# Patient Record
Sex: FEMALE | Race: WHITE | Employment: OTHER | URBAN - METROPOLITAN AREA
[De-identification: names, ages, dates, MRNs, and addresses within clinical notes are randomized per-mention and may not be internally consistent; named-entity substitution may affect disease eponyms.]

---

## 2017-08-22 ENCOUNTER — OFFICE VISIT (OUTPATIENT)
Dept: INTERNAL MEDICINE CLINIC | Age: 82
End: 2017-08-22

## 2017-08-22 VITALS
RESPIRATION RATE: 20 BRPM | HEIGHT: 64 IN | WEIGHT: 148 LBS | HEART RATE: 70 BPM | SYSTOLIC BLOOD PRESSURE: 188 MMHG | OXYGEN SATURATION: 97 % | TEMPERATURE: 97 F | BODY MASS INDEX: 25.27 KG/M2 | DIASTOLIC BLOOD PRESSURE: 82 MMHG

## 2017-08-22 DIAGNOSIS — E03.9 ACQUIRED HYPOTHYROIDISM: ICD-10-CM

## 2017-08-22 DIAGNOSIS — R32 URINARY INCONTINENCE, UNSPECIFIED TYPE: ICD-10-CM

## 2017-08-22 DIAGNOSIS — K21.9 GASTROESOPHAGEAL REFLUX DISEASE WITHOUT ESOPHAGITIS: ICD-10-CM

## 2017-08-22 DIAGNOSIS — R30.0 BURNING WITH URINATION: Primary | ICD-10-CM

## 2017-08-22 DIAGNOSIS — R53.1 WEAKNESS: ICD-10-CM

## 2017-08-22 DIAGNOSIS — I10 ESSENTIAL HYPERTENSION: ICD-10-CM

## 2017-08-22 DIAGNOSIS — Z13.21 ENCOUNTER FOR VITAMIN DEFICIENCY SCREENING: ICD-10-CM

## 2017-08-22 DIAGNOSIS — L85.3 DRY SKIN: ICD-10-CM

## 2017-08-22 DIAGNOSIS — J30.9 ALLERGIC RHINITIS, UNSPECIFIED ALLERGIC RHINITIS TRIGGER, UNSPECIFIED RHINITIS SEASONALITY: ICD-10-CM

## 2017-08-22 DIAGNOSIS — R05.3 CHRONIC COUGH: ICD-10-CM

## 2017-08-22 LAB
BACTERIA UA POCT, BACTPOCT: NORMAL
BILIRUB UR QL STRIP: NEGATIVE
CASTS UA POCT: NORMAL
CLUE CELLS, CLUEPOCT: NORMAL
CRYSTALS UA POCT, CRYSPOCT: NORMAL
EPITHELIAL CELLS POCT, EPITHPOCT: NORMAL
GLUCOSE UR-MCNC: NEGATIVE MG/DL
KETONES P FAST UR STRIP-MCNC: NEGATIVE MG/DL
MUCUS UA POCT, MUCPOCT: NORMAL
PH UR STRIP: 6.5 [PH] (ref 4.6–8)
PROTEIN,URINE POC: NORMAL MG/DL
RBC UA POCT, RBCPOCT: NORMAL
SP GR UR STRIP: 1.02 (ref 1–1.03)
TRICH UA POCT, TRICHPOC: NORMAL
UA UROBILINOGEN AMB POC: NORMAL (ref 0.2–1)
URINALYSIS CLARITY POC: CLEAR
URINALYSIS COLOR POC: YELLOW
URINE BLOOD POC: NEGATIVE
URINE LEUKOCYTES POC: NORMAL
URINE NITRITES POC: NEGATIVE
WBC UA POCT, WBCPOCT: NORMAL
YEAST UA POCT, YEASTPOC: NORMAL

## 2017-08-22 RX ORDER — LEVOTHYROXINE SODIUM 88 UG/1
88 TABLET ORAL
Status: ON HOLD | COMMUNITY
End: 2021-01-01

## 2017-08-22 RX ORDER — ESOMEPRAZOLE MAGNESIUM 40 MG/1
CAPSULE, DELAYED RELEASE ORAL DAILY
COMMUNITY
End: 2021-01-01

## 2017-08-22 RX ORDER — SIMVASTATIN 40 MG/1
TABLET, FILM COATED ORAL
COMMUNITY
End: 2021-01-01

## 2017-08-22 RX ORDER — VALSARTAN 80 MG/1
TABLET ORAL DAILY
COMMUNITY
End: 2017-09-15 | Stop reason: SDUPTHER

## 2017-08-22 RX ORDER — ESTRADIOL 0.1 MG/G
2 CREAM VAGINAL DAILY
Qty: 42.5 G | Refills: 1 | Status: SHIPPED | OUTPATIENT
Start: 2017-08-22 | End: 2021-01-01

## 2017-08-22 RX ORDER — MONTELUKAST SODIUM 10 MG/1
10 TABLET ORAL DAILY
Qty: 30 TAB | Refills: 0 | Status: SHIPPED | OUTPATIENT
Start: 2017-08-22 | End: 2021-01-01

## 2017-08-22 RX ORDER — LORATADINE 10 MG/1
10 TABLET ORAL
Status: ON HOLD | COMMUNITY
End: 2021-01-01

## 2017-08-22 RX ORDER — ASPIRIN 81 MG/1
81 TABLET ORAL DAILY
COMMUNITY

## 2017-08-22 NOTE — LETTER
8/25/2017 8:58 AM 
 
Ms. Medellin Mail 2 [facility] [address] Dear Lacie Mail: 
 
Please find your most recent results below. Resulted Orders AMB POC URINALYSIS DIP STICK AUTO W/ MICRO Result Value Ref Range Color (UA POC) Yellow Clarity (UA POC) Clear Glucose (UA POC) Negative Negative Bilirubin (UA POC) Negative Negative Ketones (UA POC) Negative Negative Specific gravity (UA POC) 1.025 1.001 - 1.035 Blood (UA POC) Negative Negative pH (UA POC) 6.5 4.6 - 8.0 Protein (UA POC) neg Negative mg/dL Urobilinogen (UA POC) 0.2 mg/dL 0.2 - 1 Nitrites (UA POC) Negative Negative Leukocyte esterase (UA POC) 1+ Negative Epithelial cells (UA POC) Mucus (UA POC) WBCs (UA POC) RBCs (UA POC) Casts (UA POC)  Negative Crystals (UA POC)  Negative Clue Cells (UA POC) Trichomonas (UA POC) Yeast (UA POC) Bacteria (UA POC)  Negative CBC WITH AUTOMATED DIFF Result Value Ref Range WBC 7.9 3.4 - 10.8 x10E3/uL  
 RBC 3.90 3.77 - 5.28 x10E6/uL HGB 12.9 11.1 - 15.9 g/dL HCT 37.5 34.0 - 46.6 % MCV 96 79 - 97 fL  
 MCH 33.1 (H) 26.6 - 33.0 pg  
 MCHC 34.4 31.5 - 35.7 g/dL  
 RDW 13.8 12.3 - 15.4 % PLATELET 986 539 - 426 x10E3/uL NEUTROPHILS 43 % Lymphocytes 33 % MONOCYTES 11 % EOSINOPHILS 13 % BASOPHILS 0 %  
 ABS. NEUTROPHILS 3.4 1.4 - 7.0 x10E3/uL Abs Lymphocytes 2.6 0.7 - 3.1 x10E3/uL  
 ABS. MONOCYTES 0.9 0.1 - 0.9 x10E3/uL  
 ABS. EOSINOPHILS 1.0 (H) 0.0 - 0.4 x10E3/uL  
 ABS. BASOPHILS 0.0 0.0 - 0.2 x10E3/uL IMMATURE GRANULOCYTES 0 %  
 ABS. IMM. GRANS. 0.0 0.0 - 0.1 x10E3/uL Narrative Performed at:  [facility] [address] [place] [address]  [id number] : Batsheva Law MD, Phone:  [phone] METABOLIC PANEL, COMPREHENSIVE Result Value Ref Range Glucose 107 (H) 65 - 99 mg/dL BUN 12 8 - 27 mg/dL Creatinine 0.77 0.57 - 1.00 mg/dL  GFR est non-AA 70 >59 mL/min/1.73 GFR est AA 80 >59 mL/min/1.73  
 BUN/Creatinine ratio 16 12 - 28 Sodium 135 134 - 144 mmol/L Potassium 4.4 3.5 - 5.2 mmol/L Chloride 96 96 - 106 mmol/L  
 CO2 26 18 - 29 mmol/L Calcium 9.0 8.7 - 10.3 mg/dL Protein, total 7.1 6.0 - 8.5 g/dL Albumin 4.0 3.5 - 4.7 g/dL GLOBULIN, TOTAL 3.1 1.5 - 4.5 g/dL A-G Ratio 1.3 1.2 - 2.2 Bilirubin, total 0.4 0.0 - 1.2 mg/dL Alk. phosphatase 123 (H) 39 - 117 IU/L  
 AST (SGOT) 27 0 - 40 IU/L  
 ALT (SGPT) 18 0 - 32 IU/L Narrative Performed at:  [address] [address]  [id number] : Miriam Velasco MD, Phone:  [phone] LIPID PANEL Result Value Ref Range Cholesterol, total 174 100 - 199 mg/dL Triglyceride 173 (H) 0 - 149 mg/dL HDL Cholesterol 53 >39 mg/dL VLDL, calculated 35 5 - 40 mg/dL LDL, calculated 86 0 - 99 mg/dL Narrative Performed at:  [address] [address]  [id number] : Miriam Velasco MD, Phone:  [phone] VITAMIN D, 25 HYDROXY Result Value Ref Range VITAMIN D, 25-HYDROXY 20.3 (L) 30.0 - 100.0 ng/mL Comment:  
   Vitamin D deficiency has been defined by the [facility] practice guideline as a 
level of serum 25-OH vitamin D less than 20 ng/mL (1,2). The Endocrine Society went on to further define vitamin D 
insufficiency as a level between 21 and 29 ng/mL (2). 1. IOM ([place] of [employer]). 2010. Dietary reference 
   intakes for calcium and D. [facility]: The 
   mycirQle. 2. Julio César MF, Carole NC, Taryn TALAMANTES, et al. 
   Evaluation, treatment, and prevention of vitamin D 
   deficiency: an Endocrine Society clinical practice 
   guideline. JCEM. 2011 Jul; 96(7):1911-30. Narrative Performed at:  [address] [address]  [id number] : Miriam Velasco MD, Phone:  [phone] TSH 3RD GENERATION Result Value Ref Range TSH 1.360 0.450 - 4.500 uIU/mL Narrative Performed at:  [address]  [id number] : Miriam Velasco MD, Phone:  [phone] CVD REPORT Result Value Ref Range INTERPRETATION Note Comment:  
   Supplement report is available. Narrative Performed at:  [address] [place] [address]  [id number] : Kimball Brittle PhD, Phone:  [phone] RECOMMENDATIONS: 
As my nurse discussed over the phone: 
Triglycerides are elevated.  Encourage patient to cut sugar, increase fiber intake, limit alcohol, exercise as tolerated, and cut back on starches. Vitamin D is low.  Vitamin D 50,000 units weekly x 12 weeks.  After completion of 12 weeks, recommend OTC Vitamin D3 1000 units daily.  Med sent to pharmacy. Other labs stable Please call me if you have any questions: [phone] Sincerely, Anum Gandara NP

## 2017-08-22 NOTE — PROGRESS NOTES
Subjective:     Chief Complaint   Patient presents with    [facility]    Form Completion    Urinary Frequency       History of Present Illness    Beltran Perez is a 80y.o. year old female who presents as a new patient to [facility]. She has a hx of HTN, hypothyroid, GERD, allergic rhinitis, urinary incontinence, and cough. Today she arrives with her daughter for complaints of chronic cough. Patient believes the cough is related to PND. The patients daughter is requesting a rx for singular. The patient is already taking Claritin. She denies and CP or SOB. NAD. She is also complaining of urinary incontinence for several years. She has to wear a brief. \"Its more like a constant dribble. \"  Patient is negative for UTI in office today. She denies any other  symptoms. She is also requesting a referral to PT for increased weakness. She has a hx of bilateral knee replacement and is ambulatory with a cane. She states it is harder for her to get into a standing position from sitting recently. She also reports chronic dry skin. She denies any other complaints at this time. She needs labs. Medication review with patient. She reports compliance. No current outpatient prescriptions on file prior to visit. No current facility-administered medications on file prior to visit. Allergies no known allergies   History reviewed. No pertinent past medical history.    Past Surgical History:   Procedure Laterality Date    HX HYSTERECTOMY        Social History   Substance Use Topics    Smoking status: Never Smoker    Smokeless tobacco: Never Used    Alcohol use No      Family History   Problem Relation Age of Onset    Hypertension Mother     Cancer Father         Objective:     Vitals:    08/22/17 1411   BP: 188/82   Pulse: 70   Resp: 20   Temp: 97 °F (36.1 °C)   TempSrc: Oral   SpO2: 97%   Weight: 148 lb (67.1 kg)   Height: 5' 4\" (1.626 m)       Review of Systems   Constitutional: Negative. HENT: Negative. Eyes: Negative. Respiratory: Positive for cough. Cardiovascular: Negative. Gastrointestinal: Negative. Genitourinary:        Incontinence   Musculoskeletal: Negative. Skin: Positive for itching. Neurological:        Weakness   Psychiatric/Behavioral: Negative. Physical Exam   Constitutional: She is oriented to person, place, and time. She appears well-developed and well-nourished. No distress. Pleasant elderly  women. HENT:   Head: Normocephalic and atraumatic. Eyes: EOM are normal. Pupils are equal, round, and reactive to light. Neck: Normal range of motion. Neck supple. Cardiovascular: Normal rate, regular rhythm and normal heart sounds. Pulmonary/Chest: Effort normal and breath sounds normal. No respiratory distress. Abdominal: Soft. Bowel sounds are normal. She exhibits no distension. There is no tenderness. Musculoskeletal: Normal range of motion. She exhibits no edema, tenderness or deformity. Pt is ambulatory with a cane. Neurological: She is alert and oriented to person, place, and time. Skin: Skin is warm and dry. She is not diaphoretic. Psychiatric: She has a normal mood and affect. Her behavior is normal.   Nursing note and vitals reviewed. Assessment/ Plan:   Diagnoses and all orders for this visit:    1. Burning with urination   Will order  -     AMB POC URINALYSIS DIP STICK AUTO W/O MICRO  -     AMB POC URINALYSIS DIP STICK AUTO W/ MICRO     2. Essential hypertension   Will order  -     CBC WITH AUTOMATED DIFF  -     METABOLIC PANEL, COMPREHENSIVE  -     LIPID PANEL    3. Gastroesophageal reflux disease without esophagitis   Will order  -     CBC WITH AUTOMATED DIFF  -     METABOLIC PANEL, COMPREHENSIVE    4.  Urinary incontinence, unspecified type   Will order  -     CBC WITH AUTOMATED DIFF  -     METABOLIC PANEL, COMPREHENSIVE  -     estradiol (ESTRACE) 0.01 % (0.1 mg/gram) vaginal cream; Insert 2 g into vagina daily. Educated on doing Kegel exercises. Do the exercises 3 times a day, on 3 or 4 days a week. Each time, flex your muscles 8 to 12 times, and hold them tight for 6 to 8 seconds each time you tighten. Keep up this routine for at least 3 to 4 months. 5. Acquired hypothyroidism   Will order  -     TSH 3RD GENERATION    6. Encounter for vitamin deficiency screening   Will order  -     VITAMIN D, 25 HYDROXY    7. Weakness   Will order  -     REFERRAL TO PHYSICAL THERAPY    8. Allergic rhinitis, unspecified allergic rhinitis trigger, unspecified rhinitis seasonality    Will order   -     montelukast (SINGULAIR) 10 mg tablet; Take 1 Tab by mouth daily. 9. Chronic cough   Will order  -     montelukast (SINGULAIR) 10 mg tablet; Take 1 Tab by mouth daily. 10. Dry skin   Will order   -     cetaphil (CETAPHIL) lotion; Apply  to affected area as needed for Dry Skin. Patient's plan of care has been reviewed with them. Patient and/or family have verbally conveyed their understanding and agreement of the patient's signs, symptoms, diagnosis, treatment and prognosis and additionally agree to follow up as recommended or return to [facility] should their condition change prior to follow-up. Discharge instructions have also been provided to the patient with some educational information regarding their diagnosis as well a list of reasons why they would want to return to the office prior to their follow-up appointment should their condition change.

## 2017-08-22 NOTE — PROGRESS NOTES
Chief Complaint   Patient presents with   [facility] Establish Care    Form Completion    Urinary Frequency

## 2017-08-22 NOTE — MR AVS SNAPSHOT
Visit Information Date & Time Provider Department Dept. Phone Encounter #  
 8/22/2017  2:00 PM Negrita Munoz NP [facility] [phone] [id number] Upcoming Health Maintenance Date Due DTaP/Tdap/Td series (1 - Tdap) 8/3/1951 ZOSTER VACCINE AGE 60> 6/3/1990 GLAUCOMA SCREENING Q2Y 8/3/1995 OSTEOPOROSIS SCREENING (DEXA) 8/3/1995 Pneumococcal 65+ Low/Medium Risk (1 of 2 - PCV13) 8/3/1995 MEDICARE YEARLY EXAM 8/3/1995 INFLUENZA AGE 9 TO ADULT 8/1/2017 Allergies as of 8/22/2017  Never Reviewed Not on File Current Immunizations  Never Reviewed No immunizations on file. Not reviewed this visit You Were Diagnosed With   
  
 Codes Comments Burning with urination    -  Primary ICD-10-CM: R30.0 ICD-9-CM: 788.1 Essential hypertension     ICD-10-CM: I10 
ICD-9-CM: 401.9 Gastroesophageal reflux disease without esophagitis     ICD-10-CM: K21.9 ICD-9-CM: 530.81 Urinary incontinence, unspecified type     ICD-10-CM: R32 
ICD-9-CM: 788.30 Acquired hypothyroidism     ICD-10-CM: E03.9 ICD-9-CM: 244.9 Encounter for vitamin deficiency screening     ICD-10-CM: Z13.21 ICD-9-CM: V77.99 Weakness     ICD-10-CM: R53.1 ICD-9-CM: 780.79 Allergic rhinitis, unspecified allergic rhinitis trigger, unspecified rhinitis seasonality     ICD-10-CM: J30.9 ICD-9-CM: 477.9 Chronic cough     ICD-10-CM: R05 ICD-9-CM: 786.2 Dry skin     ICD-10-CM: L85.3 ICD-9-CM: 701.1 Vitals BP Pulse Temp Resp Height(growth percentile) Weight(growth percentile) 188/82 (BP 1 Location: Right arm, BP Patient Position: Sitting) 70 97 °F (36.1 °C) (Oral) 20 5' 4\" (1.626 m) 148 lb (67.1 kg) SpO2 BMI 97% 25.4 kg/m2 Vitals History BMI and BSA Data Body Mass Index Body Surface Area  
 25.4 kg/m 2 1.74 m 2 Preferred Pharmacy Pharmacy Name Phone  [facility], [address] [place] [address] [phone] Your Updated Medication List  
  
   
This list is accurate as of: 8/22/17  3:07 PM.  Always use your most recent med list.  
  
  
  
  
 aspirin delayed-release 81 mg tablet Take  by mouth daily. cetaphil lotion Commonly known as:  CETAPHIL Apply  to affected area as needed for Dry Skin. conjugated estrogens 0.625 mg/gram vaginal cream  
Commonly known as:  PREMARIN Insert 0.5 g into vagina daily. levothyroxine 88 mcg tablet Commonly known as:  SYNTHROID Take  by mouth Daily (before breakfast). loratadine 10 mg tablet Commonly known as:  Emmy Buddle Take 10 mg by mouth.  
  
 montelukast 10 mg tablet Commonly known as:  SINGULAIR Take 1 Tab by mouth daily. NexIUM 40 mg capsule Generic drug:  esomeprazole Take  by mouth daily. simvastatin 40 mg tablet Commonly known as:  ZOCOR Take  by mouth nightly. valsartan 80 mg tablet Commonly known as:  DIOVAN Take  by mouth daily. Prescriptions Sent to Pharmacy Refills  
 montelukast (SINGULAIR) 10 mg tablet 0 Sig: Take 1 Tab by mouth daily. Class: Normal  
 Pharmacy: [address] [place], [place] [address], [place] [address] Ph #: [phone] Route: Oral  
 cetaphil (CETAPHIL) lotion 0 Sig: Apply  to affected area as needed for Dry Skin. Class: Normal  
 Pharmacy: [address] [place], [place] [address], [place] [address] Ph #: [phone] Route: Topical  
 conjugated estrogens (PREMARIN) 0.625 mg/gram vaginal cream 1 Sig: Insert 0.5 g into vagina daily. Class: Normal  
 Pharmacy: [address] [place], [place] [address], [place] [address] Ph #: [phone] Route: Vaginal  
  
We Performed the Following AMB POC URINALYSIS DIP STICK AUTO W/ MICRO  [36206 CPT(R)] AMB POC URINALYSIS DIP STICK AUTO W/O MICRO [46013 CPT(R)] CBC WITH AUTOMATED DIFF [81932 CPT(R)] LIPID PANEL [90246 CPT(R)] METABOLIC PANEL, COMPREHENSIVE [97517 CPT(R)]  REFERRAL TO PHYSICAL THERAPY [BJS13 Custom] Comments:  
 Strength training TSH 3RD GENERATION [44790 CPT(R)] VITAMIN D, 25 HYDROXY O501016 CPT(R)] Referral Information Referral ID Referred By Referred To  
  
 [id number] KONSTANTINNTRony COLBERTelieseralonso 57 [address] [facility] [place], [address] [place] [address] Road Phone: [phone] Fax: [phone] Visits Status Start Date End Date 1 New Request 8/22/17 8/22/18 If your referral has a status of pending review or denied, additional information will be sent to support the outcome of this decision. Introducing [facility]! Elizabeth Shepherd introduces Zookal patient portal. Now you can access parts of your medical record, email your doctor's office, and request medication refills online. 1. In your internet browser, go to [email or web address] 2. Click on the First Time User? Click Here link in the Sign In box. You will see the New Member Sign Up page. 3. Enter your Zookal Access Code exactly as it appears below. You will not need to use this code after youve completed the sign-up process. If you do not sign up before the expiration date, you must request a new code. · Zookal Access Code: [email or web address] Expires: 11/20/2017  1:49 PM 
 
4. Enter the last four digits of your Social Security Number (xxxx) and Date of Birth (mm/dd/yyyy) as indicated and click Submit. You will be taken to the next sign-up page. 5. Create a [employer] ID. This will be your Zookal login ID and cannot be changed, so think of one that is secure and easy to remember. 6. Create a Zookal password. You can change your password at any time. 7. Enter your Password Reset Question and Answer. This can be used at a later time if you forget your password. 8. Enter your e-mail address. You will receive e-mail notification when new information is available in [address]  
9. Click Sign Up. You can now view and download portions of your medical record. 10. Click the Download Summary menu link to download a portable copy of your medical information. If you have questions, please visit the Frequently Asked Questions section of the Webcrumb[employer] website. Remember, Webcrumbz is NOT to be used for urgent needs. For medical emergencies, dial 911. Now available from your iPhone and Android! Please provide this summary of care documentation to your next provider. Your primary care clinician is listed as Genesis Sanchez. If you have any questions after today's visit, please call [phone].

## 2017-08-23 ENCOUNTER — HOSPITAL ENCOUNTER (OUTPATIENT)
Dept: LAB | Age: 82
Discharge: HOME | End: 2017-08-23
Payer: MEDICARE

## 2017-08-23 PROCEDURE — 80061 LIPID PANEL: CPT

## 2017-08-23 PROCEDURE — 82306 VITAMIN D 25 HYDROXY: CPT

## 2017-08-23 PROCEDURE — 84443 ASSAY THYROID STIM HORMONE: CPT

## 2017-08-23 PROCEDURE — 85025 COMPLETE CBC W/AUTO DIFF WBC: CPT

## 2017-08-23 PROCEDURE — 36415 COLL VENOUS BLD VENIPUNCTURE: CPT

## 2017-08-23 PROCEDURE — 80053 COMPREHEN METABOLIC PANEL: CPT

## 2017-08-24 LAB
25(OH)D3+25(OH)D2 SERPL-MCNC: 20.3 NG/ML (ref 30–100)
ALBUMIN SERPL-MCNC: 4 G/DL (ref 3.5–4.7)
ALBUMIN/GLOB SERPL: 1.3 {RATIO} (ref 1.2–2.2)
ALP SERPL-CCNC: 123 IU/L (ref 39–117)
ALT SERPL-CCNC: 18 IU/L (ref 0–32)
AST SERPL-CCNC: 27 IU/L (ref 0–40)
BASOPHILS # BLD AUTO: 0 X10E3/UL (ref 0–0.2)
BASOPHILS NFR BLD AUTO: 0 %
BILIRUB SERPL-MCNC: 0.4 MG/DL (ref 0–1.2)
BUN SERPL-MCNC: 12 MG/DL (ref 8–27)
BUN/CREAT SERPL: 16 (ref 12–28)
CALCIUM SERPL-MCNC: 9 MG/DL (ref 8.7–10.3)
CHLORIDE SERPL-SCNC: 96 MMOL/L (ref 96–106)
CHOLEST SERPL-MCNC: 174 MG/DL (ref 100–199)
CO2 SERPL-SCNC: 26 MMOL/L (ref 18–29)
CREAT SERPL-MCNC: 0.77 MG/DL (ref 0.57–1)
EOSINOPHIL # BLD AUTO: 1 X10E3/UL (ref 0–0.4)
EOSINOPHIL NFR BLD AUTO: 13 %
ERYTHROCYTE [DISTWIDTH] IN BLOOD BY AUTOMATED COUNT: 13.8 % (ref 12.3–15.4)
GLOBULIN SER CALC-MCNC: 3.1 G/DL (ref 1.5–4.5)
GLUCOSE SERPL-MCNC: 107 MG/DL (ref 65–99)
HCT VFR BLD AUTO: 37.5 % (ref 34–46.6)
HDLC SERPL-MCNC: 53 MG/DL
HGB BLD-MCNC: 12.9 G/DL (ref 11.1–15.9)
IMM GRANULOCYTES # BLD: 0 X10E3/UL (ref 0–0.1)
IMM GRANULOCYTES NFR BLD: 0 %
INTERPRETATION, 910389: NORMAL
LDLC SERPL CALC-MCNC: 86 MG/DL (ref 0–99)
LYMPHOCYTES # BLD AUTO: 2.6 X10E3/UL (ref 0.7–3.1)
LYMPHOCYTES NFR BLD AUTO: 33 %
MCH RBC QN AUTO: 33.1 PG (ref 26.6–33)
MCHC RBC AUTO-ENTMCNC: 34.4 G/DL (ref 31.5–35.7)
MCV RBC AUTO: 96 FL (ref 79–97)
MONOCYTES # BLD AUTO: 0.9 X10E3/UL (ref 0.1–0.9)
MONOCYTES NFR BLD AUTO: 11 %
NEUTROPHILS # BLD AUTO: 3.4 X10E3/UL (ref 1.4–7)
NEUTROPHILS NFR BLD AUTO: 43 %
PLATELET # BLD AUTO: 244 X10E3/UL (ref 150–379)
POTASSIUM SERPL-SCNC: 4.4 MMOL/L (ref 3.5–5.2)
PROT SERPL-MCNC: 7.1 G/DL (ref 6–8.5)
RBC # BLD AUTO: 3.9 X10E6/UL (ref 3.77–5.28)
SODIUM SERPL-SCNC: 135 MMOL/L (ref 134–144)
TRIGL SERPL-MCNC: 173 MG/DL (ref 0–149)
TSH SERPL DL<=0.005 MIU/L-ACNC: 1.36 UIU/ML (ref 0.45–4.5)
VLDLC SERPL CALC-MCNC: 35 MG/DL (ref 5–40)
WBC # BLD AUTO: 7.9 X10E3/UL (ref 3.4–10.8)

## 2017-08-24 RX ORDER — ERGOCALCIFEROL 1.25 MG/1
50000 CAPSULE ORAL
Qty: 12 CAP | Refills: 0 | Status: SHIPPED | OUTPATIENT
Start: 2017-08-24 | End: 2021-01-01

## 2017-08-24 NOTE — PROGRESS NOTES
Triglycerides are elevated. Encourage patient to cut sugar, increase fiber intake, limit alcohol, exercise as tolerated, and cut back on starches. Vitamin D is low. Vitamin D 50,000 units weekly x 12 weeks. After completion of 12 weeks, recommend OTC Vitamin D3 1000 units daily. Med sent to pharmacy. Other labs stable.

## 2017-09-15 ENCOUNTER — OFFICE VISIT (OUTPATIENT)
Dept: INTERNAL MEDICINE CLINIC | Age: 82
End: 2017-09-15

## 2017-09-15 ENCOUNTER — HOSPITAL ENCOUNTER (OUTPATIENT)
Dept: LAB | Age: 82
Discharge: HOME | End: 2017-09-15
Payer: MEDICARE

## 2017-09-15 VITALS
SYSTOLIC BLOOD PRESSURE: 173 MMHG | TEMPERATURE: 98 F | BODY MASS INDEX: 25.61 KG/M2 | HEIGHT: 64 IN | WEIGHT: 150 LBS | OXYGEN SATURATION: 97 % | HEART RATE: 78 BPM | DIASTOLIC BLOOD PRESSURE: 90 MMHG | RESPIRATION RATE: 20 BRPM

## 2017-09-15 DIAGNOSIS — R05.3 CHRONIC COUGH: Primary | ICD-10-CM

## 2017-09-15 DIAGNOSIS — K21.9 GASTROESOPHAGEAL REFLUX DISEASE WITHOUT ESOPHAGITIS: ICD-10-CM

## 2017-09-15 DIAGNOSIS — I10 ESSENTIAL HYPERTENSION: ICD-10-CM

## 2017-09-15 PROCEDURE — 82785 ASSAY OF IGE: CPT

## 2017-09-15 PROCEDURE — 36415 COLL VENOUS BLD VENIPUNCTURE: CPT

## 2017-09-15 RX ORDER — PROMETHAZINE HYDROCHLORIDE AND DEXTROMETHORPHAN HYDROBROMIDE 6.25; 15 MG/5ML; MG/5ML
5 SYRUP ORAL
Qty: 118 ML | Refills: 0 | Status: SHIPPED | OUTPATIENT
Start: 2017-09-15 | End: 2017-09-22

## 2017-09-15 RX ORDER — VALSARTAN 160 MG/1
160 TABLET ORAL DAILY
Qty: 30 TAB | Refills: 1 | Status: ON HOLD | OUTPATIENT
Start: 2017-09-15 | End: 2021-01-01

## 2017-09-15 NOTE — PATIENT INSTRUCTIONS
Cough: Care Instructions  Your Care Instructions  A cough is your body's response to something that bothers your throat or airways. Many things can cause a cough. You might cough because of a cold or the flu, bronchitis, or asthma. Smoking, postnasal drip, allergies, and stomach acid that backs up into your throat also can cause coughs. A cough is a symptom, not a disease. Most coughs stop when the cause, such as a cold, goes away. You can take a few steps at home to cough less and feel better. Follow-up care is a key part of your treatment and safety. Be sure to make and go to all appointments, and call your doctor if you are having problems. It's also a good idea to know your test results and keep a list of the medicines you take. How can you care for yourself at home? · Drink lots of water and other fluids. This helps thin the mucus and soothes a dry or sore throat. Honey or lemon juice in hot water or tea may ease a dry cough. · Take cough medicine as directed by your doctor. · Prop up your head on pillows to help you breathe and ease a dry cough. · Try cough drops to soothe a dry or sore throat. Cough drops don't stop a cough. Medicine-flavored cough drops are no better than candy-flavored drops or hard candy. · Do not smoke. Avoid secondhand smoke. If you need help quitting, talk to your doctor about stop-smoking programs and medicines. These can increase your chances of quitting for good. When should you call for help? Call 911 anytime you think you may need emergency care. For example, call if:  · You have severe trouble breathing. Call your doctor now or seek immediate medical care if:  · You cough up blood. · You have new or worse trouble breathing. · You have a new or higher fever. · You have a new rash.   Watch closely for changes in your health, and be sure to contact your doctor if:  · You cough more deeply or more often, especially if you notice more mucus or a change in the color of your mucus. · You have new symptoms, such as a sore throat, an earache, or sinus pain. · You do not get better as expected. Where can you learn more? Go to http://yvonne-jocelynn.info/. Enter D279 in the search box to learn more about \"Cough: Care Instructions. \"  Current as of: March 25, 2017  Content Version: 11.3  © 6789-9269 [employer]. Care instructions adapted under license by [employer] (which disclaims liability or warranty for this information). If you have questions about a medical condition or this instruction, always ask your healthcare professional. [place] [address] any warranty or liability for your use of this information. Chronic Cough: Care Instructions  Your Care Instructions    A cough is your body's response to something that bothers your throat or airways. Many things can cause a cough. You might cough because of a cold or the flu, bronchitis, or asthma. Smoking, postnasal drip, allergies, and stomach acid that backs up into your throat also can cause a cough. A cough can be short-term (acute) or long-term (chronic). A chronic cough lasts more than 3 weeks. A chronic cough is often caused by a long-term problem, such as asthma. Another cause might be a medicine, such as an ACE inhibitor. A cough is a symptom, not a disease. To treat a chronic cough, you may need to treat the problem that causes it. You can take a few steps at home to cough less and feel better. Some people cough or clear their throat out of habit for no clear reason. Follow-up care is a key part of your treatment and safety. Be sure to make and go to all appointments, and call your doctor if you are having problems. It's also a good idea to know your test results and keep a list of the medicines you take. How can you care for yourself at home? · Drink plenty of water and other fluids. This may help soothe a dry or sore throat.  Honey or lemon juice in hot water or tea may ease a dry cough. · Prop up your head on pillows to help you breathe and ease a cough. · Do not smoke or allow others to smoke around you. Smoke can make a cough worse. If you need help quitting, talk to your doctor about stop-smoking programs and medicines. These can increase your chances of quitting for good. · Avoid exposure to smoke, dust, or other pollutants, or wear a face mask. Check with your doctor or pharmacist to find out which type of face mask will give you the most benefit. · Take cough medicine as directed by your doctor. · Try cough drops to soothe a dry or sore throat. Cough drops don't stop a cough. Medicine-flavored cough drops are no better than candy-flavored drops or hard candy. Throat clearing  When you have a chronic cough or a disease that may cause this type of cough, you may often feel like you want to clear your throat. This helps bring up mucus. But throat clearing does not always have a cause. Throat clearing can become a habit. The more you do it, the more you feel like you need to do it. But frequent throat clearing can be hard on your vocal cords. It's like slamming them together. To help lessen throat clearing, you can try:  · Taking small sips of water. · Not clearing your throat when you feel you need to. · Swallowing hard when you want to clear your throat. You may want to ask your doctor if a medicine that thins mucus would help. When should you call for help? Call 911 anytime you think you may need emergency care. For example, call if:  · You have severe trouble breathing. Call your doctor now or seek immediate medical care if:  · You cough up blood. · You have new or worse trouble breathing. · You have a new or higher fever. Watch closely for changes in your health, and be sure to contact your doctor if:  · You cough more deeply or more often, especially if you notice more mucus or a change in the color of your mucus.   · You do not get better as expected. Where can you learn more? Go to http://yvonne-jocelynn.info/. Enter A257 in the search box to learn more about \"Chronic Cough: Care Instructions. \"  Current as of: March 25, 2017  Content Version: 11.3  © 0291-6038 [employer]. Care instructions adapted under license by [employer] (which disclaims liability or warranty for this information). If you have questions about a medical condition or this instruction, always ask your healthcare professional. [address] any warranty or liability for your use of this information. Gastroesophageal Reflux Disease (GERD): Care Instructions  Your Care Instructions    Gastroesophageal reflux disease (GERD) is the backward flow of stomach acid into the esophagus. The esophagus is the tube that leads from your throat to your stomach. A one-way valve prevents the stomach acid from moving up into this tube. When you have GERD, this valve does not close tightly enough. If you have mild GERD symptoms including heartburn, you may be able to control the problem with antacids or over-the-counter medicine. Changing your diet, losing weight, and making other lifestyle changes can also help reduce symptoms. Follow-up care is a key part of your treatment and safety. Be sure to make and go to all appointments, and call your doctor if you are having problems. Its also a good idea to know your test results and keep a list of the medicines you take. How can you care for yourself at home? · Take your medicines exactly as prescribed. Call your doctor if you think you are having a problem with your medicine. · Your doctor may recommend over-the-counter medicine. For mild or occasional indigestion, antacids, such as Tums, Gaviscon, Mylanta, or Maalox, may help. Your doctor also may recommend over-the-counter acid reducers, such as Pepcid AC, Tagamet HB, Zantac 75, or Prilosec.  Read and follow all instructions on the label. If you use these medicines often, talk with your doctor. · Change your eating habits. ¨ Its best to eat several small meals instead of two or three large meals. ¨ After you eat, wait 2 to 3 hours before you lie down. ¨ Chocolate, mint, and alcohol can make GERD worse. ¨ Spicy foods, foods that have a lot of acid (like tomatoes and oranges), and coffee can make GERD symptoms worse in some people. If your symptoms are worse after you eat a certain food, you may want to stop eating that food to see if your symptoms get better. · Do not smoke or chew tobacco. Smoking can make GERD worse. If you need help quitting, talk to your doctor about stop-smoking programs and medicines. These can increase your chances of quitting for good. · If you have GERD symptoms at night, raise the head of your bed 6 to 8 inches by putting the frame on blocks or placing a foam wedge under the head of your mattress. (Adding extra pillows does not work.)  · Do not wear tight clothing around your middle. · Lose weight if you need to. Losing just 5 to 10 pounds can help. When should you call for help? Call your doctor now or seek immediate medical care if:  · You have new or different belly pain. · Your stools are black and tarlike or have streaks of blood. Watch closely for changes in your health, and be sure to contact your doctor if:  · Your symptoms have not improved after 2 days. · Food seems to catch in your throat or chest.  Where can you learn more? Go to http://yvonne-jocelynn.info/. Enter Q739 in the search box to learn more about \"Gastroesophageal Reflux Disease (GERD): Care Instructions. \"  Current as of: August 9, 2016  Content Version: 11.3  © 4040-0992 [employer]. Care instructions adapted under license by [employer] (which disclaims liability or warranty for this information).  If you have questions about a medical condition or this instruction, always ask your healthcare professional. [address] any warranty or liability for your use of this information.

## 2017-09-15 NOTE — MR AVS SNAPSHOT
Visit Information Date & Time Provider Department Dept. Phone Encounter #  
 9/15/2017  2:00 PM Sonny eWbb NP [facility] [phone] [id number] Follow-up Instructions Return in about 3 months (around 12/15/2017). Your Appointments 9/25/2017 10:00 AM  
ROUTINE CARE with Sonny Webb NP [facility] ([facility] Appt Note: follow up [address] [place] [address] [address] [facility] [address]  
[phone]  
  
   
 [address] Upcoming Health Maintenance Date Due DTaP/Tdap/Td series (1 - Tdap) 8/3/1951 ZOSTER VACCINE AGE 60> 6/3/1990 GLAUCOMA SCREENING Q2Y 8/3/1995 OSTEOPOROSIS SCREENING (DEXA) 8/3/1995 Pneumococcal 65+ Low/Medium Risk (1 of 2 - PCV13) 8/3/1995 MEDICARE YEARLY EXAM 8/3/1995 INFLUENZA AGE 9 TO ADULT 8/1/2017 Allergies as of 9/15/2017  Review Complete On: 9/15/2017 By: Charlotte Franks LPN No Known Allergies Current Immunizations  Never Reviewed No immunizations on file. Not reviewed this visit You Were Diagnosed With   
  
 Codes Comments Chronic cough    -  Primary ICD-10-CM: T09 ICD-9-CM: 786.2 Essential hypertension     ICD-10-CM: I10 
ICD-9-CM: 401.9 Vitals BP Pulse Temp Resp Height(growth percentile) Weight(growth percentile) (!) 185/93 (BP 1 Location: Left arm, BP Patient Position: Sitting) 78 98 °F (36.7 °C) (Oral) 20 5' 4\" (1.626 m) 150 lb (68 kg) SpO2 BMI OB Status Smoking Status 97% 25.75 kg/m2 Menopause Never Smoker Vitals History BMI and BSA Data Body Mass Index Body Surface Area 25.75 kg/m 2 1.75 m 2 Preferred Pharmacy Pharmacy Name Phone [facility], [address] [place] [address] [phone] Your Updated Medication List  
  
   
This list is accurate as of: 9/15/17  2:26 PM.  Always use your most recent med list.  
  
  
  
  
 aspirin delayed-release 81 mg tablet Take  by mouth daily. cetaphil lotion Commonly known as:  CETAPHIL Apply  to affected area as needed for Dry Skin.  
  
 ergocalciferol 50,000 unit capsule Commonly known as:  ERGOCALCIFEROL Take 1 Cap by mouth every seven (7) days. estradiol 0.01 % (0.1 mg/gram) vaginal cream  
Commonly known as:  ESTRACE Insert 2 g into vagina daily. levothyroxine 88 mcg tablet Commonly known as:  SYNTHROID Take  by mouth Daily (before breakfast). loratadine 10 mg tablet Commonly known as:  Sergio Moulder Take 10 mg by mouth.  
  
 montelukast 10 mg tablet Commonly known as:  SINGULAIR Take 1 Tab by mouth daily. NexIUM 40 mg capsule Generic drug:  esomeprazole Take  by mouth daily. promethazine-dextromethorphan 6.25-15 mg/5 mL syrup Commonly known as:  PROMETHAZINE-DM Take 5 mL by mouth every four (4) hours as needed for Cough for up to 7 days. simvastatin 40 mg tablet Commonly known as:  ZOCOR Take  by mouth nightly. valsartan 160 mg tablet Commonly known as:  DIOVAN Take 1 Tab by mouth daily. Prescriptions Sent to Pharmacy Refills  
 promethazine-dextromethorphan (PROMETHAZINE-DM) 6.25-15 mg/5 mL syrup 0 Sig: Take 5 mL by mouth every four (4) hours as needed for Cough for up to 7 days. Class: Normal  
 Pharmacy: [address] [place] [address] Ph #: [phone] Route: Oral  
 valsartan (DIOVAN) 160 mg tablet 1 Sig: Take 1 Tab by mouth daily. Class: Normal  
 Pharmacy: [address] [place] [address] Ph #: [phone] Route: Oral  
  
We Performed the Following ALLERGEN (24) PANEL [[id number] Custom] REFERRAL TO PULMONARY DISEASE [ZEM83 Custom] Comments:  
 Please evaluate patient for chronic cough. Follow-up Instructions Return in about 3 months (around 12/15/2017). Referral Information  Referral ID Referred By Referred To  
  
 [id number] LIL[facility]E [facility] [address] Visits Status Start Date End Date 1 New Request 9/15/17 9/15/18 If your referral has a status of pending review or denied, additional information will be sent to support the outcome of this decision. Introducing [facility]! Francisca Gregorio introduces Retrevo patient portal. Now you can access parts of your medical record, email your doctor's office, and request medication refills online. 1. In your internet browser, go to [email or web address] 2. Click on the First Time User? Click Here link in the Sign In box. You will see the New Member Sign Up page. 3. Enter your Retrevo Access Code exactly as it appears below. You will not need to use this code after youve completed the sign-up process. If you do not sign up before the expiration date, you must request a new code. · Retrevo Access Code: [email or web address] Expires: 11/20/2017  1:49 PM 
 
4. Enter the last four digits of your Social Security Number (xxxx) and Date of Birth (mm/dd/yyyy) as indicated and click Submit. You will be taken to the next sign-up page. 5. Create a Retrevo ID. This will be your Retrevo login ID and cannot be changed, so think of one that is secure and easy to remember. 6. Create a Retrevo password. You can change your password at any time. 7. Enter your Password Reset Question and Answer. This can be used at a later time if you forget your password. 8. Enter your e-mail address. You will receive e-mail notification when new information is available in [address] 9. Click Sign Up. You can now view and download portions of your medical record. 10. Click the Download Summary menu link to download a portable copy of your medical information. If you have questions, please visit the Frequently Asked Questions section of the Retrevo website.  Remember, [employer] is NOT to be used for urgent needs. For medical emergencies, dial 911. Now available from your iPhone and Android! Please provide this summary of care documentation to your next provider. Your primary care clinician is listed as Genesis Sanchez. If you have any questions after today's visit, please call [phone].

## 2017-09-15 NOTE — PROGRESS NOTES
Subjective:     Chief Complaint   Patient presents with    Cough     dry non productive       History of Present Illness    Luis Tavares is a 80y.o. year old female who presents for a follow-up regarding her chronic cough. At her last office visit I ordered Singular. She states she has taking the medication with out relief. She also takes Nexium. She denies any other associated symptoms. She states the cough is bad a night and keeps her awake. She states her daughter, whom she lives with, had a dog and is curious if she may have an allergy. Her BP is elevated today. She reports compliance with her Diovan. She reports her former PCP had her on Diovan 160 mg and then decreased her to 80 mg. The patient states it was due to her BP improving. Will increase Diovan to 160 mg. She denies any other associated symptoms. No CP, SOB, GI, or  symptoms. Reviewed medications, recent lab work and imaging with patient. Pt reports compliance with medications. Current Outpatient Prescriptions on File Prior to Visit   Medication Sig Dispense Refill    ergocalciferol (ERGOCALCIFEROL) 50,000 unit capsule Take 1 Cap by mouth every seven (7) days. 12 Cap 0    loratadine (CLARITIN) 10 mg tablet Take 10 mg by mouth.  esomeprazole (NEXIUM) 40 mg capsule Take  by mouth daily.  simvastatin (ZOCOR) 40 mg tablet Take  by mouth nightly.  aspirin delayed-release 81 mg tablet Take  by mouth daily.  levothyroxine (SYNTHROID) 88 mcg tablet Take  by mouth Daily (before breakfast).  montelukast (SINGULAIR) 10 mg tablet Take 1 Tab by mouth daily. 30 Tab 0    cetaphil (CETAPHIL) lotion Apply  to affected area as needed for Dry Skin. 400 mL 0    estradiol (ESTRACE) 0.01 % (0.1 mg/gram) vaginal cream Insert 2 g into vagina daily. 42.5 g 1     No current facility-administered medications on file prior to visit.         No Known Allergies   Past Medical History:   Diagnosis Date    Hypercholesterolemia     Hypertension     Thyroid disease       Past Surgical History:   Procedure Laterality Date    HX HYSTERECTOMY        Social History   Substance Use Topics    Smoking status: Never Smoker    Smokeless tobacco: Never Used    Alcohol use No      Family History   Problem Relation Age of Onset    Hypertension Mother     Cancer Father         Objective:     Vitals:    09/15/17 1401 09/15/17 1551   BP: (!) 185/93 173/90   Pulse: 78    Resp: 20    Temp: 98 °F (36.7 °C)    TempSrc: Oral    SpO2: 97%    Weight: 150 lb (68 kg)    Height: 5' 4\" (1.626 m)        Review of Systems   Constitutional: Negative. Eyes: Negative. Respiratory: Positive for cough. Cardiovascular: Negative. Gastrointestinal: Negative. Genitourinary: Negative. Musculoskeletal: Negative. Skin: Negative. Neurological: Negative. Psychiatric/Behavioral: Negative. Physical Exam   Constitutional: She is oriented to person, place, and time. She appears well-developed and well-nourished. No distress. Well-appearing elderly  female. HENT:   Head: Normocephalic and atraumatic. Hard of hearing. Eyes: EOM are normal. Pupils are equal, round, and reactive to light. Neck: Normal range of motion. Neck supple. Cardiovascular: Normal rate, regular rhythm and normal heart sounds. Pulmonary/Chest: Effort normal and breath sounds normal. No respiratory distress. Dry non-productive cough. Abdominal: She exhibits no distension. Musculoskeletal: Normal range of motion. She exhibits no edema, tenderness or deformity. Neurological: She is alert and oriented to person, place, and time. Skin: Skin is warm and dry. She is not diaphoretic. Psychiatric: She has a normal mood and affect. Her behavior is normal.   Nursing note and vitals reviewed. Assessment/ Plan:   Diagnoses and all orders for this visit:    1.  Chronic cough   Will order   -     REFERRAL TO PULMONARY DISEASE  - ALLERGEN (24) PANEL  -     promethazine-dextromethorphan (PROMETHAZINE-DM) 6.25-15 mg/5 mL syrup; Take 5 mL by mouth every four (4) hours as needed for Cough for up to 7 days. 2. Essential hypertension   Will increase  -     valsartan (DIOVAN) 160 mg tablet; Take 1 Tab by mouth daily. 3. Gastroesophageal reflux disease without esophagitis    Patient's plan of care has been reviewed with them. Patient and/or family have verbally conveyed their understanding and agreement of the patient's signs, symptoms, diagnosis, treatment and prognosis and additionally agree to follow up as recommended or return to [facility] should their condition change prior to follow-up. Discharge instructions have also been provided to the patient with some educational information regarding their diagnosis as well a list of reasons why they would want to return to the office prior to their follow-up appointment should their condition change. Follow-up in 1 month for BP check.

## 2017-09-18 LAB
A ALTERNATA IGE QN: <0.1 KU/L
A FUMIGATUS IGE QN: <0.1 KU/L
BERMUDA GRASS IGE QN: <0.1 KU/L
BOXELDER IGE QN: <0.1 KU/L
C HERBARUM IGE QN: <0.1 KU/L
CAT DANDER IGE QN: <0.1 KU/L
CMN PIGWEED IGE QN: <0.1 KU/L
COMMON RAGWEED IGE QN: <0.1 KU/L
COTTONWOOD IGE QN: <0.1 KU/L
D FARINAE IGE QN: <0.1 KU/L
D PTERONYSS IGE QN: <0.1 KU/L
DOG DANDER IGE QN: <0.1 KU/L
IGE SERPL-ACNC: 4 IU/ML (ref 0–100)
JOHNSON GRASS IGE QN: <0.1 KU/L
Lab: NORMAL
MOUSE URINE PROT IGE QN: <0.1 KU/L
MT JUNIPER IGE QN: <0.1 KU/L
P NOTATUM IGE QN: <0.1 KU/L
PECAN/HICK TREE IGE QN: <0.1 KU/L
ROACH IGE QN: <0.1 KU/L
SHEEP SORREL IGE QN: <0.1 KU/L
SILVER BIRCH IGE QN: <0.1 KU/L
TIMOTHY IGE QN: <0.1 KU/L
WHITE ELM IGE QN: <0.1 KU/L
WHITE MULBERRY IGE QN: <0.1 KU/L
WHITE OAK IGE QN: <0.1 KU/L

## 2017-11-13 RX ORDER — ERGOCALCIFEROL 1.25 MG/1
50000 CAPSULE ORAL
Qty: 12 CAP | Refills: 0 | OUTPATIENT
Start: 2017-11-13

## 2019-12-26 ENCOUNTER — ED HISTORICAL/CONVERTED ENCOUNTER (OUTPATIENT)
Dept: OTHER | Age: 84
End: 2019-12-26

## 2020-06-15 ENCOUNTER — OP HISTORICAL/CONVERTED ENCOUNTER (OUTPATIENT)
Dept: OTHER | Age: 85
End: 2020-06-15

## 2020-06-22 ENCOUNTER — OP HISTORICAL/CONVERTED ENCOUNTER (OUTPATIENT)
Dept: OTHER | Age: 85
End: 2020-06-22

## 2020-06-29 ENCOUNTER — OP HISTORICAL/CONVERTED ENCOUNTER (OUTPATIENT)
Dept: OTHER | Age: 85
End: 2020-06-29

## 2020-07-14 ENCOUNTER — OP HISTORICAL/CONVERTED ENCOUNTER (OUTPATIENT)
Dept: OTHER | Age: 85
End: 2020-07-14

## 2020-07-28 ENCOUNTER — OP HISTORICAL/CONVERTED ENCOUNTER (OUTPATIENT)
Dept: OTHER | Age: 85
End: 2020-07-28

## 2020-08-17 ENCOUNTER — OP HISTORICAL/CONVERTED ENCOUNTER (OUTPATIENT)
Dept: OTHER | Age: 85
End: 2020-08-17

## 2021-01-01 ENCOUNTER — TRANSCRIBE ORDER (OUTPATIENT)
Dept: SCHEDULING | Age: 86
End: 2021-01-01

## 2021-01-01 ENCOUNTER — APPOINTMENT (OUTPATIENT)
Dept: GENERAL RADIOLOGY | Age: 86
DRG: 177 | End: 2021-01-01
Attending: FAMILY MEDICINE
Payer: MEDICARE

## 2021-01-01 ENCOUNTER — HOSPITAL ENCOUNTER (OUTPATIENT)
Dept: LAB | Age: 86
Discharge: HOME | End: 2021-02-08

## 2021-01-01 ENCOUNTER — HOSPITAL ENCOUNTER (INPATIENT)
Age: 86
LOS: 13 days | DRG: 177 | End: 2021-12-30
Attending: EMERGENCY MEDICINE | Admitting: HOSPITALIST
Payer: MEDICARE

## 2021-01-01 ENCOUNTER — APPOINTMENT (OUTPATIENT)
Dept: GENERAL RADIOLOGY | Age: 86
DRG: 064 | End: 2021-01-01
Attending: NURSE PRACTITIONER
Payer: MEDICARE

## 2021-01-01 ENCOUNTER — APPOINTMENT (OUTPATIENT)
Dept: CT IMAGING | Age: 86
DRG: 064 | End: 2021-01-01
Attending: EMERGENCY MEDICINE
Payer: MEDICARE

## 2021-01-01 ENCOUNTER — HOSPITAL ENCOUNTER (OUTPATIENT)
Age: 86
Discharge: HOME | End: 2021-03-25
Attending: INTERNAL MEDICINE | Admitting: INTERNAL MEDICINE
Payer: MEDICARE

## 2021-01-01 ENCOUNTER — APPOINTMENT (OUTPATIENT)
Dept: GENERAL RADIOLOGY | Age: 86
DRG: 177 | End: 2021-01-01
Attending: INTERNAL MEDICINE
Payer: MEDICARE

## 2021-01-01 ENCOUNTER — HOSPITAL ENCOUNTER (EMERGENCY)
Age: 86
Discharge: HOME | End: 2021-02-20
Attending: EMERGENCY MEDICINE
Payer: MEDICARE

## 2021-01-01 ENCOUNTER — APPOINTMENT (OUTPATIENT)
Dept: GENERAL RADIOLOGY | Age: 86
DRG: 177 | End: 2021-01-01
Attending: HOSPITALIST
Payer: MEDICARE

## 2021-01-01 ENCOUNTER — HOSPITAL ENCOUNTER (OUTPATIENT)
Dept: NON INVASIVE DIAGNOSTICS | Age: 86
Discharge: HOME | End: 2021-03-24
Attending: INTERNAL MEDICINE | Admitting: INTERNAL MEDICINE
Payer: MEDICARE

## 2021-01-01 ENCOUNTER — APPOINTMENT (OUTPATIENT)
Dept: GENERAL RADIOLOGY | Age: 86
End: 2021-01-01
Attending: EMERGENCY MEDICINE
Payer: MEDICARE

## 2021-01-01 ENCOUNTER — HOSPITAL ENCOUNTER (INPATIENT)
Age: 86
LOS: 2 days | Discharge: NURSING, REHAB OR LONG-TERM CARE | DRG: 064 | End: 2021-02-07
Attending: EMERGENCY MEDICINE | Admitting: HOSPITALIST
Payer: MEDICARE

## 2021-01-01 ENCOUNTER — APPOINTMENT (OUTPATIENT)
Dept: NON INVASIVE DIAGNOSTICS | Age: 86
DRG: 064 | End: 2021-01-01
Attending: STUDENT IN AN ORGANIZED HEALTH CARE EDUCATION/TRAINING PROGRAM
Payer: MEDICARE

## 2021-01-01 ENCOUNTER — APPOINTMENT (OUTPATIENT)
Dept: GENERAL RADIOLOGY | Age: 86
DRG: 177 | End: 2021-01-01
Attending: STUDENT IN AN ORGANIZED HEALTH CARE EDUCATION/TRAINING PROGRAM
Payer: MEDICARE

## 2021-01-01 ENCOUNTER — HOSPITAL ENCOUNTER (OUTPATIENT)
Dept: LAB | Age: 86
Discharge: HOME | End: 2021-02-14

## 2021-01-01 VITALS
DIASTOLIC BLOOD PRESSURE: 98 MMHG | WEIGHT: 140 LBS | HEIGHT: 63 IN | RESPIRATION RATE: 17 BRPM | TEMPERATURE: 97.5 F | OXYGEN SATURATION: 98 % | BODY MASS INDEX: 24.8 KG/M2 | SYSTOLIC BLOOD PRESSURE: 143 MMHG | HEART RATE: 78 BPM

## 2021-01-01 VITALS
BODY MASS INDEX: 25.59 KG/M2 | HEIGHT: 64 IN | SYSTOLIC BLOOD PRESSURE: 128 MMHG | DIASTOLIC BLOOD PRESSURE: 66 MMHG | TEMPERATURE: 98.5 F | HEART RATE: 70 BPM | WEIGHT: 149.91 LBS | OXYGEN SATURATION: 91 % | RESPIRATION RATE: 18 BRPM

## 2021-01-01 VITALS
DIASTOLIC BLOOD PRESSURE: 58 MMHG | WEIGHT: 150 LBS | HEART RATE: 75 BPM | BODY MASS INDEX: 26.58 KG/M2 | TEMPERATURE: 97.5 F | OXYGEN SATURATION: 97 % | RESPIRATION RATE: 20 BRPM | HEIGHT: 63 IN | SYSTOLIC BLOOD PRESSURE: 95 MMHG

## 2021-01-01 VITALS — HEIGHT: 60 IN | TEMPERATURE: 97 F | WEIGHT: 156.75 LBS | BODY MASS INDEX: 30.77 KG/M2

## 2021-01-01 DIAGNOSIS — R68.89 ABNORMAL ANKLE BRACHIAL INDEX (ABI): ICD-10-CM

## 2021-01-01 DIAGNOSIS — I95.9 HYPOTENSION, UNSPECIFIED HYPOTENSION TYPE: ICD-10-CM

## 2021-01-01 DIAGNOSIS — I35.0 SEVERE AORTIC STENOSIS: ICD-10-CM

## 2021-01-01 DIAGNOSIS — R09.02 HYPOXIA: ICD-10-CM

## 2021-01-01 DIAGNOSIS — M79.89 PAIN AND SWELLING OF LOWER EXTREMITY: ICD-10-CM

## 2021-01-01 DIAGNOSIS — M79.606 PAIN AND SWELLING OF LOWER EXTREMITY: ICD-10-CM

## 2021-01-01 DIAGNOSIS — R94.39 ABNORMAL BALLISTOCARDIOGRAM: Primary | ICD-10-CM

## 2021-01-01 DIAGNOSIS — R06.02 SOB (SHORTNESS OF BREATH): ICD-10-CM

## 2021-01-01 DIAGNOSIS — J12.82 PNEUMONIA DUE TO COVID-19 VIRUS: Primary | ICD-10-CM

## 2021-01-01 DIAGNOSIS — U07.1 PNEUMONIA DUE TO COVID-19 VIRUS: Primary | ICD-10-CM

## 2021-01-01 DIAGNOSIS — G45.9 TIA (TRANSIENT ISCHEMIC ATTACK): Primary | ICD-10-CM

## 2021-01-01 DIAGNOSIS — E86.0 DEHYDRATION: Primary | ICD-10-CM

## 2021-01-01 DIAGNOSIS — I35.0 SEVERE AORTIC STENOSIS: Primary | ICD-10-CM

## 2021-01-01 LAB
25(OH)D3 SERPL-MCNC: 57.2 NG/ML (ref 30–100)
ACT BLD: 278 SEC (ref 74–125)
ALBUMIN SERPL-MCNC: 2.2 G/DL (ref 3.5–5)
ALBUMIN SERPL-MCNC: 2.7 G/DL (ref 3.5–5)
ALBUMIN SERPL-MCNC: 3 G/DL (ref 3.5–5)
ALBUMIN SERPL-MCNC: 3.1 G/DL (ref 3.5–5)
ALBUMIN SERPL-MCNC: 3.2 G/DL (ref 3.5–5)
ALBUMIN SERPL-MCNC: 3.3 G/DL (ref 3.5–5)
ALBUMIN/GLOB SERPL: 0.7 {RATIO} (ref 1.1–2.2)
ALBUMIN/GLOB SERPL: 0.8 {RATIO} (ref 1.1–2.2)
ALBUMIN/GLOB SERPL: 0.9 {RATIO} (ref 1.1–2.2)
ALBUMIN/GLOB SERPL: 0.9 {RATIO} (ref 1.1–2.2)
ALP SERPL-CCNC: 102 U/L (ref 45–117)
ALP SERPL-CCNC: 116 U/L (ref 45–117)
ALP SERPL-CCNC: 127 U/L (ref 45–117)
ALP SERPL-CCNC: 75 U/L (ref 45–117)
ALP SERPL-CCNC: 77 U/L (ref 45–117)
ALP SERPL-CCNC: 81 U/L (ref 45–117)
ALP SERPL-CCNC: 84 U/L (ref 45–117)
ALP SERPL-CCNC: 88 U/L (ref 45–117)
ALP SERPL-CCNC: 90 U/L (ref 45–117)
ALP SERPL-CCNC: 91 U/L (ref 45–117)
ALP SERPL-CCNC: 96 U/L (ref 45–117)
ALT SERPL-CCNC: 21 U/L (ref 12–78)
ALT SERPL-CCNC: 22 U/L (ref 12–78)
ALT SERPL-CCNC: 24 U/L (ref 12–78)
ALT SERPL-CCNC: 25 U/L (ref 12–78)
ALT SERPL-CCNC: 32 U/L (ref 12–78)
ALT SERPL-CCNC: 46 U/L (ref 12–78)
ALT SERPL-CCNC: 64 U/L (ref 12–78)
ALT SERPL-CCNC: 65 U/L (ref 12–78)
ALT SERPL-CCNC: 83 U/L (ref 12–78)
ANION GAP SERPL CALC-SCNC: 10 MMOL/L (ref 5–15)
ANION GAP SERPL CALC-SCNC: 13 MMOL/L (ref 5–15)
ANION GAP SERPL CALC-SCNC: 5 MMOL/L (ref 5–15)
ANION GAP SERPL CALC-SCNC: 6 MMOL/L (ref 5–15)
ANION GAP SERPL CALC-SCNC: 7 MMOL/L (ref 5–15)
ANION GAP SERPL CALC-SCNC: 7 MMOL/L (ref 5–15)
ANION GAP SERPL CALC-SCNC: 9 MMOL/L (ref 5–15)
ANION GAP SERPL CALC-SCNC: 9 MMOL/L (ref 5–15)
APPEARANCE UR: ABNORMAL
APPEARANCE UR: CLEAR
APPEARANCE UR: CLEAR
APTT PPP: 25.5 SEC (ref 23–35.7)
APTT PPP: 32.1 SEC (ref 23–35.7)
APTT PPP: 32.9 SEC (ref 21.2–34.1)
ARTERIAL PATENCY WRIST A: ABNORMAL
AST SERPL W P-5'-P-CCNC: 23 U/L (ref 15–37)
AST SERPL W P-5'-P-CCNC: 24 U/L (ref 15–37)
AST SERPL W P-5'-P-CCNC: 26 U/L (ref 15–37)
AST SERPL W P-5'-P-CCNC: 27 U/L (ref 15–37)
AST SERPL W P-5'-P-CCNC: 33 U/L (ref 15–37)
AST SERPL W P-5'-P-CCNC: 42 U/L (ref 15–37)
AST SERPL W P-5'-P-CCNC: 46 U/L (ref 15–37)
AST SERPL W P-5'-P-CCNC: 58 U/L (ref 15–37)
AST SERPL W P-5'-P-CCNC: 64 U/L (ref 15–37)
AST SERPL W P-5'-P-CCNC: 67 U/L (ref 15–37)
AST SERPL W P-5'-P-CCNC: 96 U/L (ref 15–37)
ATRIAL RATE: 62 BPM
ATRIAL RATE: 66 BPM
ATRIAL RATE: 67 BPM
ATRIAL RATE: 78 BPM
ATRIAL RATE: 78 BPM
ATRIAL RATE: 79 BPM
BACTERIA SPEC CULT: ABNORMAL
BACTERIA URNS QL MICRO: NEGATIVE /HPF
BASE DEFICIT BLDA-SCNC: 0.3 MMOL/L (ref 0–2)
BASE DEFICIT BLDA-SCNC: 0.7 MMOL/L (ref 0–2)
BASE DEFICIT BLDA-SCNC: 0.8 MMOL/L (ref 0–2)
BASE DEFICIT BLDA-SCNC: 1.2 MMOL/L (ref 0–2)
BASE DEFICIT BLDA-SCNC: 1.5 MMOL/L (ref 0–2)
BASE DEFICIT BLDA-SCNC: 2.2 MMOL/L (ref 0–2)
BASE DEFICIT BLDA-SCNC: 3.2 MMOL/L (ref 0–2)
BASOPHILS # BLD: 0 K/UL (ref 0–0.1)
BASOPHILS # BLD: 0.1 K/UL (ref 0–0.1)
BASOPHILS # BLD: 0.1 K/UL (ref 0–0.1)
BASOPHILS # BLD: 0.2 K/UL (ref 0–0.1)
BASOPHILS NFR BLD: 0 % (ref 0–1)
BASOPHILS NFR BLD: 1 % (ref 0–1)
BDY SITE: ABNORMAL
BILIRUB DIRECT SERPL-MCNC: 0.2 MG/DL (ref 0–0.2)
BILIRUB SERPL-MCNC: 0.4 MG/DL (ref 0.2–1)
BILIRUB SERPL-MCNC: 0.5 MG/DL (ref 0.2–1)
BILIRUB SERPL-MCNC: 0.6 MG/DL (ref 0.2–1)
BILIRUB SERPL-MCNC: 0.7 MG/DL (ref 0.2–1)
BILIRUB SERPL-MCNC: 0.7 MG/DL (ref 0.2–1)
BILIRUB SERPL-MCNC: 0.8 MG/DL (ref 0.2–1)
BILIRUB UR QL: NEGATIVE
BNP SERPL-MCNC: 546 PG/ML
BUN SERPL-MCNC: 15 MG/DL (ref 6–20)
BUN SERPL-MCNC: 16 MG/DL (ref 6–20)
BUN SERPL-MCNC: 17 MG/DL (ref 6–20)
BUN SERPL-MCNC: 19 MG/DL (ref 6–20)
BUN SERPL-MCNC: 29 MG/DL (ref 6–20)
BUN SERPL-MCNC: 33 MG/DL (ref 6–20)
BUN SERPL-MCNC: 34 MG/DL (ref 6–20)
BUN SERPL-MCNC: 36 MG/DL (ref 6–20)
BUN SERPL-MCNC: 38 MG/DL (ref 6–20)
BUN SERPL-MCNC: 40 MG/DL (ref 6–20)
BUN SERPL-MCNC: 42 MG/DL (ref 6–20)
BUN SERPL-MCNC: 44 MG/DL (ref 6–20)
BUN SERPL-MCNC: 51 MG/DL (ref 6–20)
BUN SERPL-MCNC: 51 MG/DL (ref 6–20)
BUN SERPL-MCNC: 55 MG/DL (ref 6–20)
BUN/CREAT SERPL: 16 (ref 12–20)
BUN/CREAT SERPL: 16 (ref 12–20)
BUN/CREAT SERPL: 17 (ref 12–20)
BUN/CREAT SERPL: 17 (ref 12–20)
BUN/CREAT SERPL: 18 (ref 12–20)
BUN/CREAT SERPL: 19 (ref 12–20)
BUN/CREAT SERPL: 28 (ref 12–20)
BUN/CREAT SERPL: 36 (ref 12–20)
BUN/CREAT SERPL: 40 (ref 12–20)
BUN/CREAT SERPL: 42 (ref 12–20)
BUN/CREAT SERPL: 50 (ref 12–20)
BUN/CREAT SERPL: 56 (ref 12–20)
BUN/CREAT SERPL: 65 (ref 12–20)
BUN/CREAT SERPL: 67 (ref 12–20)
BUN/CREAT SERPL: 69 (ref 12–20)
CA-I BLD-MCNC: 7.7 MG/DL (ref 8.5–10.1)
CA-I BLD-MCNC: 8.1 MG/DL (ref 8.5–10.1)
CA-I BLD-MCNC: 8.3 MG/DL (ref 8.5–10.1)
CA-I BLD-MCNC: 8.4 MG/DL (ref 8.5–10.1)
CA-I BLD-MCNC: 8.6 MG/DL (ref 8.5–10.1)
CA-I BLD-MCNC: 8.7 MG/DL (ref 8.5–10.1)
CA-I BLD-MCNC: 8.8 MG/DL (ref 8.5–10.1)
CA-I BLD-MCNC: 8.8 MG/DL (ref 8.5–10.1)
CA-I BLD-MCNC: 9.1 MG/DL (ref 8.5–10.1)
CA-I BLD-MCNC: 9.1 MG/DL (ref 8.5–10.1)
CA-I BLD-MCNC: 9.2 MG/DL (ref 8.5–10.1)
CA-I BLD-MCNC: 9.4 MG/DL (ref 8.5–10.1)
CALCULATED P AXIS, ECG09: 17 DEGREES
CALCULATED P AXIS, ECG09: 22 DEGREES
CALCULATED P AXIS, ECG09: 30 DEGREES
CALCULATED P AXIS, ECG09: 46 DEGREES
CALCULATED P AXIS, ECG09: 52 DEGREES
CALCULATED P AXIS, ECG09: 60 DEGREES
CALCULATED R AXIS, ECG10: -16 DEGREES
CALCULATED R AXIS, ECG10: -19 DEGREES
CALCULATED R AXIS, ECG10: -29 DEGREES
CALCULATED R AXIS, ECG10: -30 DEGREES
CALCULATED R AXIS, ECG10: -32 DEGREES
CALCULATED R AXIS, ECG10: 8 DEGREES
CALCULATED T AXIS, ECG11: 112 DEGREES
CALCULATED T AXIS, ECG11: 17 DEGREES
CALCULATED T AXIS, ECG11: 23 DEGREES
CALCULATED T AXIS, ECG11: 51 DEGREES
CALCULATED T AXIS, ECG11: 70 DEGREES
CALCULATED T AXIS, ECG11: 73 DEGREES
CHLORIDE SERPL-SCNC: 102 MMOL/L (ref 97–108)
CHLORIDE SERPL-SCNC: 103 MMOL/L (ref 97–108)
CHLORIDE SERPL-SCNC: 106 MMOL/L (ref 97–108)
CHLORIDE SERPL-SCNC: 106 MMOL/L (ref 97–108)
CHLORIDE SERPL-SCNC: 108 MMOL/L (ref 97–108)
CHLORIDE SERPL-SCNC: 110 MMOL/L (ref 97–108)
CHLORIDE SERPL-SCNC: 111 MMOL/L (ref 97–108)
CHLORIDE SERPL-SCNC: 111 MMOL/L (ref 97–108)
CHLORIDE SERPL-SCNC: 114 MMOL/L (ref 97–108)
CHLORIDE SERPL-SCNC: 115 MMOL/L (ref 97–108)
CHLORIDE SERPL-SCNC: 115 MMOL/L (ref 97–108)
CHLORIDE SERPL-SCNC: 116 MMOL/L (ref 97–108)
CHLORIDE SERPL-SCNC: 119 MMOL/L (ref 97–108)
CHOLEST SERPL-MCNC: 217 MG/DL
CO2 SERPL-SCNC: 22 MMOL/L (ref 21–32)
CO2 SERPL-SCNC: 23 MMOL/L (ref 21–32)
CO2 SERPL-SCNC: 23 MMOL/L (ref 21–32)
CO2 SERPL-SCNC: 24 MMOL/L (ref 21–32)
CO2 SERPL-SCNC: 25 MMOL/L (ref 21–32)
CO2 SERPL-SCNC: 27 MMOL/L (ref 21–32)
CO2 SERPL-SCNC: 29 MMOL/L (ref 21–32)
CO2 SERPL-SCNC: 30 MMOL/L (ref 21–32)
CO2 SERPL-SCNC: 31 MMOL/L (ref 21–32)
CO2 SERPL-SCNC: 31 MMOL/L (ref 21–32)
COLONY COUNT,CNT: ABNORMAL
COLOR UR: ABNORMAL
COLOR UR: ABNORMAL
COLOR UR: NORMAL
COVID-19 RAPID TEST, COVR: DETECTED
CREAT SERPL-MCNC: 0.74 MG/DL (ref 0.55–1.02)
CREAT SERPL-MCNC: 0.76 MG/DL (ref 0.55–1.02)
CREAT SERPL-MCNC: 0.78 MG/DL (ref 0.55–1.02)
CREAT SERPL-MCNC: 0.8 MG/DL (ref 0.55–1.02)
CREAT SERPL-MCNC: 0.85 MG/DL (ref 0.55–1.02)
CREAT SERPL-MCNC: 0.94 MG/DL (ref 0.55–1.02)
CREAT SERPL-MCNC: 0.96 MG/DL (ref 0.55–1.02)
CREAT SERPL-MCNC: 0.96 MG/DL (ref 0.55–1.02)
CREAT SERPL-MCNC: 0.99 MG/DL (ref 0.55–1.02)
CREAT SERPL-MCNC: 0.99 MG/DL (ref 0.55–1.02)
CREAT SERPL-MCNC: 1.07 MG/DL (ref 0.55–1.02)
CREAT SERPL-MCNC: 1.15 MG/DL (ref 0.55–1.02)
CREAT SERPL-MCNC: 1.22 MG/DL (ref 0.55–1.02)
CREAT SERPL-MCNC: 1.53 MG/DL (ref 0.55–1.02)
CREAT SERPL-MCNC: 1.79 MG/DL (ref 0.55–1.02)
CRP SERPL-MCNC: 1.45 MG/DL (ref 0–0.6)
CRP SERPL-MCNC: 1.95 MG/DL (ref 0–0.6)
CRP SERPL-MCNC: 11.2 MG/DL (ref 0–0.6)
CRP SERPL-MCNC: 2.01 MG/DL (ref 0–0.6)
CRP SERPL-MCNC: 2.08 MG/DL (ref 0–0.6)
CRP SERPL-MCNC: 3.04 MG/DL (ref 0–0.6)
CRP SERPL-MCNC: 3.51 MG/DL (ref 0–0.6)
CRP SERPL-MCNC: 3.56 MG/DL (ref 0–0.6)
CRP SERPL-MCNC: 7.08 MG/DL (ref 0–0.6)
D DIMER PPP FEU-MCNC: 0.33 UG/ML(FEU)
D DIMER PPP FEU-MCNC: 0.34 UG/ML(FEU)
D DIMER PPP FEU-MCNC: 3.37 UG/ML(FEU)
DATE LAST DOSE: ABNORMAL
DATE LAST DOSE: ABNORMAL
DATE LAST DOSE: NORMAL
DIAGNOSIS, 93000: NORMAL
DIFFERENTIAL METHOD BLD: ABNORMAL
ECHO AV AREA PEAK VELOCITY: 0.78 CM2
ECHO AV AREA VTI: 0.78 CM2
ECHO AV AREA/BSA PEAK VELOCITY: 0.5 CM2/M2
ECHO AV AREA/BSA VTI: 0.5 CM2/M2
ECHO AV MEAN GRADIENT: 50 MMHG
ECHO AV MEAN VELOCITY: 319 CM/S
ECHO AV PEAK GRADIENT: 93 MMHG
ECHO AV VTI: 98.2 CM
ECHO EST RA PRESSURE: 3 MMHG
ECHO LA AREA 4C: 25.47 CM2
ECHO LV E' SEPTAL VELOCITY: 6.04 CM/S
ECHO LV EDV A2C: 94.8 CM3
ECHO LV EJECTION FRACTION A2C: 70 %
ECHO LV EJECTION FRACTION BIPLANE: 70 % (ref 55–100)
ECHO LV ESV A2C: 13.7 CM3
ECHO LV INTERNAL DIMENSION DIASTOLIC: 4.56 CM (ref 3.9–5.3)
ECHO LV INTERNAL DIMENSION SYSTOLIC: 2.39 CM
ECHO LV IVSD: 1.33 CM (ref 0.6–0.9)
ECHO LV MASS 2D: 237.5 G (ref 67–162)
ECHO LV MASS INDEX 2D: 137.2 G/M2 (ref 43–95)
ECHO LV POSTERIOR WALL DIASTOLIC: 1.35 CM (ref 0.6–0.9)
ECHO LVOT DIAM: 1.9 CM
ECHO LVOT PEAK GRADIENT: 7 MMHG
ECHO LVOT SV: 77 CM3
ECHO LVOT VTI: 27.1 CM
ECHO LVOT VTI: 27.1 CM
ECHO MV A VELOCITY: 116 CM/S
ECHO MV AREA PHT: 2.53 CM2
ECHO MV E DECELERATION TIME (DT): 268 MS
ECHO MV E VELOCITY: 113 CM/S
ECHO MV E/A RATIO: 0.97
ECHO MV E/E' SEPTAL: 18.71
ECHO MV PRESSURE HALF TIME (PHT): 87 MS
ECHO PV PEAK INSTANTANEOUS GRADIENT SYSTOLIC: 2 MMHG
ECHO PV PEAK INSTANTANEOUS GRADIENT SYSTOLIC: 6 MMHG
ECHO PV REGURGITANT MAX VELOCITY: 124 CM/S
ECHO PV REGURGITANT MAX VELOCITY: 133 CM/S
ECHO PV REGURGITANT MAX VELOCITY: 482 CM/S
ECHO PV REGURGITANT MAX VELOCITY: 604 CM/S
ECHO PV REGURGITANT MAX VELOCITY: 75.4 CM/S
ECHO PVEIN A DURATION: 85 MS
ECHO PVEIN A VELOCITY: 32.6 CM/S
ECHO RA AREA 4C: 13.9 CM2
ECHO RIGHT VENTRICULAR SYSTOLIC PRESSURE (RVSP): 40 MMHG
ECHO RV INTERNAL DIMENSION: 3.68 CM
ECHO TV MAX VELOCITY: 305 CM/S
ECHO TV REGURGITANT PEAK GRADIENT: 37 MMHG
EOSINOPHIL # BLD: 0 K/UL (ref 0–0.4)
EOSINOPHIL # BLD: 0.1 K/UL (ref 0–0.4)
EOSINOPHIL # BLD: 0.2 K/UL (ref 0–0.4)
EOSINOPHIL # BLD: 0.3 K/UL (ref 0–0.4)
EOSINOPHIL # BLD: 1.3 K/UL (ref 0–0.4)
EOSINOPHIL # BLD: 1.4 K/UL (ref 0–0.4)
EOSINOPHIL NFR BLD: 0 % (ref 0–7)
EOSINOPHIL NFR BLD: 1 % (ref 0–7)
EOSINOPHIL NFR BLD: 13 % (ref 0–7)
EOSINOPHIL NFR BLD: 16 % (ref 0–7)
EOSINOPHIL NFR BLD: 3 % (ref 0–7)
EOSINOPHIL NFR BLD: 4 % (ref 0–7)
EPAP/CPAP/PEEP, PAPEEP: 0
EPAP/CPAP/PEEP, PAPEEP: 0
EPAP/CPAP/PEEP, PAPEEP: 8
ERYTHROCYTE [DISTWIDTH] IN BLOOD BY AUTOMATED COUNT: 14.3 % (ref 11.5–14.5)
ERYTHROCYTE [DISTWIDTH] IN BLOOD BY AUTOMATED COUNT: 14.4 % (ref 11.5–14.5)
ERYTHROCYTE [DISTWIDTH] IN BLOOD BY AUTOMATED COUNT: 14.5 % (ref 11.5–14.5)
ERYTHROCYTE [DISTWIDTH] IN BLOOD BY AUTOMATED COUNT: 14.6 % (ref 11.5–14.5)
ERYTHROCYTE [DISTWIDTH] IN BLOOD BY AUTOMATED COUNT: 14.6 % (ref 11.5–14.5)
ERYTHROCYTE [DISTWIDTH] IN BLOOD BY AUTOMATED COUNT: 14.7 % (ref 11.5–14.5)
EST. AVERAGE GLUCOSE BLD GHB EST-MCNC: 103 MG/DL
FERRITIN SERPL-MCNC: 168 NG/ML (ref 8–252)
FERRITIN SERPL-MCNC: 192 NG/ML (ref 8–252)
FIBRINOGEN PPP-MCNC: 385 MG/DL (ref 220–535)
FIO2 ON VENT: 100 %
FIO2 ON VENT: 100 %
FIO2 ON VENT: 21 %
FIO2 ON VENT: 60 %
FIO2 ON VENT: 60 %
FIO2 ON VENT: 75 %
FIO2 ON VENT: 90 %
FLUAV AG NPH QL IA: NEGATIVE
FLUBV AG NOSE QL IA: NEGATIVE
GAS FLOW.O2 O2 DELIVERY SYS: 60 L/MIN
GAS FLOW.O2 SETTING OXYMISER: 14 L/MIN
GLOBULIN SER CALC-MCNC: 3.2 G/DL (ref 2–4)
GLOBULIN SER CALC-MCNC: 3.5 G/DL (ref 2–4)
GLOBULIN SER CALC-MCNC: 3.6 G/DL (ref 2–4)
GLOBULIN SER CALC-MCNC: 3.7 G/DL (ref 2–4)
GLOBULIN SER CALC-MCNC: 3.7 G/DL (ref 2–4)
GLOBULIN SER CALC-MCNC: 3.8 G/DL (ref 2–4)
GLOBULIN SER CALC-MCNC: 3.9 G/DL (ref 2–4)
GLOBULIN SER CALC-MCNC: 4.1 G/DL (ref 2–4)
GLUCOSE BLD STRIP.AUTO-MCNC: 180 MG/DL (ref 65–117)
GLUCOSE SERPL-MCNC: 105 MG/DL (ref 65–100)
GLUCOSE SERPL-MCNC: 112 MG/DL (ref 65–100)
GLUCOSE SERPL-MCNC: 126 MG/DL (ref 65–100)
GLUCOSE SERPL-MCNC: 126 MG/DL (ref 65–100)
GLUCOSE SERPL-MCNC: 130 MG/DL (ref 65–100)
GLUCOSE SERPL-MCNC: 139 MG/DL (ref 65–100)
GLUCOSE SERPL-MCNC: 148 MG/DL (ref 65–100)
GLUCOSE SERPL-MCNC: 175 MG/DL (ref 65–100)
GLUCOSE SERPL-MCNC: 202 MG/DL (ref 65–100)
GLUCOSE SERPL-MCNC: 206 MG/DL (ref 65–100)
GLUCOSE SERPL-MCNC: 219 MG/DL (ref 65–100)
GLUCOSE SERPL-MCNC: 85 MG/DL (ref 65–100)
GLUCOSE SERPL-MCNC: 93 MG/DL (ref 65–100)
GLUCOSE SERPL-MCNC: 95 MG/DL (ref 65–100)
GLUCOSE SERPL-MCNC: 96 MG/DL (ref 65–100)
GLUCOSE UR STRIP.AUTO-MCNC: NEGATIVE MG/DL
HBA1C MFR BLD: 5.2 % (ref 4–5.6)
HCO3 BLDA-SCNC: 22 MMOL/L (ref 22–26)
HCO3 BLDA-SCNC: 23 MMOL/L (ref 22–26)
HCO3 BLDA-SCNC: 24 MMOL/L (ref 22–26)
HCT VFR BLD AUTO: 31 % (ref 35–47)
HCT VFR BLD AUTO: 33 % (ref 35–47)
HCT VFR BLD AUTO: 34.4 % (ref 35–47)
HCT VFR BLD AUTO: 35 % (ref 35–47)
HCT VFR BLD AUTO: 35.3 % (ref 35–47)
HCT VFR BLD AUTO: 36.7 % (ref 35–47)
HCT VFR BLD AUTO: 37.1 % (ref 35–47)
HCT VFR BLD AUTO: 37.1 % (ref 35–47)
HCT VFR BLD AUTO: 37.5 % (ref 35–47)
HCT VFR BLD AUTO: 37.9 % (ref 35–47)
HCT VFR BLD AUTO: 38.7 % (ref 35–47)
HCT VFR BLD AUTO: 38.9 % (ref 35–47)
HCT VFR BLD AUTO: 39.3 % (ref 35–47)
HCT VFR BLD AUTO: 39.3 % (ref 35–47)
HCT VFR BLD AUTO: 39.4 % (ref 35–47)
HCT VFR BLD AUTO: 41.2 % (ref 35–47)
HCT VFR BLD AUTO: 41.4 % (ref 35–47)
HDLC SERPL-MCNC: 54 MG/DL
HDLC SERPL: 4 {RATIO} (ref 0–5)
HGB BLD-MCNC: 10.3 G/DL (ref 11.5–16)
HGB BLD-MCNC: 11 G/DL (ref 11.5–16)
HGB BLD-MCNC: 11.6 G/DL (ref 11.5–16)
HGB BLD-MCNC: 11.7 G/DL (ref 11.5–16)
HGB BLD-MCNC: 11.9 G/DL (ref 11.5–16)
HGB BLD-MCNC: 12.2 G/DL (ref 11.5–16)
HGB BLD-MCNC: 12.2 G/DL (ref 11.5–16)
HGB BLD-MCNC: 12.3 G/DL (ref 11.5–16)
HGB BLD-MCNC: 12.5 G/DL (ref 11.5–16)
HGB BLD-MCNC: 12.7 G/DL (ref 11.5–16)
HGB BLD-MCNC: 12.7 G/DL (ref 11.5–16)
HGB BLD-MCNC: 12.9 G/DL (ref 11.5–16)
HGB BLD-MCNC: 13 G/DL (ref 11.5–16)
HGB BLD-MCNC: 13.1 G/DL (ref 11.5–16)
HGB BLD-MCNC: 13.2 G/DL (ref 11.5–16)
HGB BLD-MCNC: 13.4 G/DL (ref 11.5–16)
HGB BLD-MCNC: 13.7 G/DL (ref 11.5–16)
HGB UR QL STRIP: ABNORMAL
HGB UR QL STRIP: NEGATIVE
HGB UR QL STRIP: NEGATIVE
IMM GRANULOCYTES # BLD AUTO: 0 K/UL
IMM GRANULOCYTES # BLD AUTO: 0 K/UL
IMM GRANULOCYTES # BLD AUTO: 0 K/UL (ref 0–0.04)
IMM GRANULOCYTES # BLD AUTO: 0.1 K/UL (ref 0–0.04)
IMM GRANULOCYTES # BLD AUTO: 0.1 K/UL (ref 0–0.04)
IMM GRANULOCYTES # BLD AUTO: 0.2 K/UL (ref 0–0.04)
IMM GRANULOCYTES # BLD AUTO: 0.2 K/UL (ref 0–0.04)
IMM GRANULOCYTES # BLD AUTO: 0.4 K/UL (ref 0–0.04)
IMM GRANULOCYTES # BLD AUTO: 0.4 K/UL (ref 0–0.04)
IMM GRANULOCYTES # BLD AUTO: 0.5 K/UL (ref 0–0.04)
IMM GRANULOCYTES # BLD AUTO: 0.7 K/UL (ref 0–0.04)
IMM GRANULOCYTES NFR BLD AUTO: 0 %
IMM GRANULOCYTES NFR BLD AUTO: 0 %
IMM GRANULOCYTES NFR BLD AUTO: 0 % (ref 0–0.5)
IMM GRANULOCYTES NFR BLD AUTO: 1 % (ref 0–0.5)
IMM GRANULOCYTES NFR BLD AUTO: 2 % (ref 0–0.5)
IMM GRANULOCYTES NFR BLD AUTO: 2 % (ref 0–0.5)
IMM GRANULOCYTES NFR BLD AUTO: 3 % (ref 0–0.5)
IMM GRANULOCYTES NFR BLD AUTO: 3 % (ref 0–0.5)
INR PPP: 1 (ref 0.9–1.1)
INR PPP: 1.1 (ref 0.9–1.1)
INR PPP: 1.1 (ref 0.9–1.1)
KETONES UR QL STRIP.AUTO: NEGATIVE MG/DL
LACTATE SERPL-SCNC: 1.3 MMOL/L (ref 0.4–2)
LDH SERPL L TO P-CCNC: 301 U/L (ref 81–246)
LDH SERPL L TO P-CCNC: 353 U/L (ref 81–246)
LDH SERPL L TO P-CCNC: 399 U/L (ref 81–246)
LDH SERPL L TO P-CCNC: 461 U/L (ref 81–246)
LDH SERPL L TO P-CCNC: 691 U/L (ref 81–246)
LDH SERPL L TO P-CCNC: 783 U/L (ref 81–246)
LDLC SERPL CALC-MCNC: 147.4 MG/DL (ref 0–100)
LEUKOCYTE ESTERASE UR QL STRIP.AUTO: ABNORMAL
LEUKOCYTE ESTERASE UR QL STRIP.AUTO: NEGATIVE
LEUKOCYTE ESTERASE UR QL STRIP.AUTO: NEGATIVE
LIPID PROFILE,FLP: ABNORMAL
LVOT MG: 4 MMHG
LYMPHOCYTES # BLD: 0.2 K/UL (ref 0.8–3.5)
LYMPHOCYTES # BLD: 0.3 K/UL (ref 0.8–3.5)
LYMPHOCYTES # BLD: 0.4 K/UL (ref 0.8–3.5)
LYMPHOCYTES # BLD: 0.5 K/UL (ref 0.8–3.5)
LYMPHOCYTES # BLD: 0.6 K/UL (ref 0.8–3.5)
LYMPHOCYTES # BLD: 0.8 K/UL (ref 0.8–3.5)
LYMPHOCYTES # BLD: 1.2 K/UL (ref 0.8–3.5)
LYMPHOCYTES # BLD: 1.3 K/UL (ref 0.8–3.5)
LYMPHOCYTES # BLD: 1.4 K/UL (ref 0.8–3.5)
LYMPHOCYTES # BLD: 1.6 K/UL (ref 0.8–3.5)
LYMPHOCYTES # BLD: 3.3 K/UL (ref 0.8–3.5)
LYMPHOCYTES NFR BLD: 1 % (ref 12–49)
LYMPHOCYTES NFR BLD: 1 % (ref 12–49)
LYMPHOCYTES NFR BLD: 14 % (ref 12–49)
LYMPHOCYTES NFR BLD: 17 % (ref 12–49)
LYMPHOCYTES NFR BLD: 2 % (ref 12–49)
LYMPHOCYTES NFR BLD: 20 % (ref 12–49)
LYMPHOCYTES NFR BLD: 22 % (ref 12–49)
LYMPHOCYTES NFR BLD: 3 % (ref 12–49)
LYMPHOCYTES NFR BLD: 3 % (ref 12–49)
LYMPHOCYTES NFR BLD: 30 % (ref 12–49)
LYMPHOCYTES NFR BLD: 5 % (ref 12–49)
LYMPHOCYTES NFR BLD: 7 % (ref 12–49)
MAGNESIUM SERPL-MCNC: 2.3 MG/DL (ref 1.6–2.4)
MAGNESIUM SERPL-MCNC: 2.8 MG/DL (ref 1.6–2.4)
MAGNESIUM SERPL-MCNC: 2.9 MG/DL (ref 1.6–2.4)
MCH RBC QN AUTO: 32.5 PG (ref 26–34)
MCH RBC QN AUTO: 32.7 PG (ref 26–34)
MCH RBC QN AUTO: 33 PG (ref 26–34)
MCH RBC QN AUTO: 33.1 PG (ref 26–34)
MCH RBC QN AUTO: 33.1 PG (ref 26–34)
MCH RBC QN AUTO: 33.2 PG (ref 26–34)
MCH RBC QN AUTO: 33.3 PG (ref 26–34)
MCH RBC QN AUTO: 33.3 PG (ref 26–34)
MCH RBC QN AUTO: 33.4 PG (ref 26–34)
MCH RBC QN AUTO: 33.5 PG (ref 26–34)
MCH RBC QN AUTO: 33.6 PG (ref 26–34)
MCHC RBC AUTO-ENTMCNC: 31.6 G/DL (ref 30–36.5)
MCHC RBC AUTO-ENTMCNC: 32.8 G/DL (ref 30–36.5)
MCHC RBC AUTO-ENTMCNC: 32.8 G/DL (ref 30–36.5)
MCHC RBC AUTO-ENTMCNC: 32.9 G/DL (ref 30–36.5)
MCHC RBC AUTO-ENTMCNC: 32.9 G/DL (ref 30–36.5)
MCHC RBC AUTO-ENTMCNC: 33.2 G/DL (ref 30–36.5)
MCHC RBC AUTO-ENTMCNC: 33.3 G/DL (ref 30–36.5)
MCHC RBC AUTO-ENTMCNC: 33.4 G/DL (ref 30–36.5)
MCHC RBC AUTO-ENTMCNC: 33.5 G/DL (ref 30–36.5)
MCHC RBC AUTO-ENTMCNC: 33.6 G/DL (ref 30–36.5)
MCHC RBC AUTO-ENTMCNC: 34 G/DL (ref 30–36.5)
MCV RBC AUTO: 100.5 FL (ref 80–99)
MCV RBC AUTO: 101 FL (ref 80–99)
MCV RBC AUTO: 101.1 FL (ref 80–99)
MCV RBC AUTO: 104.3 FL (ref 80–99)
MCV RBC AUTO: 97.2 FL (ref 80–99)
MCV RBC AUTO: 98 FL (ref 80–99)
MCV RBC AUTO: 98 FL (ref 80–99)
MCV RBC AUTO: 98.4 FL (ref 80–99)
MCV RBC AUTO: 98.9 FL (ref 80–99)
MCV RBC AUTO: 99 FL (ref 80–99)
MCV RBC AUTO: 99.2 FL (ref 80–99)
MCV RBC AUTO: 99.4 FL (ref 80–99)
MCV RBC AUTO: 99.5 FL (ref 80–99)
MCV RBC AUTO: 99.5 FL (ref 80–99)
MCV RBC AUTO: 99.7 FL (ref 80–99)
MONOCYTES # BLD: 0.2 K/UL (ref 0–1)
MONOCYTES # BLD: 0.3 K/UL (ref 0–1)
MONOCYTES # BLD: 0.4 K/UL (ref 0–1)
MONOCYTES # BLD: 0.4 K/UL (ref 0–1)
MONOCYTES # BLD: 0.5 K/UL (ref 0–1)
MONOCYTES # BLD: 0.6 K/UL (ref 0–1)
MONOCYTES # BLD: 0.6 K/UL (ref 0–1)
MONOCYTES # BLD: 0.8 K/UL (ref 0–1)
MONOCYTES # BLD: 0.8 K/UL (ref 0–1)
MONOCYTES # BLD: 0.9 K/UL (ref 0–1)
MONOCYTES # BLD: 1 K/UL (ref 0–1)
MONOCYTES # BLD: 1 K/UL (ref 0–1)
MONOCYTES # BLD: 1.1 K/UL (ref 0–1)
MONOCYTES NFR BLD: 10 % (ref 5–13)
MONOCYTES NFR BLD: 12 % (ref 5–13)
MONOCYTES NFR BLD: 15 % (ref 5–13)
MONOCYTES NFR BLD: 2 % (ref 5–13)
MONOCYTES NFR BLD: 3 % (ref 5–13)
MONOCYTES NFR BLD: 4 % (ref 5–13)
MONOCYTES NFR BLD: 4 % (ref 5–13)
MONOCYTES NFR BLD: 5 % (ref 5–13)
MONOCYTES NFR BLD: 8 % (ref 5–13)
MONOCYTES NFR BLD: 9 % (ref 5–13)
MUCOUS THREADS URNS QL MICRO: ABNORMAL /LPF
MUCOUS THREADS URNS QL MICRO: ABNORMAL /LPF
MV DEC SLOPE: 4390 MM/S2
MV DEC SLOPE: 4390 MM/S2
MYELOCYTES NFR BLD MANUAL: 1 %
NEUTS SEG # BLD: 14 K/UL (ref 1.8–8)
NEUTS SEG # BLD: 14 K/UL (ref 1.8–8)
NEUTS SEG # BLD: 14.1 K/UL (ref 1.8–8)
NEUTS SEG # BLD: 14.9 K/UL (ref 1.8–8)
NEUTS SEG # BLD: 15 K/UL (ref 1.8–8)
NEUTS SEG # BLD: 16.8 K/UL (ref 1.8–8)
NEUTS SEG # BLD: 17.9 K/UL (ref 1.8–8)
NEUTS SEG # BLD: 20.5 K/UL (ref 1.8–8)
NEUTS SEG # BLD: 24 K/UL (ref 1.8–8)
NEUTS SEG # BLD: 4.2 K/UL (ref 1.8–8)
NEUTS SEG # BLD: 4.2 K/UL (ref 1.8–8)
NEUTS SEG # BLD: 4.5 K/UL (ref 1.8–8)
NEUTS SEG # BLD: 5.5 K/UL (ref 1.8–8)
NEUTS SEG # BLD: 7.1 K/UL (ref 1.8–8)
NEUTS SEG # BLD: 7.3 K/UL (ref 1.8–8)
NEUTS SEG NFR BLD: 48 % (ref 32–75)
NEUTS SEG NFR BLD: 54 % (ref 32–75)
NEUTS SEG NFR BLD: 59 % (ref 32–75)
NEUTS SEG NFR BLD: 68 % (ref 32–75)
NEUTS SEG NFR BLD: 75 % (ref 32–75)
NEUTS SEG NFR BLD: 89 % (ref 32–75)
NEUTS SEG NFR BLD: 89 % (ref 32–75)
NEUTS SEG NFR BLD: 92 % (ref 32–75)
NEUTS SEG NFR BLD: 93 % (ref 32–75)
NEUTS SEG NFR BLD: 93 % (ref 32–75)
NEUTS SEG NFR BLD: 94 % (ref 32–75)
NEUTS SEG NFR BLD: 95 % (ref 32–75)
NEUTS SEG NFR BLD: 96 % (ref 32–75)
NITRITE UR QL STRIP.AUTO: NEGATIVE
NITRITE UR QL STRIP.AUTO: NEGATIVE
NITRITE UR QL STRIP.AUTO: POSITIVE
NRBC # BLD: 0 K/UL (ref 0–0.01)
NRBC # BLD: 0.02 K/UL (ref 0–0.01)
NRBC BLD-RTO: 0 PER 100 WBC
NRBC BLD-RTO: 0.1 PER 100 WBC
P-R INTERVAL, ECG05: 124 MS
P-R INTERVAL, ECG05: 128 MS
P-R INTERVAL, ECG05: 130 MS
P-R INTERVAL, ECG05: 142 MS
P-R INTERVAL, ECG05: 150 MS
P-R INTERVAL, ECG05: 168 MS
PCO2 BLDA: 29 MMHG (ref 35–45)
PCO2 BLDA: 30 MMHG (ref 35–45)
PCO2 BLDA: 30 MMHG (ref 35–45)
PCO2 BLDA: 31 MMHG (ref 35–45)
PCO2 BLDA: 33 MMHG (ref 35–45)
PERFORMED BY, TECHID: ABNORMAL
PERFORMED BY, TECHID: ABNORMAL
PH BLDA: 7.44 [PH] (ref 7.35–7.45)
PH BLDA: 7.45 [PH] (ref 7.35–7.45)
PH BLDA: 7.46 [PH] (ref 7.35–7.45)
PH BLDA: 7.46 [PH] (ref 7.35–7.45)
PH BLDA: 7.47 [PH] (ref 7.35–7.45)
PH BLDA: 7.48 [PH] (ref 7.35–7.45)
PH BLDA: 7.48 [PH] (ref 7.35–7.45)
PH UR STRIP: 5 [PH] (ref 5–8)
PHOSPHATE SERPL-MCNC: 3.2 MG/DL (ref 2.6–4.7)
PLATELET # BLD AUTO: 121 K/UL (ref 150–400)
PLATELET # BLD AUTO: 124 K/UL (ref 150–400)
PLATELET # BLD AUTO: 128 K/UL (ref 150–400)
PLATELET # BLD AUTO: 140 K/UL (ref 150–400)
PLATELET # BLD AUTO: 141 K/UL (ref 150–400)
PLATELET # BLD AUTO: 151 K/UL (ref 150–400)
PLATELET # BLD AUTO: 158 K/UL (ref 150–400)
PLATELET # BLD AUTO: 159 K/UL (ref 150–400)
PLATELET # BLD AUTO: 16 K/UL (ref 150–400)
PLATELET # BLD AUTO: 16 K/UL (ref 150–400)
PLATELET # BLD AUTO: 190 K/UL (ref 150–400)
PLATELET # BLD AUTO: 208 K/UL (ref 150–400)
PLATELET # BLD AUTO: 211 K/UL (ref 150–400)
PLATELET # BLD AUTO: 256 K/UL (ref 150–400)
PLATELET # BLD AUTO: 260 K/UL (ref 150–400)
PLATELET # BLD AUTO: 41 K/UL (ref 150–400)
PLATELET # BLD AUTO: 97 K/UL (ref 150–400)
PMV BLD AUTO: 10 FL (ref 8.9–12.9)
PMV BLD AUTO: 10.1 FL (ref 8.9–12.9)
PMV BLD AUTO: 10.4 FL (ref 8.9–12.9)
PMV BLD AUTO: 10.8 FL (ref 8.9–12.9)
PMV BLD AUTO: 11.1 FL (ref 8.9–12.9)
PMV BLD AUTO: 11.2 FL (ref 8.9–12.9)
PMV BLD AUTO: 11.2 FL (ref 8.9–12.9)
PMV BLD AUTO: 11.3 FL (ref 8.9–12.9)
PMV BLD AUTO: 11.4 FL (ref 8.9–12.9)
PMV BLD AUTO: 11.8 FL (ref 8.9–12.9)
PMV BLD AUTO: 12 FL (ref 8.9–12.9)
PMV BLD AUTO: 9.6 FL (ref 8.9–12.9)
PMV BLD AUTO: 9.7 FL (ref 8.9–12.9)
PMV BLD AUTO: 9.8 FL (ref 8.9–12.9)
PO2 BLDA: 110 MMHG (ref 75–100)
PO2 BLDA: 118 MMHG (ref 75–100)
PO2 BLDA: 55 MMHG (ref 75–100)
PO2 BLDA: 61 MMHG (ref 75–100)
PO2 BLDA: 71 MMHG (ref 75–100)
PO2 BLDA: 75 MMHG (ref 75–100)
PO2 BLDA: 82 MMHG (ref 75–100)
POTASSIUM SERPL-SCNC: 3.1 MMOL/L (ref 3.5–5.1)
POTASSIUM SERPL-SCNC: 3.2 MMOL/L (ref 3.5–5.1)
POTASSIUM SERPL-SCNC: 3.4 MMOL/L (ref 3.5–5.1)
POTASSIUM SERPL-SCNC: 3.5 MMOL/L (ref 3.5–5.1)
POTASSIUM SERPL-SCNC: 3.7 MMOL/L (ref 3.5–5.1)
POTASSIUM SERPL-SCNC: 3.9 MMOL/L (ref 3.5–5.1)
POTASSIUM SERPL-SCNC: 4.1 MMOL/L (ref 3.5–5.1)
POTASSIUM SERPL-SCNC: 4.1 MMOL/L (ref 3.5–5.1)
POTASSIUM SERPL-SCNC: 4.4 MMOL/L (ref 3.5–5.1)
POTASSIUM SERPL-SCNC: 4.5 MMOL/L (ref 3.5–5.1)
POTASSIUM SERPL-SCNC: 4.6 MMOL/L (ref 3.5–5.1)
POTASSIUM SERPL-SCNC: 4.6 MMOL/L (ref 3.5–5.1)
POTASSIUM SERPL-SCNC: 4.8 MMOL/L (ref 3.5–5.1)
PREALB SERPL-MCNC: 8.8 MG/DL (ref 20–40)
PROCALCITONIN SERPL-MCNC: 0.19 NG/ML
PROT SERPL-MCNC: 5.4 G/DL (ref 6.4–8.2)
PROT SERPL-MCNC: 6.3 G/DL (ref 6.4–8.2)
PROT SERPL-MCNC: 6.4 G/DL (ref 6.4–8.2)
PROT SERPL-MCNC: 6.6 G/DL (ref 6.4–8.2)
PROT SERPL-MCNC: 6.6 G/DL (ref 6.4–8.2)
PROT SERPL-MCNC: 6.8 G/DL (ref 6.4–8.2)
PROT SERPL-MCNC: 6.8 G/DL (ref 6.4–8.2)
PROT SERPL-MCNC: 6.9 G/DL (ref 6.4–8.2)
PROT SERPL-MCNC: 7.2 G/DL (ref 6.4–8.2)
PROT UR STRIP-MCNC: 100 MG/DL
PROT UR STRIP-MCNC: 30 MG/DL
PROT UR STRIP-MCNC: NEGATIVE MG/DL
PROTHROMBIN TIME: 13.7 SEC (ref 11.9–14.7)
PROTHROMBIN TIME: 13.9 SEC (ref 11.9–14.7)
PROTHROMBIN TIME: 14.1 SEC (ref 11.9–14.7)
Q-T INTERVAL, ECG07: 408 MS
Q-T INTERVAL, ECG07: 408 MS
Q-T INTERVAL, ECG07: 420 MS
Q-T INTERVAL, ECG07: 440 MS
Q-T INTERVAL, ECG07: 454 MS
Q-T INTERVAL, ECG07: 476 MS
QRS DURATION, ECG06: 100 MS
QRS DURATION, ECG06: 100 MS
QRS DURATION, ECG06: 102 MS
QRS DURATION, ECG06: 82 MS
QRS DURATION, ECG06: 86 MS
QRS DURATION, ECG06: 88 MS
QTC CALCULATION (BEZET), ECG08: 461 MS
QTC CALCULATION (BEZET), ECG08: 465 MS
QTC CALCULATION (BEZET), ECG08: 467 MS
QTC CALCULATION (BEZET), ECG08: 478 MS
QTC CALCULATION (BEZET), ECG08: 479 MS
QTC CALCULATION (BEZET), ECG08: 483 MS
RBC # BLD AUTO: 3.15 M/UL (ref 3.8–5.2)
RBC # BLD AUTO: 3.32 M/UL (ref 3.8–5.2)
RBC # BLD AUTO: 3.49 M/UL (ref 3.8–5.2)
RBC # BLD AUTO: 3.54 M/UL (ref 3.8–5.2)
RBC # BLD AUTO: 3.57 M/UL (ref 3.8–5.2)
RBC # BLD AUTO: 3.63 M/UL (ref 3.8–5.2)
RBC # BLD AUTO: 3.67 M/UL (ref 3.8–5.2)
RBC # BLD AUTO: 3.73 M/UL (ref 3.8–5.2)
RBC # BLD AUTO: 3.79 M/UL (ref 3.8–5.2)
RBC # BLD AUTO: 3.82 M/UL (ref 3.8–5.2)
RBC # BLD AUTO: 3.89 M/UL (ref 3.8–5.2)
RBC # BLD AUTO: 3.91 M/UL (ref 3.8–5.2)
RBC # BLD AUTO: 3.91 M/UL (ref 3.8–5.2)
RBC # BLD AUTO: 3.94 M/UL (ref 3.8–5.2)
RBC # BLD AUTO: 3.97 M/UL (ref 3.8–5.2)
RBC # BLD AUTO: 4.02 M/UL (ref 3.8–5.2)
RBC # BLD AUTO: 4.1 M/UL (ref 3.8–5.2)
RBC #/AREA URNS HPF: >100 /HPF (ref 0–5)
RBC #/AREA URNS HPF: ABNORMAL /HPF (ref 0–5)
RBC #/AREA URNS HPF: NORMAL /HPF (ref 0–5)
RBC MORPH BLD: ABNORMAL
REPORTED DOSE,DOSE: ABNORMAL UNITS
REPORTED DOSE,DOSE: ABNORMAL UNITS
REPORTED DOSE,DOSE: NORMAL UNITS
SAO2 % BLD: 91 %
SAO2 % BLD: 93 %
SAO2 % BLD: 95 %
SAO2 % BLD: 96 %
SAO2 % BLD: 97 %
SAO2 % BLD: 99 %
SAO2 % BLD: 99 %
SAO2% DEVICE SAO2% SENSOR NAME: ABNORMAL
SARS-COV-2, COV2: DETECTED
SARS-COV-2, COV2: NORMAL
SODIUM SERPL-SCNC: 140 MMOL/L (ref 136–145)
SODIUM SERPL-SCNC: 141 MMOL/L (ref 136–145)
SODIUM SERPL-SCNC: 142 MMOL/L (ref 136–145)
SODIUM SERPL-SCNC: 143 MMOL/L (ref 136–145)
SODIUM SERPL-SCNC: 144 MMOL/L (ref 136–145)
SODIUM SERPL-SCNC: 145 MMOL/L (ref 136–145)
SODIUM SERPL-SCNC: 148 MMOL/L (ref 136–145)
SP GR UR REFRACTOMETRY: 1.01 (ref 1–1.03)
SP GR UR REFRACTOMETRY: 1.02 (ref 1–1.03)
SP GR UR REFRACTOMETRY: 1.03 (ref 1–1.03)
SPECIAL REQUESTS,SREQ: ABNORMAL
SPECIAL REQUESTS,SREQ: ABNORMAL
SPECIMEN SOURCE: ABNORMAL
THERAPEUTIC RANGE,PTTT: NORMAL SEC (ref 68–109)
THERAPEUTIC RANGE,PTTT: NORMAL SEC (ref 68–109)
THERAPEUTIC RANGE,PTTT: NORMAL SEC (ref 82–109)
TRIGL SERPL-MCNC: 78 MG/DL (ref ?–150)
TROPONIN I SERPL-MCNC: <0.05 NG/ML
UA: UC IF INDICATED,UAUC: ABNORMAL
UA: UC IF INDICATED,UAUC: NORMAL
UROBILINOGEN UR QL STRIP.AUTO: 0.1 EU/DL (ref 0.1–1)
UROBILINOGEN UR QL STRIP.AUTO: 0.1 EU/DL (ref 0.1–1)
UROBILINOGEN UR QL STRIP.AUTO: 2 EU/DL (ref 0.1–1)
VANCOMYCIN SERPL-MCNC: 10.5 UG/ML
VANCOMYCIN TROUGH SERPL-MCNC: 1.8 UG/ML (ref 5–10)
VANCOMYCIN TROUGH SERPL-MCNC: 10.3 UG/ML (ref 5–10)
VENTILATION MODE VENT: ABNORMAL
VENTRICULAR RATE, ECG03: 62 BPM
VENTRICULAR RATE, ECG03: 66 BPM
VENTRICULAR RATE, ECG03: 67 BPM
VENTRICULAR RATE, ECG03: 78 BPM
VENTRICULAR RATE, ECG03: 78 BPM
VENTRICULAR RATE, ECG03: 79 BPM
VLDLC SERPL CALC-MCNC: 15.6 MG/DL
WBC # BLD AUTO: 11.1 K/UL (ref 3.6–11)
WBC # BLD AUTO: 15.2 K/UL (ref 3.6–11)
WBC # BLD AUTO: 15.2 K/UL (ref 3.6–11)
WBC # BLD AUTO: 15.6 K/UL (ref 3.6–11)
WBC # BLD AUTO: 16.1 K/UL (ref 3.6–11)
WBC # BLD AUTO: 16.2 K/UL (ref 3.6–11)
WBC # BLD AUTO: 16.7 K/UL (ref 3.6–11)
WBC # BLD AUTO: 17.6 K/UL (ref 3.6–11)
WBC # BLD AUTO: 18.9 K/UL (ref 3.6–11)
WBC # BLD AUTO: 22.4 K/UL (ref 3.6–11)
WBC # BLD AUTO: 25.4 K/UL (ref 3.6–11)
WBC # BLD AUTO: 6.3 K/UL (ref 3.6–11)
WBC # BLD AUTO: 7.2 K/UL (ref 3.6–11)
WBC # BLD AUTO: 7.6 K/UL (ref 3.6–11)
WBC # BLD AUTO: 7.8 K/UL (ref 3.6–11)
WBC # BLD AUTO: 8 K/UL (ref 3.6–11)
WBC # BLD AUTO: 9.7 K/UL (ref 3.6–11)
WBC URNS QL MICRO: >100 /HPF (ref 0–4)
WBC URNS QL MICRO: ABNORMAL /HPF (ref 0–4)
WBC URNS QL MICRO: NORMAL /HPF (ref 0–4)

## 2021-01-01 PROCEDURE — 92928 PRQ TCAT PLMT NTRAC ST 1 LES: CPT | Performed by: INTERNAL MEDICINE

## 2021-01-01 PROCEDURE — 74011250636 HC RX REV CODE- 250/636: Performed by: INTERNAL MEDICINE

## 2021-01-01 PROCEDURE — 82803 BLOOD GASES ANY COMBINATION: CPT

## 2021-01-01 PROCEDURE — 97161 PT EVAL LOW COMPLEX 20 MIN: CPT

## 2021-01-01 PROCEDURE — 74011000258 HC RX REV CODE- 258: Performed by: INTERNAL MEDICINE

## 2021-01-01 PROCEDURE — 74011250636 HC RX REV CODE- 250/636: Performed by: STUDENT IN AN ORGANIZED HEALTH CARE EDUCATION/TRAINING PROGRAM

## 2021-01-01 PROCEDURE — C1874 STENT, COATED/COV W/DEL SYS: HCPCS | Performed by: INTERNAL MEDICINE

## 2021-01-01 PROCEDURE — 65270000029 HC RM PRIVATE

## 2021-01-01 PROCEDURE — 74011250637 HC RX REV CODE- 250/637: Performed by: PHYSICIAN ASSISTANT

## 2021-01-01 PROCEDURE — 70450 CT HEAD/BRAIN W/O DYE: CPT

## 2021-01-01 PROCEDURE — 74011000250 HC RX REV CODE- 250: Performed by: HOSPITALIST

## 2021-01-01 PROCEDURE — 36415 COLL VENOUS BLD VENIPUNCTURE: CPT

## 2021-01-01 PROCEDURE — 71045 X-RAY EXAM CHEST 1 VIEW: CPT

## 2021-01-01 PROCEDURE — 85025 COMPLETE CBC W/AUTO DIFF WBC: CPT

## 2021-01-01 PROCEDURE — C1769 GUIDE WIRE: HCPCS | Performed by: INTERNAL MEDICINE

## 2021-01-01 PROCEDURE — 86140 C-REACTIVE PROTEIN: CPT

## 2021-01-01 PROCEDURE — 84484 ASSAY OF TROPONIN QUANT: CPT

## 2021-01-01 PROCEDURE — 74011000250 HC RX REV CODE- 250: Performed by: INTERNAL MEDICINE

## 2021-01-01 PROCEDURE — 74011250637 HC RX REV CODE- 250/637: Performed by: HOSPITALIST

## 2021-01-01 PROCEDURE — 93454 CORONARY ARTERY ANGIO S&I: CPT | Performed by: INTERNAL MEDICINE

## 2021-01-01 PROCEDURE — 99152 MOD SED SAME PHYS/QHP 5/>YRS: CPT | Performed by: INTERNAL MEDICINE

## 2021-01-01 PROCEDURE — 92610 EVALUATE SWALLOWING FUNCTION: CPT

## 2021-01-01 PROCEDURE — 97530 THERAPEUTIC ACTIVITIES: CPT

## 2021-01-01 PROCEDURE — 80048 BASIC METABOLIC PNL TOTAL CA: CPT

## 2021-01-01 PROCEDURE — 74011250636 HC RX REV CODE- 250/636: Performed by: HOSPITALIST

## 2021-01-01 PROCEDURE — 96375 TX/PRO/DX INJ NEW DRUG ADDON: CPT

## 2021-01-01 PROCEDURE — 80053 COMPREHEN METABOLIC PANEL: CPT

## 2021-01-01 PROCEDURE — 77010033711 HC HIGH FLOW OXYGEN

## 2021-01-01 PROCEDURE — 93005 ELECTROCARDIOGRAM TRACING: CPT

## 2021-01-01 PROCEDURE — 87040 BLOOD CULTURE FOR BACTERIA: CPT

## 2021-01-01 PROCEDURE — 74011250636 HC RX REV CODE- 250/636: Performed by: PHYSICIAN ASSISTANT

## 2021-01-01 PROCEDURE — 97110 THERAPEUTIC EXERCISES: CPT

## 2021-01-01 PROCEDURE — 83615 LACTATE (LD) (LDH) ENZYME: CPT

## 2021-01-01 PROCEDURE — 85379 FIBRIN DEGRADATION QUANT: CPT

## 2021-01-01 PROCEDURE — 99218 HC RM OBSERVATION: CPT

## 2021-01-01 PROCEDURE — 96360 HYDRATION IV INFUSION INIT: CPT

## 2021-01-01 PROCEDURE — 74011250637 HC RX REV CODE- 250/637: Performed by: STUDENT IN AN ORGANIZED HEALTH CARE EDUCATION/TRAINING PROGRAM

## 2021-01-01 PROCEDURE — 74011250636 HC RX REV CODE- 250/636: Performed by: EMERGENCY MEDICINE

## 2021-01-01 PROCEDURE — 65610000006 HC RM INTENSIVE CARE

## 2021-01-01 PROCEDURE — 74011000250 HC RX REV CODE- 250: Performed by: EMERGENCY MEDICINE

## 2021-01-01 PROCEDURE — 94762 N-INVAS EAR/PLS OXIMTRY CONT: CPT

## 2021-01-01 PROCEDURE — 84134 ASSAY OF PREALBUMIN: CPT

## 2021-01-01 PROCEDURE — 85730 THROMBOPLASTIN TIME PARTIAL: CPT

## 2021-01-01 PROCEDURE — 85027 COMPLETE CBC AUTOMATED: CPT

## 2021-01-01 PROCEDURE — 84100 ASSAY OF PHOSPHORUS: CPT

## 2021-01-01 PROCEDURE — 85384 FIBRINOGEN ACTIVITY: CPT

## 2021-01-01 PROCEDURE — 99153 MOD SED SAME PHYS/QHP EA: CPT | Performed by: INTERNAL MEDICINE

## 2021-01-01 PROCEDURE — 36600 WITHDRAWAL OF ARTERIAL BLOOD: CPT

## 2021-01-01 PROCEDURE — 96376 TX/PRO/DX INJ SAME DRUG ADON: CPT

## 2021-01-01 PROCEDURE — 83735 ASSAY OF MAGNESIUM: CPT

## 2021-01-01 PROCEDURE — 87635 SARS-COV-2 COVID-19 AMP PRB: CPT

## 2021-01-01 PROCEDURE — 74011250637 HC RX REV CODE- 250/637: Performed by: NURSE PRACTITIONER

## 2021-01-01 PROCEDURE — 85610 PROTHROMBIN TIME: CPT

## 2021-01-01 PROCEDURE — 82306 VITAMIN D 25 HYDROXY: CPT

## 2021-01-01 PROCEDURE — 77030012468 HC VLV BLEEDBK CNTRL ABBT -B: Performed by: INTERNAL MEDICINE

## 2021-01-01 PROCEDURE — 82728 ASSAY OF FERRITIN: CPT

## 2021-01-01 PROCEDURE — 77030003394 HC NDL ART COOK -A: Performed by: INTERNAL MEDICINE

## 2021-01-01 PROCEDURE — 77030042317 HC BND COMPR HEMSTAT -B: Performed by: INTERNAL MEDICINE

## 2021-01-01 PROCEDURE — 94660 CPAP INITIATION&MGMT: CPT

## 2021-01-01 PROCEDURE — C1894 INTRO/SHEATH, NON-LASER: HCPCS | Performed by: INTERNAL MEDICINE

## 2021-01-01 PROCEDURE — 97165 OT EVAL LOW COMPLEX 30 MIN: CPT

## 2021-01-01 PROCEDURE — 83880 ASSAY OF NATRIURETIC PEPTIDE: CPT

## 2021-01-01 PROCEDURE — 80076 HEPATIC FUNCTION PANEL: CPT

## 2021-01-01 PROCEDURE — 74011000636 HC RX REV CODE- 636: Performed by: HOSPITALIST

## 2021-01-01 PROCEDURE — 74011250636 HC RX REV CODE- 250/636: Performed by: NURSE PRACTITIONER

## 2021-01-01 PROCEDURE — 80202 ASSAY OF VANCOMYCIN: CPT

## 2021-01-01 PROCEDURE — 36573 INSJ PICC RS&I 5 YR+: CPT | Performed by: STUDENT IN AN ORGANIZED HEALTH CARE EDUCATION/TRAINING PROGRAM

## 2021-01-01 PROCEDURE — 70498 CT ANGIOGRAPHY NECK: CPT

## 2021-01-01 PROCEDURE — 81001 URINALYSIS AUTO W/SCOPE: CPT

## 2021-01-01 PROCEDURE — 87186 SC STD MICRODIL/AGAR DIL: CPT

## 2021-01-01 PROCEDURE — 87804 INFLUENZA ASSAY W/OPTIC: CPT

## 2021-01-01 PROCEDURE — 74011000636 HC RX REV CODE- 636: Performed by: INTERNAL MEDICINE

## 2021-01-01 PROCEDURE — 36573 INSJ PICC RS&I 5 YR+: CPT | Performed by: NURSE PRACTITIONER

## 2021-01-01 PROCEDURE — 99284 EMERGENCY DEPT VISIT MOD MDM: CPT

## 2021-01-01 PROCEDURE — 74011250636 HC RX REV CODE- 250/636: Performed by: FAMILY MEDICINE

## 2021-01-01 PROCEDURE — 77010033678 HC OXYGEN DAILY

## 2021-01-01 PROCEDURE — 93970 EXTREMITY STUDY: CPT

## 2021-01-01 PROCEDURE — 5A09457 ASSISTANCE WITH RESPIRATORY VENTILATION, 24-96 CONSECUTIVE HOURS, CONTINUOUS POSITIVE AIRWAY PRESSURE: ICD-10-PCS | Performed by: HOSPITALIST

## 2021-01-01 PROCEDURE — 74011636637 HC RX REV CODE- 636/637: Performed by: HOSPITALIST

## 2021-01-01 PROCEDURE — 82962 GLUCOSE BLOOD TEST: CPT

## 2021-01-01 PROCEDURE — 84145 PROCALCITONIN (PCT): CPT

## 2021-01-01 PROCEDURE — 87077 CULTURE AEROBIC IDENTIFY: CPT

## 2021-01-01 PROCEDURE — 74011000250 HC RX REV CODE- 250: Performed by: NURSE PRACTITIONER

## 2021-01-01 PROCEDURE — 83605 ASSAY OF LACTIC ACID: CPT

## 2021-01-01 PROCEDURE — 96374 THER/PROPH/DIAG INJ IV PUSH: CPT

## 2021-01-01 PROCEDURE — 76210000001 HC OR PH I REC 2.5 TO 3 HR: Performed by: INTERNAL MEDICINE

## 2021-01-01 PROCEDURE — 77030013715 HC INFL SYS MRTM -B: Performed by: INTERNAL MEDICINE

## 2021-01-01 PROCEDURE — C1725 CATH, TRANSLUMIN NON-LASER: HCPCS | Performed by: INTERNAL MEDICINE

## 2021-01-01 PROCEDURE — 93010 ELECTROCARDIOGRAM REPORT: CPT | Performed by: INTERNAL MEDICINE

## 2021-01-01 PROCEDURE — 85347 COAGULATION TIME ACTIVATED: CPT

## 2021-01-01 PROCEDURE — C1887 CATHETER, GUIDING: HCPCS | Performed by: INTERNAL MEDICINE

## 2021-01-01 PROCEDURE — 77030008542 HC TBNG MON PRSS EDWD -A: Performed by: INTERNAL MEDICINE

## 2021-01-01 PROCEDURE — 92526 ORAL FUNCTION THERAPY: CPT

## 2021-01-01 PROCEDURE — 93571 IV DOP VEL&/PRESS C FLO 1ST: CPT | Performed by: INTERNAL MEDICINE

## 2021-01-01 PROCEDURE — 93306 TTE W/DOPPLER COMPLETE: CPT

## 2021-01-01 PROCEDURE — 94760 N-INVAS EAR/PLS OXIMETRY 1: CPT

## 2021-01-01 PROCEDURE — 83036 HEMOGLOBIN GLYCOSYLATED A1C: CPT

## 2021-01-01 PROCEDURE — 80061 LIPID PANEL: CPT

## 2021-01-01 PROCEDURE — 96361 HYDRATE IV INFUSION ADD-ON: CPT

## 2021-01-01 PROCEDURE — 74011250637 HC RX REV CODE- 250/637: Performed by: EMERGENCY MEDICINE

## 2021-01-01 PROCEDURE — 97116 GAIT TRAINING THERAPY: CPT

## 2021-01-01 PROCEDURE — 74011250637 HC RX REV CODE- 250/637: Performed by: INTERNAL MEDICINE

## 2021-01-01 PROCEDURE — 99285 EMERGENCY DEPT VISIT HI MDM: CPT

## 2021-01-01 PROCEDURE — 77030016700 HC CATH ANGI DX INFN2 CARD -B: Performed by: INTERNAL MEDICINE

## 2021-01-01 PROCEDURE — 87086 URINE CULTURE/COLONY COUNT: CPT

## 2021-01-01 PROCEDURE — 2709999900 HC NON-CHARGEABLE SUPPLY: Performed by: INTERNAL MEDICINE

## 2021-01-01 PROCEDURE — XW033E5 INTRODUCTION OF REMDESIVIR ANTI-INFECTIVE INTO PERIPHERAL VEIN, PERCUTANEOUS APPROACH, NEW TECHNOLOGY GROUP 5: ICD-10-PCS | Performed by: INTERNAL MEDICINE

## 2021-01-01 PROCEDURE — 77030019698 HC SYR ANGI MDLON MRTM -A: Performed by: INTERNAL MEDICINE

## 2021-01-01 DEVICE — XIENCE SIERRA™ EVEROLIMUS ELUTING CORONARY STENT SYSTEM 3.00 MM X 23 MM / RAPID-EXCHANGE
Type: IMPLANTABLE DEVICE | Status: FUNCTIONAL
Brand: XIENCE SIERRA™

## 2021-01-01 RX ORDER — FUROSEMIDE 10 MG/ML
40 INJECTION INTRAMUSCULAR; INTRAVENOUS ONCE
Status: COMPLETED | OUTPATIENT
Start: 2021-01-01 | End: 2021-01-01

## 2021-01-01 RX ORDER — ASCORBIC ACID 500 MG
250 TABLET ORAL 2 TIMES DAILY
Status: DISCONTINUED | OUTPATIENT
Start: 2021-01-01 | End: 2021-01-01

## 2021-01-01 RX ORDER — ATORVASTATIN CALCIUM 20 MG/1
20 TABLET, FILM COATED ORAL
Status: DISCONTINUED | OUTPATIENT
Start: 2021-01-01 | End: 2021-01-01

## 2021-01-01 RX ORDER — NICARDIPINE HYDROCHLORIDE 2.5 MG/ML
INJECTION INTRAVENOUS AS NEEDED
Status: DISCONTINUED | OUTPATIENT
Start: 2021-01-01 | End: 2021-01-01 | Stop reason: HOSPADM

## 2021-01-01 RX ORDER — ATORVASTATIN CALCIUM 40 MG/1
40 TABLET, FILM COATED ORAL
Status: DISCONTINUED | OUTPATIENT
Start: 2021-01-01 | End: 2021-01-01 | Stop reason: HOSPADM

## 2021-01-01 RX ORDER — ACETAMINOPHEN 650 MG/1
650 SUPPOSITORY RECTAL
Status: DISCONTINUED | OUTPATIENT
Start: 2021-01-01 | End: 2021-01-01

## 2021-01-01 RX ORDER — SIMVASTATIN 40 MG/1
40 TABLET, FILM COATED ORAL
Qty: 30 TAB | Refills: 0 | Status: SHIPPED | OUTPATIENT
Start: 2021-01-01 | End: 2021-01-01

## 2021-01-01 RX ORDER — CETIRIZINE HCL 10 MG
5 TABLET ORAL
Status: DISCONTINUED | OUTPATIENT
Start: 2021-01-01 | End: 2021-01-01

## 2021-01-01 RX ORDER — ENOXAPARIN SODIUM 100 MG/ML
30 INJECTION SUBCUTANEOUS EVERY 24 HOURS
Status: DISCONTINUED | OUTPATIENT
Start: 2021-01-01 | End: 2021-01-01

## 2021-01-01 RX ORDER — ONDANSETRON 2 MG/ML
INJECTION INTRAMUSCULAR; INTRAVENOUS
Status: DISPENSED
Start: 2021-01-01 | End: 2021-01-01

## 2021-01-01 RX ORDER — AMOXICILLIN 250 MG
2 CAPSULE ORAL
Status: DISCONTINUED | OUTPATIENT
Start: 2021-01-01 | End: 2021-01-01 | Stop reason: HOSPADM

## 2021-01-01 RX ORDER — SODIUM CHLORIDE 9 MG/ML
100 INJECTION, SOLUTION INTRAVENOUS ONCE
Status: COMPLETED | OUTPATIENT
Start: 2021-01-01 | End: 2021-01-01

## 2021-01-01 RX ORDER — DEXAMETHASONE SODIUM PHOSPHATE 10 MG/ML
10 INJECTION INTRAMUSCULAR; INTRAVENOUS ONCE
Status: COMPLETED | OUTPATIENT
Start: 2021-01-01 | End: 2021-01-01

## 2021-01-01 RX ORDER — CLOPIDOGREL BISULFATE 75 MG/1
75 TABLET ORAL DAILY
COMMUNITY

## 2021-01-01 RX ORDER — DEXAMETHASONE SODIUM PHOSPHATE 4 MG/ML
4 INJECTION, SOLUTION INTRA-ARTICULAR; INTRALESIONAL; INTRAMUSCULAR; INTRAVENOUS; SOFT TISSUE EVERY 8 HOURS
Status: DISCONTINUED | OUTPATIENT
Start: 2021-01-01 | End: 2021-01-01

## 2021-01-01 RX ORDER — LEVOTHYROXINE SODIUM 88 UG/1
88 TABLET ORAL
Status: DISCONTINUED | OUTPATIENT
Start: 2021-01-01 | End: 2021-01-01 | Stop reason: HOSPADM

## 2021-01-01 RX ORDER — ACETAMINOPHEN 325 MG/1
650 TABLET ORAL
Status: DISCONTINUED | OUTPATIENT
Start: 2021-01-01 | End: 2021-01-01

## 2021-01-01 RX ORDER — SODIUM CHLORIDE 0.9 % (FLUSH) 0.9 %
5-40 SYRINGE (ML) INJECTION EVERY 8 HOURS
Status: DISCONTINUED | OUTPATIENT
Start: 2021-01-01 | End: 2021-01-01

## 2021-01-01 RX ORDER — ASPIRIN 81 MG/1
81 TABLET ORAL DAILY
Status: DISCONTINUED | OUTPATIENT
Start: 2021-01-01 | End: 2021-01-01 | Stop reason: HOSPADM

## 2021-01-01 RX ORDER — SIMVASTATIN 40 MG/1
40 TABLET, FILM COATED ORAL
COMMUNITY

## 2021-01-01 RX ORDER — AMLODIPINE BESYLATE 5 MG/1
5 TABLET ORAL DAILY
Status: DISCONTINUED | OUTPATIENT
Start: 2021-01-01 | End: 2021-01-01

## 2021-01-01 RX ORDER — MIRTAZAPINE 15 MG/1
15 TABLET, FILM COATED ORAL
Status: DISCONTINUED | OUTPATIENT
Start: 2021-01-01 | End: 2021-01-01

## 2021-01-01 RX ORDER — FUROSEMIDE 40 MG/1
40 TABLET ORAL DAILY
Status: DISCONTINUED | OUTPATIENT
Start: 2021-01-01 | End: 2021-01-01 | Stop reason: HOSPADM

## 2021-01-01 RX ORDER — DEXAMETHASONE 4 MG/1
6 TABLET ORAL DAILY
Status: DISCONTINUED | OUTPATIENT
Start: 2021-01-01 | End: 2021-01-01

## 2021-01-01 RX ORDER — SODIUM CHLORIDE 0.9 % (FLUSH) 0.9 %
5-40 SYRINGE (ML) INJECTION EVERY 8 HOURS
Status: DISCONTINUED | OUTPATIENT
Start: 2021-01-01 | End: 2021-01-01 | Stop reason: HOSPADM

## 2021-01-01 RX ORDER — LIDOCAINE HYDROCHLORIDE 10 MG/ML
INJECTION INFILTRATION; PERINEURAL AS NEEDED
Status: DISCONTINUED | OUTPATIENT
Start: 2021-01-01 | End: 2021-01-01 | Stop reason: HOSPADM

## 2021-01-01 RX ORDER — FUROSEMIDE 40 MG/1
20 TABLET ORAL DAILY
COMMUNITY

## 2021-01-01 RX ORDER — ONDANSETRON 2 MG/ML
4 INJECTION INTRAMUSCULAR; INTRAVENOUS ONCE
Status: COMPLETED | OUTPATIENT
Start: 2021-01-01 | End: 2021-01-01

## 2021-01-01 RX ORDER — ERGOCALCIFEROL 1.25 MG/1
50000 CAPSULE ORAL
Status: DISCONTINUED | OUTPATIENT
Start: 2021-01-01 | End: 2021-01-01 | Stop reason: HOSPADM

## 2021-01-01 RX ORDER — CLOPIDOGREL BISULFATE 75 MG/1
75 TABLET ORAL DAILY
Qty: 30 TAB | Refills: 0 | Status: SHIPPED | OUTPATIENT
Start: 2021-01-01 | End: 2021-01-01

## 2021-01-01 RX ORDER — MELATONIN
2000 DAILY
Status: DISCONTINUED | OUTPATIENT
Start: 2021-01-01 | End: 2021-01-01

## 2021-01-01 RX ORDER — LORATADINE 10 MG/1
10 TABLET ORAL DAILY
Status: DISCONTINUED | OUTPATIENT
Start: 2021-01-01 | End: 2021-01-01 | Stop reason: HOSPADM

## 2021-01-01 RX ORDER — DEXTROSE MONOHYDRATE AND SODIUM CHLORIDE 5; .45 G/100ML; G/100ML
50 INJECTION, SOLUTION INTRAVENOUS CONTINUOUS
Status: DISPENSED | OUTPATIENT
Start: 2021-01-01 | End: 2021-01-01

## 2021-01-01 RX ORDER — ASPIRIN 81 MG/1
81 TABLET ORAL DAILY
Status: DISCONTINUED | OUTPATIENT
Start: 2021-01-01 | End: 2021-01-01

## 2021-01-01 RX ORDER — POLYETHYLENE GLYCOL 3350 17 G/17G
17 POWDER, FOR SOLUTION ORAL DAILY
Status: DISCONTINUED | OUTPATIENT
Start: 2021-01-01 | End: 2021-01-01 | Stop reason: HOSPADM

## 2021-01-01 RX ORDER — ZINC SULFATE 50(220)MG
1 CAPSULE ORAL DAILY
Status: DISCONTINUED | OUTPATIENT
Start: 2021-01-01 | End: 2021-01-01

## 2021-01-01 RX ORDER — CHOLECALCIFEROL (VITAMIN D3) 125 MCG
10 CAPSULE ORAL
COMMUNITY

## 2021-01-01 RX ORDER — ATORVASTATIN CALCIUM 40 MG/1
40 TABLET, FILM COATED ORAL
Qty: 30 TAB | Refills: 0 | Status: SHIPPED | OUTPATIENT
Start: 2021-01-01 | End: 2021-01-01

## 2021-01-01 RX ORDER — CLOPIDOGREL BISULFATE 75 MG/1
TABLET ORAL AS NEEDED
Status: DISCONTINUED | OUTPATIENT
Start: 2021-01-01 | End: 2021-01-01 | Stop reason: HOSPADM

## 2021-01-01 RX ORDER — MORPHINE SULFATE 2 MG/ML
2 INJECTION, SOLUTION INTRAMUSCULAR; INTRAVENOUS
Status: DISCONTINUED | OUTPATIENT
Start: 2021-01-01 | End: 2021-12-31 | Stop reason: HOSPADM

## 2021-01-01 RX ORDER — MIRTAZAPINE 15 MG/1
15 TABLET, FILM COATED ORAL
COMMUNITY

## 2021-01-01 RX ORDER — HYDROGEN PEROXIDE 3 %
40 SOLUTION, NON-ORAL MISCELLANEOUS DAILY
COMMUNITY

## 2021-01-01 RX ORDER — LORAZEPAM 2 MG/ML
2 INJECTION INTRAMUSCULAR ONCE
Status: COMPLETED | OUTPATIENT
Start: 2021-01-01 | End: 2021-01-01

## 2021-01-01 RX ORDER — GUAIFENESIN 100 MG/5ML
81 LIQUID (ML) ORAL DAILY
Status: DISCONTINUED | OUTPATIENT
Start: 2021-01-01 | End: 2021-01-01

## 2021-01-01 RX ORDER — ACETAMINOPHEN 325 MG/1
650 TABLET ORAL
Status: DISCONTINUED | OUTPATIENT
Start: 2021-01-01 | End: 2021-01-01 | Stop reason: HOSPADM

## 2021-01-01 RX ORDER — HEPARIN SODIUM 200 [USP'U]/100ML
INJECTION, SOLUTION INTRAVENOUS
Status: COMPLETED | OUTPATIENT
Start: 2021-01-01 | End: 2021-01-01

## 2021-01-01 RX ORDER — GUAIFENESIN 100 MG/5ML
100 SOLUTION ORAL ONCE
Status: DISCONTINUED | OUTPATIENT
Start: 2021-01-01 | End: 2021-01-01

## 2021-01-01 RX ORDER — ONDANSETRON 4 MG/1
4 TABLET, ORALLY DISINTEGRATING ORAL
Status: DISCONTINUED | OUTPATIENT
Start: 2021-01-01 | End: 2021-01-01

## 2021-01-01 RX ORDER — LABETALOL HYDROCHLORIDE 5 MG/ML
10 INJECTION, SOLUTION INTRAVENOUS
Status: COMPLETED | OUTPATIENT
Start: 2021-01-01 | End: 2021-01-01

## 2021-01-01 RX ORDER — SODIUM CHLORIDE 0.9 % (FLUSH) 0.9 %
5-40 SYRINGE (ML) INJECTION AS NEEDED
Status: DISCONTINUED | OUTPATIENT
Start: 2021-01-01 | End: 2021-01-01

## 2021-01-01 RX ORDER — PANTOPRAZOLE SODIUM 40 MG/1
40 TABLET, DELAYED RELEASE ORAL
Status: DISCONTINUED | OUTPATIENT
Start: 2021-01-01 | End: 2021-01-01 | Stop reason: HOSPADM

## 2021-01-01 RX ORDER — CLOPIDOGREL BISULFATE 75 MG/1
75 TABLET ORAL DAILY
Status: DISCONTINUED | OUTPATIENT
Start: 2021-01-01 | End: 2021-01-01

## 2021-01-01 RX ORDER — FUROSEMIDE 40 MG/1
40 TABLET ORAL DAILY
Status: DISCONTINUED | OUTPATIENT
Start: 2021-01-01 | End: 2021-01-01

## 2021-01-01 RX ORDER — FENTANYL CITRATE 50 UG/ML
INJECTION, SOLUTION INTRAMUSCULAR; INTRAVENOUS AS NEEDED
Status: DISCONTINUED | OUTPATIENT
Start: 2021-01-01 | End: 2021-01-01 | Stop reason: HOSPADM

## 2021-01-01 RX ORDER — BENZONATATE 100 MG/1
200 CAPSULE ORAL 3 TIMES DAILY
Status: DISCONTINUED | OUTPATIENT
Start: 2021-01-01 | End: 2021-01-01

## 2021-01-01 RX ORDER — HYDRALAZINE HYDROCHLORIDE 20 MG/ML
20 INJECTION INTRAMUSCULAR; INTRAVENOUS
Status: DISCONTINUED | OUTPATIENT
Start: 2021-01-01 | End: 2021-01-01

## 2021-01-01 RX ORDER — LEVOTHYROXINE SODIUM 75 UG/1
75 TABLET ORAL
Status: DISCONTINUED | OUTPATIENT
Start: 2021-01-01 | End: 2021-01-01

## 2021-01-01 RX ORDER — FACIAL-BODY WIPES
10 EACH TOPICAL DAILY PRN
Status: DISCONTINUED | OUTPATIENT
Start: 2021-01-01 | End: 2021-01-01 | Stop reason: HOSPADM

## 2021-01-01 RX ORDER — ACETAMINOPHEN 325 MG/1
650 TABLET ORAL
Qty: 180 TAB | Refills: 0 | Status: SHIPPED | OUTPATIENT
Start: 2021-01-01 | End: 2021-01-01

## 2021-01-01 RX ORDER — HYDROXYZINE PAMOATE 25 MG/1
25 CAPSULE ORAL
Status: DISCONTINUED | OUTPATIENT
Start: 2021-01-01 | End: 2021-01-01 | Stop reason: HOSPADM

## 2021-01-01 RX ORDER — CLOPIDOGREL BISULFATE 75 MG/1
75 TABLET ORAL DAILY
Status: DISCONTINUED | OUTPATIENT
Start: 2021-01-01 | End: 2021-01-01 | Stop reason: HOSPADM

## 2021-01-01 RX ORDER — MIRTAZAPINE 15 MG/1
15 TABLET, FILM COATED ORAL
Status: DISCONTINUED | OUTPATIENT
Start: 2021-01-01 | End: 2021-01-01 | Stop reason: HOSPADM

## 2021-01-01 RX ORDER — LOSARTAN POTASSIUM 50 MG/1
100 TABLET ORAL DAILY
Status: DISCONTINUED | OUTPATIENT
Start: 2021-01-01 | End: 2021-01-01 | Stop reason: HOSPADM

## 2021-01-01 RX ORDER — ONDANSETRON 2 MG/ML
4 INJECTION INTRAMUSCULAR; INTRAVENOUS
Status: DISCONTINUED | OUTPATIENT
Start: 2021-01-01 | End: 2021-01-01 | Stop reason: HOSPADM

## 2021-01-01 RX ORDER — SODIUM CHLORIDE 0.9 % (FLUSH) 0.9 %
5-40 SYRINGE (ML) INJECTION AS NEEDED
Status: DISCONTINUED | OUTPATIENT
Start: 2021-01-01 | End: 2021-01-01 | Stop reason: HOSPADM

## 2021-01-01 RX ORDER — SODIUM CHLORIDE 9 MG/ML
50 INJECTION, SOLUTION INTRAVENOUS CONTINUOUS
Status: DISCONTINUED | OUTPATIENT
Start: 2021-01-01 | End: 2021-01-01 | Stop reason: HOSPADM

## 2021-01-01 RX ORDER — SIMVASTATIN 10 MG/1
40 TABLET, FILM COATED ORAL
Status: DISCONTINUED | OUTPATIENT
Start: 2021-01-01 | End: 2021-01-01 | Stop reason: HOSPADM

## 2021-01-01 RX ORDER — LANOLIN ALCOHOL/MO/W.PET/CERES
3 CREAM (GRAM) TOPICAL
Status: DISCONTINUED | OUTPATIENT
Start: 2021-01-01 | End: 2021-01-01 | Stop reason: HOSPADM

## 2021-01-01 RX ORDER — CODEINE PHOSPHATE AND GUAIFENESIN 10; 100 MG/5ML; MG/5ML
5 SOLUTION ORAL
Status: DISCONTINUED | OUTPATIENT
Start: 2021-01-01 | End: 2021-01-01

## 2021-01-01 RX ORDER — CHOLECALCIFEROL (VITAMIN D3) 125 MCG
10 CAPSULE ORAL
Status: DISCONTINUED | OUTPATIENT
Start: 2021-01-01 | End: 2021-01-01

## 2021-01-01 RX ORDER — DEXAMETHASONE SODIUM PHOSPHATE 4 MG/ML
6 INJECTION, SOLUTION INTRA-ARTICULAR; INTRALESIONAL; INTRAMUSCULAR; INTRAVENOUS; SOFT TISSUE EVERY 8 HOURS
Status: DISCONTINUED | OUTPATIENT
Start: 2021-01-01 | End: 2021-01-01

## 2021-01-01 RX ORDER — PANTOPRAZOLE SODIUM 40 MG/1
40 TABLET, DELAYED RELEASE ORAL
Status: DISCONTINUED | OUTPATIENT
Start: 2021-01-01 | End: 2021-01-01

## 2021-01-01 RX ORDER — CHOLECALCIFEROL (VITAMIN D3) 125 MCG
10 CAPSULE ORAL
Status: DISCONTINUED | OUTPATIENT
Start: 2021-01-01 | End: 2021-01-01 | Stop reason: HOSPADM

## 2021-01-01 RX ORDER — LEVOTHYROXINE SODIUM 75 UG/1
75 TABLET ORAL
COMMUNITY

## 2021-01-01 RX ORDER — AZITHROMYCIN 250 MG/1
500 TABLET, FILM COATED ORAL
Status: COMPLETED | OUTPATIENT
Start: 2021-01-01 | End: 2021-01-01

## 2021-01-01 RX ORDER — ONDANSETRON 2 MG/ML
4 INJECTION INTRAMUSCULAR; INTRAVENOUS
Status: DISCONTINUED | OUTPATIENT
Start: 2021-01-01 | End: 2021-01-01

## 2021-01-01 RX ORDER — NOREPINEPHRINE BIT/0.9 % NACL 8 MG/250ML
.5-16 INFUSION BOTTLE (ML) INTRAVENOUS
Status: DISCONTINUED | OUTPATIENT
Start: 2021-01-01 | End: 2021-01-01

## 2021-01-01 RX ORDER — FUROSEMIDE 40 MG/1
40 TABLET ORAL DAILY
Qty: 30 TAB | Refills: 0 | Status: SHIPPED | OUTPATIENT
Start: 2021-01-01 | End: 2021-01-01

## 2021-01-01 RX ORDER — SODIUM CHLORIDE 9 MG/ML
50 INJECTION, SOLUTION INTRAVENOUS CONTINUOUS
Status: DISCONTINUED | OUTPATIENT
Start: 2021-01-01 | End: 2021-01-01

## 2021-01-01 RX ORDER — HYDRALAZINE HYDROCHLORIDE 10 MG/1
10 TABLET, FILM COATED ORAL
Status: DISCONTINUED | OUTPATIENT
Start: 2021-01-01 | End: 2021-01-01 | Stop reason: HOSPADM

## 2021-01-01 RX ORDER — ERGOCALCIFEROL 1.25 MG/1
50000 CAPSULE ORAL
Status: ON HOLD | COMMUNITY
End: 2021-01-01

## 2021-01-01 RX ORDER — HEPARIN SODIUM 1000 [USP'U]/ML
INJECTION, SOLUTION INTRAVENOUS; SUBCUTANEOUS AS NEEDED
Status: DISCONTINUED | OUTPATIENT
Start: 2021-01-01 | End: 2021-01-01 | Stop reason: HOSPADM

## 2021-01-01 RX ORDER — POTASSIUM CHLORIDE 750 MG/1
40 TABLET, FILM COATED, EXTENDED RELEASE ORAL 2 TIMES DAILY
Status: DISCONTINUED | OUTPATIENT
Start: 2021-01-01 | End: 2021-01-01 | Stop reason: HOSPADM

## 2021-01-01 RX ORDER — PANTOPRAZOLE SODIUM 40 MG/1
40 GRANULE, DELAYED RELEASE ORAL
Status: DISCONTINUED | OUTPATIENT
Start: 2021-01-01 | End: 2021-01-01

## 2021-01-01 RX ORDER — LABETALOL HCL 20 MG/4 ML
20 SYRINGE (ML) INTRAVENOUS ONCE
Status: COMPLETED | OUTPATIENT
Start: 2021-01-01 | End: 2021-01-01

## 2021-01-01 RX ORDER — LORAZEPAM 2 MG/ML
1 INJECTION INTRAMUSCULAR
Status: DISCONTINUED | OUTPATIENT
Start: 2021-01-01 | End: 2021-12-31 | Stop reason: HOSPADM

## 2021-01-01 RX ADMIN — GUAIFENESIN AND CODEINE PHOSPHATE 5 ML: 10; 100 LIQUID ORAL at 21:38

## 2021-01-01 RX ADMIN — PANTOPRAZOLE SODIUM 40 MG: 40 TABLET, DELAYED RELEASE ORAL at 08:31

## 2021-01-01 RX ADMIN — REMDESIVIR 100 MG: 100 INJECTION, POWDER, LYOPHILIZED, FOR SOLUTION INTRAVENOUS at 17:27

## 2021-01-01 RX ADMIN — TRACE ELEMENTS INJECTION 4: 7.4; .75; 98; 151 INJECTION, SOLUTION INTRAVENOUS at 21:36

## 2021-01-01 RX ADMIN — LORATADINE 10 MG: 10 TABLET ORAL at 09:31

## 2021-01-01 RX ADMIN — BENZONATATE 200 MG: 100 CAPSULE ORAL at 22:55

## 2021-01-01 RX ADMIN — REMDESIVIR 100 MG: 100 INJECTION, POWDER, LYOPHILIZED, FOR SOLUTION INTRAVENOUS at 15:43

## 2021-01-01 RX ADMIN — CLOPIDOGREL BISULFATE 75 MG: 75 TABLET ORAL at 08:18

## 2021-01-01 RX ADMIN — DEXAMETHASONE SODIUM PHOSPHATE 6 MG: 4 INJECTION, SOLUTION INTRA-ARTICULAR; INTRALESIONAL; INTRAMUSCULAR; INTRAVENOUS; SOFT TISSUE at 22:33

## 2021-01-01 RX ADMIN — ASPIRIN 81 MG: 81 TABLET, COATED ORAL at 09:06

## 2021-01-01 RX ADMIN — ATORVASTATIN CALCIUM 40 MG: 40 TABLET, FILM COATED ORAL at 20:17

## 2021-01-01 RX ADMIN — ENOXAPARIN SODIUM 30 MG: 100 INJECTION SUBCUTANEOUS at 05:45

## 2021-01-01 RX ADMIN — MELATONIN TAB 5 MG 10 MG: 5 TAB at 21:26

## 2021-01-01 RX ADMIN — WHITE PETROLATUM,ZINC OXIDE: 57; 17 PASTE TOPICAL at 17:03

## 2021-01-01 RX ADMIN — PANTOPRAZOLE SODIUM 40 MG: 40 TABLET, DELAYED RELEASE ORAL at 10:56

## 2021-01-01 RX ADMIN — Medication 10 ML: at 05:06

## 2021-01-01 RX ADMIN — Medication 1 CAPSULE: at 09:34

## 2021-01-01 RX ADMIN — CLOPIDOGREL BISULFATE 75 MG: 75 TABLET ORAL at 13:51

## 2021-01-01 RX ADMIN — ENOXAPARIN SODIUM 30 MG: 100 INJECTION SUBCUTANEOUS at 07:01

## 2021-01-01 RX ADMIN — DEXAMETHASONE SODIUM PHOSPHATE 6 MG: 4 INJECTION, SOLUTION INTRA-ARTICULAR; INTRALESIONAL; INTRAMUSCULAR; INTRAVENOUS; SOFT TISSUE at 21:38

## 2021-01-01 RX ADMIN — Medication 10 ML: at 17:35

## 2021-01-01 RX ADMIN — ONDANSETRON 4 MG: 2 INJECTION INTRAMUSCULAR; INTRAVENOUS at 08:44

## 2021-01-01 RX ADMIN — BENZONATATE 200 MG: 100 CAPSULE ORAL at 09:31

## 2021-01-01 RX ADMIN — FUROSEMIDE 40 MG: 10 INJECTION INTRAMUSCULAR; INTRAVENOUS at 16:08

## 2021-01-01 RX ADMIN — DEXAMETHASONE SODIUM PHOSPHATE 6 MG: 4 INJECTION, SOLUTION INTRA-ARTICULAR; INTRALESIONAL; INTRAMUSCULAR; INTRAVENOUS; SOFT TISSUE at 05:05

## 2021-01-01 RX ADMIN — LEVOTHYROXINE SODIUM 75 MCG: 0.07 TABLET ORAL at 13:51

## 2021-01-01 RX ADMIN — PIPERACILLIN AND TAZOBACTAM 3.38 G: 3; .375 INJECTION, POWDER, LYOPHILIZED, FOR SOLUTION INTRAVENOUS at 16:08

## 2021-01-01 RX ADMIN — AMLODIPINE BESYLATE 5 MG: 5 TABLET ORAL at 13:04

## 2021-01-01 RX ADMIN — DOCUSATE SODIUM 50 MG AND SENNOSIDES 8.6 MG 2 TABLET: 8.6; 5 TABLET, FILM COATED ORAL at 20:17

## 2021-01-01 RX ADMIN — WHITE PETROLATUM,ZINC OXIDE: 57; 17 PASTE TOPICAL at 22:00

## 2021-01-01 RX ADMIN — GUAIFENESIN AND CODEINE PHOSPHATE 5 ML: 10; 100 LIQUID ORAL at 07:23

## 2021-01-01 RX ADMIN — OXYCODONE HYDROCHLORIDE AND ACETAMINOPHEN 250 MG: 500 TABLET ORAL at 13:50

## 2021-01-01 RX ADMIN — Medication 2000 UNITS: at 09:34

## 2021-01-01 RX ADMIN — DEXAMETHASONE SODIUM PHOSPHATE 6 MG: 4 INJECTION, SOLUTION INTRA-ARTICULAR; INTRALESIONAL; INTRAMUSCULAR; INTRAVENOUS; SOFT TISSUE at 22:56

## 2021-01-01 RX ADMIN — PANTOPRAZOLE SODIUM 40 MG: 40 TABLET, DELAYED RELEASE ORAL at 09:05

## 2021-01-01 RX ADMIN — Medication 10 ML: at 16:11

## 2021-01-01 RX ADMIN — Medication 2000 UNITS: at 09:05

## 2021-01-01 RX ADMIN — BENZONATATE 200 MG: 100 CAPSULE ORAL at 17:27

## 2021-01-01 RX ADMIN — I.V. FAT EMULSION 250 ML: 20 EMULSION INTRAVENOUS at 22:57

## 2021-01-01 RX ADMIN — DEXAMETHASONE SODIUM PHOSPHATE 4 MG: 4 INJECTION, SOLUTION INTRA-ARTICULAR; INTRALESIONAL; INTRAMUSCULAR; INTRAVENOUS; SOFT TISSUE at 10:57

## 2021-01-01 RX ADMIN — BENZONATATE 200 MG: 100 CAPSULE ORAL at 13:03

## 2021-01-01 RX ADMIN — DEXAMETHASONE SODIUM PHOSPHATE 4 MG: 4 INJECTION, SOLUTION INTRA-ARTICULAR; INTRALESIONAL; INTRAMUSCULAR; INTRAVENOUS; SOFT TISSUE at 05:02

## 2021-01-01 RX ADMIN — WHITE PETROLATUM,ZINC OXIDE: 57; 17 PASTE TOPICAL at 21:39

## 2021-01-01 RX ADMIN — OXYCODONE HYDROCHLORIDE AND ACETAMINOPHEN 250 MG: 500 TABLET ORAL at 22:33

## 2021-01-01 RX ADMIN — LEVOTHYROXINE SODIUM 88 MCG: 88 TABLET ORAL at 10:15

## 2021-01-01 RX ADMIN — Medication 10 ML: at 13:18

## 2021-01-01 RX ADMIN — PIPERACILLIN AND TAZOBACTAM 3.38 G: 3; .375 INJECTION, POWDER, LYOPHILIZED, FOR SOLUTION INTRAVENOUS at 09:31

## 2021-01-01 RX ADMIN — VANCOMYCIN HYDROCHLORIDE 750 MG: 750 INJECTION, POWDER, LYOPHILIZED, FOR SOLUTION INTRAVENOUS at 12:32

## 2021-01-01 RX ADMIN — BENZONATATE 200 MG: 100 CAPSULE ORAL at 22:47

## 2021-01-01 RX ADMIN — WHITE PETROLATUM,ZINC OXIDE: 57; 17 PASTE TOPICAL at 10:59

## 2021-01-01 RX ADMIN — WHITE PETROLATUM,ZINC OXIDE: 57; 17 PASTE TOPICAL at 13:53

## 2021-01-01 RX ADMIN — Medication 10 ML: at 13:53

## 2021-01-01 RX ADMIN — DEXAMETHASONE SODIUM PHOSPHATE 4 MG: 4 INJECTION, SOLUTION INTRA-ARTICULAR; INTRALESIONAL; INTRAMUSCULAR; INTRAVENOUS; SOFT TISSUE at 22:14

## 2021-01-01 RX ADMIN — DEXAMETHASONE SODIUM PHOSPHATE 6 MG: 4 INJECTION, SOLUTION INTRA-ARTICULAR; INTRALESIONAL; INTRAMUSCULAR; INTRAVENOUS; SOFT TISSUE at 05:32

## 2021-01-01 RX ADMIN — GUAIFENESIN AND CODEINE PHOSPHATE 5 ML: 10; 100 LIQUID ORAL at 09:36

## 2021-01-01 RX ADMIN — OXYCODONE HYDROCHLORIDE AND ACETAMINOPHEN 250 MG: 500 TABLET ORAL at 22:54

## 2021-01-01 RX ADMIN — AZITHROMYCIN MONOHYDRATE 500 MG: 500 INJECTION, POWDER, LYOPHILIZED, FOR SOLUTION INTRAVENOUS at 21:27

## 2021-01-01 RX ADMIN — DEXAMETHASONE SODIUM PHOSPHATE 4 MG: 4 INJECTION, SOLUTION INTRA-ARTICULAR; INTRALESIONAL; INTRAMUSCULAR; INTRAVENOUS; SOFT TISSUE at 15:12

## 2021-01-01 RX ADMIN — Medication 1 CAPSULE: at 08:18

## 2021-01-01 RX ADMIN — ASPIRIN 81 MG: 81 TABLET, COATED ORAL at 08:29

## 2021-01-01 RX ADMIN — AMLODIPINE BESYLATE 5 MG: 5 TABLET ORAL at 08:30

## 2021-01-01 RX ADMIN — ENOXAPARIN SODIUM 30 MG: 100 INJECTION SUBCUTANEOUS at 10:57

## 2021-01-01 RX ADMIN — ASPIRIN 81 MG: 81 TABLET, COATED ORAL at 09:31

## 2021-01-01 RX ADMIN — GUAIFENESIN AND CODEINE PHOSPHATE 5 ML: 10; 100 LIQUID ORAL at 13:04

## 2021-01-01 RX ADMIN — DEXAMETHASONE SODIUM PHOSPHATE 6 MG: 4 INJECTION, SOLUTION INTRA-ARTICULAR; INTRALESIONAL; INTRAMUSCULAR; INTRAVENOUS; SOFT TISSUE at 13:49

## 2021-01-01 RX ADMIN — ASPIRIN 81 MG: 81 TABLET, COATED ORAL at 08:30

## 2021-01-01 RX ADMIN — DOCUSATE SODIUM 50 MG AND SENNOSIDES 8.6 MG 2 TABLET: 8.6; 5 TABLET, FILM COATED ORAL at 20:15

## 2021-01-01 RX ADMIN — OXYCODONE HYDROCHLORIDE AND ACETAMINOPHEN 250 MG: 500 TABLET ORAL at 09:05

## 2021-01-01 RX ADMIN — AMLODIPINE BESYLATE 5 MG: 5 TABLET ORAL at 09:31

## 2021-01-01 RX ADMIN — DEXAMETHASONE SODIUM PHOSPHATE 4 MG: 4 INJECTION, SOLUTION INTRA-ARTICULAR; INTRALESIONAL; INTRAMUSCULAR; INTRAVENOUS; SOFT TISSUE at 05:26

## 2021-01-01 RX ADMIN — POLYETHYLENE GLYCOL 3350 17 G: 17 POWDER, FOR SOLUTION ORAL at 09:06

## 2021-01-01 RX ADMIN — PIPERACILLIN AND TAZOBACTAM 3.38 G: 3; .375 INJECTION, POWDER, LYOPHILIZED, FOR SOLUTION INTRAVENOUS at 08:38

## 2021-01-01 RX ADMIN — Medication 10 ML: at 05:47

## 2021-01-01 RX ADMIN — MIRTAZAPINE 15 MG: 15 TABLET, FILM COATED ORAL at 22:12

## 2021-01-01 RX ADMIN — DEXAMETHASONE SODIUM PHOSPHATE 6 MG: 4 INJECTION, SOLUTION INTRA-ARTICULAR; INTRALESIONAL; INTRAMUSCULAR; INTRAVENOUS; SOFT TISSUE at 13:46

## 2021-01-01 RX ADMIN — Medication 1 CAPSULE: at 10:57

## 2021-01-01 RX ADMIN — WHITE PETROLATUM,ZINC OXIDE: 57; 17 PASTE TOPICAL at 17:25

## 2021-01-01 RX ADMIN — GUAIFENESIN AND CODEINE PHOSPHATE 5 ML: 10; 100 LIQUID ORAL at 15:46

## 2021-01-01 RX ADMIN — CLOPIDOGREL BISULFATE 75 MG: 75 TABLET ORAL at 09:05

## 2021-01-01 RX ADMIN — BENZONATATE 200 MG: 100 CAPSULE ORAL at 22:33

## 2021-01-01 RX ADMIN — WHITE PETROLATUM,ZINC OXIDE: 57; 17 PASTE TOPICAL at 22:16

## 2021-01-01 RX ADMIN — FUROSEMIDE 40 MG: 40 TABLET ORAL at 12:34

## 2021-01-01 RX ADMIN — VANCOMYCIN HYDROCHLORIDE 750 MG: 750 INJECTION, POWDER, LYOPHILIZED, FOR SOLUTION INTRAVENOUS at 13:23

## 2021-01-01 RX ADMIN — OXYCODONE HYDROCHLORIDE AND ACETAMINOPHEN 250 MG: 500 TABLET ORAL at 08:30

## 2021-01-01 RX ADMIN — Medication 10 ML: at 06:15

## 2021-01-01 RX ADMIN — DEXAMETHASONE SODIUM PHOSPHATE 6 MG: 4 INJECTION, SOLUTION INTRA-ARTICULAR; INTRALESIONAL; INTRAMUSCULAR; INTRAVENOUS; SOFT TISSUE at 14:44

## 2021-01-01 RX ADMIN — ONDANSETRON 4 MG: 2 INJECTION INTRAMUSCULAR; INTRAVENOUS at 09:06

## 2021-01-01 RX ADMIN — Medication 1 CAPSULE: at 08:28

## 2021-01-01 RX ADMIN — Medication 1 CAPSULE: at 11:10

## 2021-01-01 RX ADMIN — PANTOPRAZOLE SODIUM 40 MG: 40 TABLET, DELAYED RELEASE ORAL at 06:04

## 2021-01-01 RX ADMIN — WHITE PETROLATUM,ZINC OXIDE: 57; 17 PASTE TOPICAL at 17:31

## 2021-01-01 RX ADMIN — FUROSEMIDE 40 MG: 10 INJECTION, SOLUTION INTRAMUSCULAR; INTRAVENOUS at 17:21

## 2021-01-01 RX ADMIN — CEFTRIAXONE SODIUM 2 G: 2 INJECTION, POWDER, FOR SOLUTION INTRAMUSCULAR; INTRAVENOUS at 20:00

## 2021-01-01 RX ADMIN — BENZONATATE 200 MG: 100 CAPSULE ORAL at 22:13

## 2021-01-01 RX ADMIN — WHITE PETROLATUM,ZINC OXIDE: 57; 17 PASTE TOPICAL at 11:10

## 2021-01-01 RX ADMIN — DEXAMETHASONE SODIUM PHOSPHATE 4 MG: 4 INJECTION, SOLUTION INTRA-ARTICULAR; INTRALESIONAL; INTRAMUSCULAR; INTRAVENOUS; SOFT TISSUE at 14:55

## 2021-01-01 RX ADMIN — Medication 10 ML: at 06:07

## 2021-01-01 RX ADMIN — BENZONATATE 200 MG: 100 CAPSULE ORAL at 17:28

## 2021-01-01 RX ADMIN — HYDROXYZINE PAMOATE 25 MG: 25 CAPSULE ORAL at 20:17

## 2021-01-01 RX ADMIN — LORATADINE 10 MG: 10 TABLET ORAL at 09:07

## 2021-01-01 RX ADMIN — PANTOPRAZOLE SODIUM 40 MG: 40 TABLET, DELAYED RELEASE ORAL at 08:29

## 2021-01-01 RX ADMIN — PANTOPRAZOLE SODIUM 40 MG: 40 TABLET, DELAYED RELEASE ORAL at 11:10

## 2021-01-01 RX ADMIN — OXYCODONE HYDROCHLORIDE AND ACETAMINOPHEN 250 MG: 500 TABLET ORAL at 22:13

## 2021-01-01 RX ADMIN — REMDESIVIR 100 MG: 100 INJECTION, POWDER, LYOPHILIZED, FOR SOLUTION INTRAVENOUS at 17:50

## 2021-01-01 RX ADMIN — AMLODIPINE BESYLATE 5 MG: 5 TABLET ORAL at 13:50

## 2021-01-01 RX ADMIN — POTASSIUM CHLORIDE 40 MEQ: 750 TABLET, EXTENDED RELEASE ORAL at 11:05

## 2021-01-01 RX ADMIN — LOSARTAN POTASSIUM 100 MG: 50 TABLET, FILM COATED ORAL at 09:07

## 2021-01-01 RX ADMIN — ENOXAPARIN SODIUM 30 MG: 100 INJECTION SUBCUTANEOUS at 06:04

## 2021-01-01 RX ADMIN — LABETALOL HYDROCHLORIDE 20 MG: 5 INJECTION, SOLUTION INTRAVENOUS at 21:11

## 2021-01-01 RX ADMIN — IOPAMIDOL 100 ML: 755 INJECTION, SOLUTION INTRAVENOUS at 16:34

## 2021-01-01 RX ADMIN — WHITE PETROLATUM,ZINC OXIDE: 57; 17 PASTE TOPICAL at 09:00

## 2021-01-01 RX ADMIN — AZITHROMYCIN MONOHYDRATE 500 MG: 500 INJECTION, POWDER, LYOPHILIZED, FOR SOLUTION INTRAVENOUS at 22:13

## 2021-01-01 RX ADMIN — BENZONATATE 200 MG: 100 CAPSULE ORAL at 23:42

## 2021-01-01 RX ADMIN — MELATONIN TAB 3 MG 3 MG: 3 TAB at 20:15

## 2021-01-01 RX ADMIN — FUROSEMIDE 40 MG: 40 TABLET ORAL at 09:31

## 2021-01-01 RX ADMIN — PIPERACILLIN AND TAZOBACTAM 3.38 G: 3; .375 INJECTION, POWDER, LYOPHILIZED, FOR SOLUTION INTRAVENOUS at 17:02

## 2021-01-01 RX ADMIN — GUAIFENESIN AND CODEINE PHOSPHATE 5 ML: 10; 100 LIQUID ORAL at 02:44

## 2021-01-01 RX ADMIN — ONDANSETRON 4 MG: 2 INJECTION INTRAMUSCULAR; INTRAVENOUS at 11:58

## 2021-01-01 RX ADMIN — Medication 10 ML: at 22:43

## 2021-01-01 RX ADMIN — LEVOTHYROXINE SODIUM 75 MCG: 0.07 TABLET ORAL at 11:10

## 2021-01-01 RX ADMIN — WHITE PETROLATUM,ZINC OXIDE: 57; 17 PASTE TOPICAL at 09:07

## 2021-01-01 RX ADMIN — ATORVASTATIN CALCIUM 20 MG: 20 TABLET, FILM COATED ORAL at 21:10

## 2021-01-01 RX ADMIN — Medication 10 ML: at 14:55

## 2021-01-01 RX ADMIN — Medication 2000 UNITS: at 09:31

## 2021-01-01 RX ADMIN — Medication 1 CAPSULE: at 09:00

## 2021-01-01 RX ADMIN — Medication 10 ML: at 21:28

## 2021-01-01 RX ADMIN — HYDRALAZINE HYDROCHLORIDE 10 MG: 10 TABLET, FILM COATED ORAL at 17:29

## 2021-01-01 RX ADMIN — Medication 2000 UNITS: at 09:48

## 2021-01-01 RX ADMIN — Medication 10 ML: at 13:04

## 2021-01-01 RX ADMIN — OXYCODONE HYDROCHLORIDE AND ACETAMINOPHEN 250 MG: 500 TABLET ORAL at 10:56

## 2021-01-01 RX ADMIN — DEXAMETHASONE SODIUM PHOSPHATE 6 MG: 4 INJECTION, SOLUTION INTRA-ARTICULAR; INTRALESIONAL; INTRAMUSCULAR; INTRAVENOUS; SOFT TISSUE at 06:07

## 2021-01-01 RX ADMIN — MELATONIN TAB 5 MG 10 MG: 5 TAB at 21:38

## 2021-01-01 RX ADMIN — ENOXAPARIN SODIUM 30 MG: 100 INJECTION SUBCUTANEOUS at 05:12

## 2021-01-01 RX ADMIN — Medication 10 ML: at 05:32

## 2021-01-01 RX ADMIN — PIPERACILLIN AND TAZOBACTAM 3.38 G: 3; .375 INJECTION, POWDER, LYOPHILIZED, FOR SOLUTION INTRAVENOUS at 17:20

## 2021-01-01 RX ADMIN — Medication 1 CAPSULE: at 09:05

## 2021-01-01 RX ADMIN — ONDANSETRON 4 MG: 2 INJECTION INTRAMUSCULAR; INTRAVENOUS at 00:36

## 2021-01-01 RX ADMIN — SODIUM CHLORIDE 75 ML/HR: 9 INJECTION, SOLUTION INTRAVENOUS at 17:29

## 2021-01-01 RX ADMIN — BENZONATATE 200 MG: 100 CAPSULE ORAL at 08:28

## 2021-01-01 RX ADMIN — LORAZEPAM 1 MG: 2 INJECTION INTRAMUSCULAR; INTRAVENOUS at 19:58

## 2021-01-01 RX ADMIN — AZITHROMYCIN MONOHYDRATE 500 MG: 250 TABLET ORAL at 20:02

## 2021-01-01 RX ADMIN — HYDROXYZINE PAMOATE 25 MG: 25 CAPSULE ORAL at 21:11

## 2021-01-01 RX ADMIN — WHITE PETROLATUM,ZINC OXIDE: 57; 17 PASTE TOPICAL at 22:01

## 2021-01-01 RX ADMIN — CLOPIDOGREL BISULFATE 75 MG: 75 TABLET ORAL at 11:10

## 2021-01-01 RX ADMIN — OXYCODONE HYDROCHLORIDE AND ACETAMINOPHEN 250 MG: 500 TABLET ORAL at 21:38

## 2021-01-01 RX ADMIN — Medication 1 CAPSULE: at 13:51

## 2021-01-01 RX ADMIN — BENZONATATE 200 MG: 100 CAPSULE ORAL at 16:44

## 2021-01-01 RX ADMIN — Medication 10 ML: at 05:23

## 2021-01-01 RX ADMIN — MIRTAZAPINE 15 MG: 15 TABLET, FILM COATED ORAL at 21:38

## 2021-01-01 RX ADMIN — Medication 10 ML: at 15:12

## 2021-01-01 RX ADMIN — VANCOMYCIN HYDROCHLORIDE 1250 MG: 1.25 INJECTION, POWDER, LYOPHILIZED, FOR SOLUTION INTRAVENOUS at 21:31

## 2021-01-01 RX ADMIN — LORAZEPAM 2 MG: 2 INJECTION INTRAMUSCULAR; INTRAVENOUS at 18:15

## 2021-01-01 RX ADMIN — ASPIRIN 81 MG: 81 TABLET, COATED ORAL at 11:09

## 2021-01-01 RX ADMIN — LEVOTHYROXINE SODIUM 75 MCG: 0.07 TABLET ORAL at 10:57

## 2021-01-01 RX ADMIN — BENZONATATE 200 MG: 100 CAPSULE ORAL at 21:38

## 2021-01-01 RX ADMIN — Medication 10 ML: at 13:24

## 2021-01-01 RX ADMIN — ASPIRIN 81 MG: 81 TABLET, COATED ORAL at 09:48

## 2021-01-01 RX ADMIN — ACETAMINOPHEN 650 MG: 325 TABLET ORAL at 20:15

## 2021-01-01 RX ADMIN — BENZONATATE 200 MG: 100 CAPSULE ORAL at 16:29

## 2021-01-01 RX ADMIN — CLOPIDOGREL BISULFATE 75 MG: 75 TABLET ORAL at 08:28

## 2021-01-01 RX ADMIN — DEXMEDETOMIDINE HYDROCHLORIDE 0.4 MCG/KG/HR: 100 INJECTION, SOLUTION, CONCENTRATE INTRAVENOUS at 16:03

## 2021-01-01 RX ADMIN — MELATONIN TAB 5 MG 10 MG: 5 TAB at 22:13

## 2021-01-01 RX ADMIN — AZITHROMYCIN MONOHYDRATE 500 MG: 500 INJECTION, POWDER, LYOPHILIZED, FOR SOLUTION INTRAVENOUS at 20:00

## 2021-01-01 RX ADMIN — Medication 10 ML: at 22:14

## 2021-01-01 RX ADMIN — OXYCODONE HYDROCHLORIDE AND ACETAMINOPHEN 250 MG: 500 TABLET ORAL at 21:26

## 2021-01-01 RX ADMIN — OXYCODONE HYDROCHLORIDE AND ACETAMINOPHEN 250 MG: 500 TABLET ORAL at 22:56

## 2021-01-01 RX ADMIN — Medication 10 ML: at 21:39

## 2021-01-01 RX ADMIN — CLOPIDOGREL BISULFATE 75 MG: 75 TABLET ORAL at 10:57

## 2021-01-01 RX ADMIN — Medication 10 ML: at 05:27

## 2021-01-01 RX ADMIN — OXYCODONE HYDROCHLORIDE AND ACETAMINOPHEN 250 MG: 500 TABLET ORAL at 09:48

## 2021-01-01 RX ADMIN — CLOPIDOGREL BISULFATE 75 MG: 75 TABLET ORAL at 09:48

## 2021-01-01 RX ADMIN — CALCIUM GLUCONATE: 98 INJECTION, SOLUTION INTRAVENOUS at 22:47

## 2021-01-01 RX ADMIN — BENZONATATE 200 MG: 100 CAPSULE ORAL at 09:05

## 2021-01-01 RX ADMIN — Medication 10 ML: at 14:17

## 2021-01-01 RX ADMIN — PIPERACILLIN AND TAZOBACTAM 3.38 G: 3; .375 INJECTION, POWDER, LYOPHILIZED, FOR SOLUTION INTRAVENOUS at 16:43

## 2021-01-01 RX ADMIN — PANTOPRAZOLE SODIUM 40 MG: 40 TABLET, DELAYED RELEASE ORAL at 07:01

## 2021-01-01 RX ADMIN — PANTOPRAZOLE SODIUM 40 MG: 40 TABLET, DELAYED RELEASE ORAL at 09:31

## 2021-01-01 RX ADMIN — LEVOTHYROXINE SODIUM 75 MCG: 0.07 TABLET ORAL at 09:48

## 2021-01-01 RX ADMIN — AMLODIPINE BESYLATE 5 MG: 5 TABLET ORAL at 09:34

## 2021-01-01 RX ADMIN — WHITE PETROLATUM,ZINC OXIDE: 57; 17 PASTE TOPICAL at 16:00

## 2021-01-01 RX ADMIN — ASPIRIN 81 MG: 81 TABLET, COATED ORAL at 08:44

## 2021-01-01 RX ADMIN — LEVOTHYROXINE SODIUM 75 MCG: 0.07 TABLET ORAL at 09:31

## 2021-01-01 RX ADMIN — Medication 2000 UNITS: at 10:56

## 2021-01-01 RX ADMIN — MIRTAZAPINE 15 MG: 15 TABLET, FILM COATED ORAL at 22:53

## 2021-01-01 RX ADMIN — LEVOTHYROXINE SODIUM 75 MCG: 0.07 TABLET ORAL at 08:18

## 2021-01-01 RX ADMIN — CLOPIDOGREL BISULFATE 75 MG: 75 TABLET ORAL at 09:31

## 2021-01-01 RX ADMIN — PIPERACILLIN AND TAZOBACTAM 3.38 G: 3; .375 INJECTION, POWDER, LYOPHILIZED, FOR SOLUTION INTRAVENOUS at 16:45

## 2021-01-01 RX ADMIN — BENZONATATE 200 MG: 100 CAPSULE ORAL at 11:09

## 2021-01-01 RX ADMIN — DEXAMETHASONE SODIUM PHOSPHATE 6 MG: 4 INJECTION, SOLUTION INTRA-ARTICULAR; INTRALESIONAL; INTRAMUSCULAR; INTRAVENOUS; SOFT TISSUE at 22:47

## 2021-01-01 RX ADMIN — DEXAMETHASONE SODIUM PHOSPHATE 4 MG: 4 INJECTION, SOLUTION INTRA-ARTICULAR; INTRALESIONAL; INTRAMUSCULAR; INTRAVENOUS; SOFT TISSUE at 06:04

## 2021-01-01 RX ADMIN — GUAIFENESIN AND CODEINE PHOSPHATE 5 ML: 10; 100 LIQUID ORAL at 05:12

## 2021-01-01 RX ADMIN — ENOXAPARIN SODIUM 30 MG: 100 INJECTION SUBCUTANEOUS at 05:02

## 2021-01-01 RX ADMIN — MORPHINE SULFATE 2 MG: 2 INJECTION, SOLUTION INTRAMUSCULAR; INTRAVENOUS at 19:56

## 2021-01-01 RX ADMIN — TRACE ELEMENTS INJECTION 4: 7.4; .75; 98; 151 INJECTION, SOLUTION INTRAVENOUS at 22:50

## 2021-01-01 RX ADMIN — LOSARTAN POTASSIUM 100 MG: 50 TABLET, FILM COATED ORAL at 08:44

## 2021-01-01 RX ADMIN — DEXAMETHASONE SODIUM PHOSPHATE 6 MG: 4 INJECTION, SOLUTION INTRA-ARTICULAR; INTRALESIONAL; INTRAMUSCULAR; INTRAVENOUS; SOFT TISSUE at 13:23

## 2021-01-01 RX ADMIN — CLOPIDOGREL BISULFATE 75 MG: 75 TABLET ORAL at 08:44

## 2021-01-01 RX ADMIN — LORATADINE 10 MG: 10 TABLET ORAL at 08:45

## 2021-01-01 RX ADMIN — ATORVASTATIN CALCIUM 40 MG: 40 TABLET, FILM COATED ORAL at 20:15

## 2021-01-01 RX ADMIN — ASPIRIN 81 MG: 81 TABLET, COATED ORAL at 09:05

## 2021-01-01 RX ADMIN — PIPERACILLIN AND TAZOBACTAM 3.38 G: 3; .375 INJECTION, POWDER, LYOPHILIZED, FOR SOLUTION INTRAVENOUS at 08:28

## 2021-01-01 RX ADMIN — ENOXAPARIN SODIUM 30 MG: 100 INJECTION SUBCUTANEOUS at 05:29

## 2021-01-01 RX ADMIN — MIRTAZAPINE 15 MG: 15 TABLET, FILM COATED ORAL at 23:01

## 2021-01-01 RX ADMIN — DEXAMETHASONE SODIUM PHOSPHATE 6 MG: 4 INJECTION, SOLUTION INTRA-ARTICULAR; INTRALESIONAL; INTRAMUSCULAR; INTRAVENOUS; SOFT TISSUE at 13:18

## 2021-01-01 RX ADMIN — BENZONATATE 200 MG: 100 CAPSULE ORAL at 16:45

## 2021-01-01 RX ADMIN — ENOXAPARIN SODIUM 30 MG: 100 INJECTION SUBCUTANEOUS at 05:23

## 2021-01-01 RX ADMIN — ERGOCALCIFEROL 50000 UNITS: 1.25 CAPSULE ORAL at 17:29

## 2021-01-01 RX ADMIN — Medication 10 ML: at 23:17

## 2021-01-01 RX ADMIN — PIPERACILLIN AND TAZOBACTAM 3.38 G: 3; .375 INJECTION, POWDER, LYOPHILIZED, FOR SOLUTION INTRAVENOUS at 01:21

## 2021-01-01 RX ADMIN — ENOXAPARIN SODIUM 30 MG: 100 INJECTION SUBCUTANEOUS at 05:26

## 2021-01-01 RX ADMIN — CLOPIDOGREL BISULFATE 75 MG: 75 TABLET ORAL at 09:06

## 2021-01-01 RX ADMIN — CALCIUM GLUCONATE: 98 INJECTION, SOLUTION INTRAVENOUS at 23:09

## 2021-01-01 RX ADMIN — SODIUM CHLORIDE 100 ML/HR: 9 INJECTION, SOLUTION INTRAVENOUS at 20:02

## 2021-01-01 RX ADMIN — DEXMEDETOMIDINE HYDROCHLORIDE 0.3 MCG/KG/HR: 100 INJECTION, SOLUTION, CONCENTRATE INTRAVENOUS at 05:44

## 2021-01-01 RX ADMIN — ASPIRIN 81 MG: 81 TABLET, COATED ORAL at 08:18

## 2021-01-01 RX ADMIN — DEXAMETHASONE SODIUM PHOSPHATE 10 MG: 10 INJECTION INTRAMUSCULAR; INTRAVENOUS at 20:02

## 2021-01-01 RX ADMIN — Medication 10 ML: at 16:45

## 2021-01-01 RX ADMIN — MELATONIN TAB 5 MG 10 MG: 5 TAB at 22:33

## 2021-01-01 RX ADMIN — MIRTAZAPINE 15 MG: 15 TABLET, FILM COATED ORAL at 22:14

## 2021-01-01 RX ADMIN — VANCOMYCIN HYDROCHLORIDE 1250 MG: 1.25 INJECTION, POWDER, LYOPHILIZED, FOR SOLUTION INTRAVENOUS at 12:25

## 2021-01-01 RX ADMIN — AMLODIPINE BESYLATE 5 MG: 5 TABLET ORAL at 11:09

## 2021-01-01 RX ADMIN — LEVOTHYROXINE SODIUM 75 MCG: 0.07 TABLET ORAL at 09:05

## 2021-01-01 RX ADMIN — FUROSEMIDE 40 MG: 40 TABLET ORAL at 08:45

## 2021-01-01 RX ADMIN — LORATADINE 10 MG: 10 TABLET ORAL at 09:06

## 2021-01-01 RX ADMIN — OXYCODONE HYDROCHLORIDE AND ACETAMINOPHEN 250 MG: 500 TABLET ORAL at 08:18

## 2021-01-01 RX ADMIN — I.V. FAT EMULSION 250 ML: 20 EMULSION INTRAVENOUS at 21:36

## 2021-01-01 RX ADMIN — SODIUM CHLORIDE 75 ML/HR: 9 INJECTION, SOLUTION INTRAVENOUS at 10:18

## 2021-01-01 RX ADMIN — VANCOMYCIN HYDROCHLORIDE 1250 MG: 1.25 INJECTION, POWDER, LYOPHILIZED, FOR SOLUTION INTRAVENOUS at 13:06

## 2021-01-01 RX ADMIN — Medication 10 ML: at 22:01

## 2021-01-01 RX ADMIN — OXYCODONE HYDROCHLORIDE AND ACETAMINOPHEN 250 MG: 500 TABLET ORAL at 09:31

## 2021-01-01 RX ADMIN — DEXAMETHASONE SODIUM PHOSPHATE 4 MG: 4 INJECTION, SOLUTION INTRA-ARTICULAR; INTRALESIONAL; INTRAMUSCULAR; INTRAVENOUS; SOFT TISSUE at 22:55

## 2021-01-01 RX ADMIN — DEXAMETHASONE SODIUM PHOSPHATE 6 MG: 4 INJECTION, SOLUTION INTRA-ARTICULAR; INTRALESIONAL; INTRAMUSCULAR; INTRAVENOUS; SOFT TISSUE at 21:58

## 2021-01-01 RX ADMIN — PIPERACILLIN AND TAZOBACTAM 3.38 G: 3; .375 INJECTION, POWDER, LYOPHILIZED, FOR SOLUTION INTRAVENOUS at 16:01

## 2021-01-01 RX ADMIN — Medication 10 ML: at 22:56

## 2021-01-01 RX ADMIN — ASPIRIN 81 MG: 81 TABLET, COATED ORAL at 09:34

## 2021-01-01 RX ADMIN — BENZONATATE 200 MG: 100 CAPSULE ORAL at 16:08

## 2021-01-01 RX ADMIN — BENZONATATE 200 MG: 100 CAPSULE ORAL at 22:12

## 2021-01-01 RX ADMIN — MELATONIN TAB 5 MG 10 MG: 5 TAB at 22:47

## 2021-01-01 RX ADMIN — LEVOTHYROXINE SODIUM 75 MCG: 0.07 TABLET ORAL at 07:01

## 2021-01-01 RX ADMIN — OXYCODONE HYDROCHLORIDE AND ACETAMINOPHEN 250 MG: 500 TABLET ORAL at 10:57

## 2021-01-01 RX ADMIN — Medication 10 ML: at 05:03

## 2021-01-01 RX ADMIN — LEVOTHYROXINE SODIUM 88 MCG: 88 TABLET ORAL at 05:28

## 2021-01-01 RX ADMIN — PIPERACILLIN AND TAZOBACTAM 3.38 G: 3; .375 INJECTION, POWDER, LYOPHILIZED, FOR SOLUTION INTRAVENOUS at 01:05

## 2021-01-01 RX ADMIN — PANTOPRAZOLE SODIUM 40 MG: 40 TABLET, DELAYED RELEASE ORAL at 05:28

## 2021-01-01 RX ADMIN — Medication 2000 UNITS: at 10:57

## 2021-01-01 RX ADMIN — ENOXAPARIN SODIUM 30 MG: 100 INJECTION SUBCUTANEOUS at 05:32

## 2021-01-01 RX ADMIN — BENZONATATE 200 MG: 100 CAPSULE ORAL at 15:46

## 2021-01-01 RX ADMIN — GUAIFENESIN AND CODEINE PHOSPHATE 5 ML: 10; 100 LIQUID ORAL at 16:29

## 2021-01-01 RX ADMIN — SODIUM CHLORIDE 30 ML/HR: 9 INJECTION, SOLUTION INTRAVENOUS at 11:18

## 2021-01-01 RX ADMIN — OXYCODONE HYDROCHLORIDE AND ACETAMINOPHEN 250 MG: 500 TABLET ORAL at 11:09

## 2021-01-01 RX ADMIN — Medication 1 CAPSULE: at 09:48

## 2021-01-01 RX ADMIN — DEXAMETHASONE SODIUM PHOSPHATE 6 MG: 4 INJECTION, SOLUTION INTRA-ARTICULAR; INTRALESIONAL; INTRAMUSCULAR; INTRAVENOUS; SOFT TISSUE at 05:23

## 2021-01-01 RX ADMIN — PIPERACILLIN AND TAZOBACTAM 3.38 G: 3; .375 INJECTION, POWDER, LYOPHILIZED, FOR SOLUTION INTRAVENOUS at 09:00

## 2021-01-01 RX ADMIN — DEXAMETHASONE SODIUM PHOSPHATE 6 MG: 4 INJECTION, SOLUTION INTRA-ARTICULAR; INTRALESIONAL; INTRAMUSCULAR; INTRAVENOUS; SOFT TISSUE at 05:12

## 2021-01-01 RX ADMIN — LEVOTHYROXINE SODIUM 75 MCG: 0.07 TABLET ORAL at 08:31

## 2021-01-01 RX ADMIN — OXYCODONE HYDROCHLORIDE AND ACETAMINOPHEN 250 MG: 500 TABLET ORAL at 22:48

## 2021-01-01 RX ADMIN — PANTOPRAZOLE SODIUM 40 MG: 40 TABLET, DELAYED RELEASE ORAL at 09:34

## 2021-01-01 RX ADMIN — Medication 10 ML: at 22:48

## 2021-01-01 RX ADMIN — CLOPIDOGREL BISULFATE 75 MG: 75 TABLET ORAL at 09:34

## 2021-01-01 RX ADMIN — Medication 10 ML: at 05:14

## 2021-01-01 RX ADMIN — PIPERACILLIN AND TAZOBACTAM 3.38 G: 3; .375 INJECTION, POWDER, LYOPHILIZED, FOR SOLUTION INTRAVENOUS at 01:12

## 2021-01-01 RX ADMIN — DEXAMETHASONE SODIUM PHOSPHATE 6 MG: 4 INJECTION, SOLUTION INTRA-ARTICULAR; INTRALESIONAL; INTRAMUSCULAR; INTRAVENOUS; SOFT TISSUE at 14:12

## 2021-01-01 RX ADMIN — PANTOPRAZOLE SODIUM 40 MG: 40 TABLET, DELAYED RELEASE ORAL at 08:18

## 2021-01-01 RX ADMIN — Medication 1 CAPSULE: at 09:31

## 2021-01-01 RX ADMIN — CLOPIDOGREL BISULFATE 75 MG: 75 TABLET ORAL at 08:31

## 2021-01-01 RX ADMIN — MIRTAZAPINE 15 MG: 15 TABLET, FILM COATED ORAL at 21:26

## 2021-01-01 RX ADMIN — MIRTAZAPINE 15 MG: 15 TABLET, FILM COATED ORAL at 22:33

## 2021-01-01 RX ADMIN — WHITE PETROLATUM,ZINC OXIDE: 57; 17 PASTE TOPICAL at 08:43

## 2021-01-01 RX ADMIN — VANCOMYCIN HYDROCHLORIDE 750 MG: 750 INJECTION, POWDER, LYOPHILIZED, FOR SOLUTION INTRAVENOUS at 14:12

## 2021-01-01 RX ADMIN — GUAIFENESIN AND CODEINE PHOSPHATE 5 ML: 10; 100 LIQUID ORAL at 11:09

## 2021-01-01 RX ADMIN — ASPIRIN 81 MG: 81 TABLET, COATED ORAL at 10:58

## 2021-01-01 RX ADMIN — SODIUM CHLORIDE 1000 ML: 9 INJECTION, SOLUTION INTRAVENOUS at 14:14

## 2021-01-01 RX ADMIN — BENZONATATE 200 MG: 100 CAPSULE ORAL at 08:30

## 2021-01-01 RX ADMIN — OXYCODONE HYDROCHLORIDE AND ACETAMINOPHEN 250 MG: 500 TABLET ORAL at 09:34

## 2021-01-01 RX ADMIN — AMLODIPINE BESYLATE 5 MG: 5 TABLET ORAL at 10:57

## 2021-01-01 RX ADMIN — WHITE PETROLATUM,ZINC OXIDE: 57; 17 PASTE TOPICAL at 23:50

## 2021-01-01 RX ADMIN — REMDESIVIR 200 MG: 100 INJECTION, POWDER, LYOPHILIZED, FOR SOLUTION INTRAVENOUS at 16:30

## 2021-01-01 RX ADMIN — VANCOMYCIN HYDROCHLORIDE 1250 MG: 1.25 INJECTION, POWDER, LYOPHILIZED, FOR SOLUTION INTRAVENOUS at 22:43

## 2021-01-01 RX ADMIN — Medication 10 ML: at 05:46

## 2021-01-01 RX ADMIN — DEXAMETHASONE SODIUM PHOSPHATE 6 MG: 4 INJECTION, SOLUTION INTRA-ARTICULAR; INTRALESIONAL; INTRAMUSCULAR; INTRAVENOUS; SOFT TISSUE at 22:52

## 2021-01-01 RX ADMIN — Medication 2000 UNITS: at 13:50

## 2021-01-01 RX ADMIN — BENZONATATE 200 MG: 100 CAPSULE ORAL at 08:18

## 2021-01-01 RX ADMIN — DEXAMETHASONE INTENSOL 10 MG: 1 SOLUTION, CONCENTRATE ORAL at 07:00

## 2021-01-01 RX ADMIN — BENZONATATE 200 MG: 100 CAPSULE ORAL at 09:34

## 2021-01-01 RX ADMIN — Medication 10 ML: at 22:34

## 2021-01-01 RX ADMIN — BENZONATATE 200 MG: 100 CAPSULE ORAL at 22:53

## 2021-01-01 RX ADMIN — ENOXAPARIN SODIUM 30 MG: 100 INJECTION SUBCUTANEOUS at 05:05

## 2021-01-01 RX ADMIN — WHITE PETROLATUM,ZINC OXIDE: 57; 17 PASTE TOPICAL at 23:01

## 2021-01-01 RX ADMIN — Medication 10 ML: at 06:05

## 2021-01-01 RX ADMIN — PIPERACILLIN AND TAZOBACTAM 3.38 G: 3; .375 INJECTION, POWDER, LYOPHILIZED, FOR SOLUTION INTRAVENOUS at 09:34

## 2021-01-01 RX ADMIN — PANTOPRAZOLE SODIUM 40 MG: 40 GRANULE, DELAYED RELEASE ORAL at 13:51

## 2021-01-01 RX ADMIN — MIRTAZAPINE 15 MG: 15 TABLET, FILM COATED ORAL at 22:46

## 2021-01-01 RX ADMIN — BENZONATATE 200 MG: 100 CAPSULE ORAL at 16:11

## 2021-01-01 RX ADMIN — OXYCODONE HYDROCHLORIDE AND ACETAMINOPHEN 250 MG: 500 TABLET ORAL at 08:29

## 2021-01-01 RX ADMIN — LEVOTHYROXINE SODIUM 75 MCG: 0.07 TABLET ORAL at 08:29

## 2021-01-01 RX ADMIN — DEXAMETHASONE SODIUM PHOSPHATE 4 MG: 4 INJECTION, SOLUTION INTRA-ARTICULAR; INTRALESIONAL; INTRAMUSCULAR; INTRAVENOUS; SOFT TISSUE at 05:29

## 2021-01-01 RX ADMIN — DEXAMETHASONE SODIUM PHOSPHATE 6 MG: 4 INJECTION, SOLUTION INTRA-ARTICULAR; INTRALESIONAL; INTRAMUSCULAR; INTRAVENOUS; SOFT TISSUE at 16:45

## 2021-01-01 RX ADMIN — MELATONIN TAB 5 MG 10 MG: 5 TAB at 22:55

## 2021-01-01 RX ADMIN — DEXAMETHASONE SODIUM PHOSPHATE 4 MG: 4 INJECTION, SOLUTION INTRA-ARTICULAR; INTRALESIONAL; INTRAMUSCULAR; INTRAVENOUS; SOFT TISSUE at 13:07

## 2021-01-01 RX ADMIN — ASPIRIN 81 MG: 81 TABLET, COATED ORAL at 10:56

## 2021-01-01 RX ADMIN — Medication 10 ML: at 22:53

## 2021-01-01 RX ADMIN — ASPIRIN 81 MG: 81 TABLET, COATED ORAL at 09:07

## 2021-01-01 RX ADMIN — MIRTAZAPINE 15 MG: 15 TABLET, FILM COATED ORAL at 22:28

## 2021-01-01 RX ADMIN — Medication 2000 UNITS: at 08:18

## 2021-01-01 RX ADMIN — AMLODIPINE BESYLATE 5 MG: 5 TABLET ORAL at 08:18

## 2021-01-01 RX ADMIN — ASPIRIN 81 MG: 81 TABLET, CHEWABLE ORAL at 13:50

## 2021-01-01 RX ADMIN — MELATONIN TAB 5 MG 10 MG: 5 TAB at 22:29

## 2021-01-01 RX ADMIN — VANCOMYCIN HYDROCHLORIDE 1250 MG: 1.25 INJECTION, POWDER, LYOPHILIZED, FOR SOLUTION INTRAVENOUS at 20:00

## 2021-01-01 RX ADMIN — Medication 10 ML: at 13:47

## 2021-01-01 RX ADMIN — MELATONIN TAB 5 MG 10 MG: 5 TAB at 23:01

## 2021-01-01 RX ADMIN — DEXAMETHASONE SODIUM PHOSPHATE 4 MG: 4 INJECTION, SOLUTION INTRA-ARTICULAR; INTRALESIONAL; INTRAMUSCULAR; INTRAVENOUS; SOFT TISSUE at 21:27

## 2021-01-01 RX ADMIN — WHITE PETROLATUM,ZINC OXIDE: 57; 17 PASTE TOPICAL at 10:19

## 2021-01-01 RX ADMIN — Medication 10 ML: at 13:06

## 2021-01-01 RX ADMIN — DEXAMETHASONE SODIUM PHOSPHATE 4 MG: 4 INJECTION, SOLUTION INTRA-ARTICULAR; INTRALESIONAL; INTRAMUSCULAR; INTRAVENOUS; SOFT TISSUE at 16:11

## 2021-01-01 RX ADMIN — PIPERACILLIN AND TAZOBACTAM 3.38 G: 3; .375 INJECTION, POWDER, LYOPHILIZED, FOR SOLUTION INTRAVENOUS at 02:36

## 2021-01-01 RX ADMIN — LEVOTHYROXINE SODIUM 88 MCG: 88 TABLET ORAL at 06:04

## 2021-01-01 RX ADMIN — BENZONATATE 200 MG: 100 CAPSULE ORAL at 15:43

## 2021-01-01 RX ADMIN — BENZONATATE 200 MG: 100 CAPSULE ORAL at 10:57

## 2021-01-01 RX ADMIN — CLOPIDOGREL BISULFATE 75 MG: 75 TABLET ORAL at 09:07

## 2021-01-01 RX ADMIN — SIMVASTATIN 40 MG: 10 TABLET, FILM COATED ORAL at 22:28

## 2021-01-01 RX ADMIN — WHITE PETROLATUM,ZINC OXIDE: 57; 17 PASTE TOPICAL at 16:18

## 2021-01-01 RX ADMIN — LEVOTHYROXINE SODIUM 75 MCG: 0.07 TABLET ORAL at 09:34

## 2021-01-01 RX ADMIN — MIRTAZAPINE 15 MG: 15 TABLET, FILM COATED ORAL at 06:00

## 2021-01-01 RX ADMIN — ENOXAPARIN SODIUM 30 MG: 100 INJECTION SUBCUTANEOUS at 06:06

## 2021-01-01 RX ADMIN — PANTOPRAZOLE SODIUM 40 MG: 40 TABLET, DELAYED RELEASE ORAL at 10:15

## 2021-01-01 RX ADMIN — Medication 2000 UNITS: at 08:29

## 2021-01-01 RX ADMIN — WHITE PETROLATUM,ZINC OXIDE: 57; 17 PASTE TOPICAL at 23:21

## 2021-01-01 RX ADMIN — Medication 2000 UNITS: at 08:31

## 2021-01-01 RX ADMIN — PIPERACILLIN AND TAZOBACTAM 3.38 G: 3; .375 INJECTION, POWDER, LYOPHILIZED, FOR SOLUTION INTRAVENOUS at 00:59

## 2021-01-01 RX ADMIN — PIPERACILLIN AND TAZOBACTAM 3.38 G: 3; .375 INJECTION, POWDER, LYOPHILIZED, FOR SOLUTION INTRAVENOUS at 10:18

## 2021-01-01 RX ADMIN — DEXAMETHASONE SODIUM PHOSPHATE 4 MG: 4 INJECTION, SOLUTION INTRA-ARTICULAR; INTRALESIONAL; INTRAMUSCULAR; INTRAVENOUS; SOFT TISSUE at 17:28

## 2021-01-01 RX ADMIN — Medication 2000 UNITS: at 11:09

## 2021-01-01 RX ADMIN — GUAIFENESIN AND CODEINE PHOSPHATE 5 ML: 10; 100 LIQUID ORAL at 22:47

## 2021-01-01 RX ADMIN — DEXAMETHASONE SODIUM PHOSPHATE 4 MG: 4 INJECTION, SOLUTION INTRA-ARTICULAR; INTRALESIONAL; INTRAMUSCULAR; INTRAVENOUS; SOFT TISSUE at 13:04

## 2021-01-01 RX ADMIN — OXYCODONE HYDROCHLORIDE AND ACETAMINOPHEN 250 MG: 500 TABLET ORAL at 23:01

## 2021-01-01 RX ADMIN — DEXTROSE AND SODIUM CHLORIDE 50 ML/HR: 5; 450 INJECTION, SOLUTION INTRAVENOUS at 14:44

## 2021-01-01 RX ADMIN — LABETALOL HYDROCHLORIDE 10 MG: 5 INJECTION INTRAVENOUS at 12:02

## 2021-01-01 RX ADMIN — PANTOPRAZOLE SODIUM 40 MG: 40 TABLET, DELAYED RELEASE ORAL at 09:48

## 2021-01-01 RX ADMIN — POLYETHYLENE GLYCOL 3350 17 G: 17 POWDER, FOR SOLUTION ORAL at 08:45

## 2021-01-01 RX ADMIN — Medication 4 MCG/MIN: at 00:00

## 2021-01-01 RX ADMIN — MELATONIN TAB 5 MG 10 MG: 5 TAB at 22:54

## 2021-01-01 RX ADMIN — DEXAMETHASONE SODIUM PHOSPHATE 6 MG: 4 INJECTION, SOLUTION INTRA-ARTICULAR; INTRALESIONAL; INTRAMUSCULAR; INTRAVENOUS; SOFT TISSUE at 05:46

## 2021-01-01 RX ADMIN — ACETAMINOPHEN 650 MG: 325 TABLET ORAL at 22:33

## 2021-02-04 PROBLEM — G45.9 TIA (TRANSIENT ISCHEMIC ATTACK): Status: ACTIVE | Noted: 2021-01-01

## 2021-02-04 NOTE — PROGRESS NOTES
Patient passed swallow screen and on regular diet. Discussed w/ NP, will plan for swallow eval in am on 2/5/2021.

## 2021-02-04 NOTE — H&P
History and Physical 
 
Patient: Alyson Martinez MRN: [id number]  SSN: [id number] YOB: 1930  Age: 80 y.o. Sex: female Subjective:  
  
Alyson Martinez is a 80 y.o. female who was brought to the hospital by EMS , reported by family was at home, and suddenly became less responsive and aphasic, after episode of vomiting. Family reports has been treated for pneumonia and was finishing antibiotic prescription. She was taking zofran before each dose of augmentin for nausea. Pt presented with slight aphasia and confusion , however, upon my assessment , pt was more alert and oriented, very [place] , following commands, speech clear and face is symmetrical. PMH includes carotid stenosis, hypertension,  
GERD, hypothyroidism, CAD, s/p right CEA in 2009 for 80% stenosis. On presentation, BP elevated 201/101, pt will be admitted for further evaluation and treatment. Past Medical History:  
Diagnosis Date  Hypercholesterolemia  Hypertension  Thyroid disease Past Surgical History:  
Procedure Laterality Date  HX HYSTERECTOMY Family History Problem Relation Age of Onset  Hypertension Mother  Cancer Father Social History Tobacco Use  Smoking status: Never Smoker  Smokeless tobacco: Never Used Substance Use Topics  Alcohol use: No  
  
Prior to Admission medications Medication Sig Start Date End Date Taking? Authorizing Provider  
valsartan (DIOVAN) 160 mg tablet Take 1 Tab by mouth daily. 9/15/17  Yes Genesis Chi NP  
ergocalciferol (ERGOCALCIFEROL) 50,000 unit capsule Take 1 Cap by mouth every seven (7) days. 8/24/17  Yes Genesis Chi NP  
loratadine (CLARITIN) 10 mg tablet Take 10 mg by mouth. Yes Provider, Historical  
esomeprazole (NEXIUM) 40 mg capsule Take  by mouth daily. Yes Provider, Historical  
simvastatin (ZOCOR) 40 mg tablet Take  by mouth nightly.    Yes Provider, Historical  
aspirin delayed-release 81 mg tablet Take  by mouth daily. Yes Provider, Historical  
levothyroxine (SYNTHROID) 88 mcg tablet Take  by mouth Daily (before breakfast). Yes Provider, Historical  
montelukast (SINGULAIR) 10 mg tablet Take 1 Tab by mouth daily. 8/22/17  Yes Genesis Chi NP  
cetaphil (CETAPHIL) lotion Apply  to affected area as needed for Dry Skin. 8/22/17   Genesis Chi NP  
estradiol (ESTRACE) 0.01 % (0.1 mg/gram) vaginal cream Insert 2 g into vagina daily. 8/22/17   Hadley Chi NP No Known Allergies Review of Systems: 
Review of Systems Constitutional: Negative for chills and fever. HENT: Negative for congestion and sore throat. Respiratory: Negative for cough and shortness of breath. Cardiovascular: Negative for chest pain and palpitations. Gastrointestinal: Negative for heartburn, nausea and vomiting. Genitourinary: Negative for dysuria and urgency. Musculoskeletal: Positive for back pain. Neurological: Negative for dizziness and headaches. Objective:  
 
Vitals:  
 02/04/21 1130 02/04/21 1202 02/04/21 1219 02/04/21 1413 BP: (!) 209/101 (!) 178/92 (!) 164/70 (!) [id number] Pulse: 72 74 66 69 Resp: 18  18 16 Temp: 97.6 °F (36.4 °C)  97.7 °F (36.5 °C) SpO2: 100%  99% 99% Weight: 68 kg (150 lb) Height: 5' 4\" (1.626 m) Physical Exam: 
Physical Exam  
Constitutional: She is oriented to person, place, and time. She appears well-developed. No distress. HENT:  
Right Ear: Decreased hearing is noted. Left Ear: Decreased hearing is noted. Eyes: Pupils are equal, round, and reactive to light. Neck: Normal range of motion. Neck supple. Cardiovascular: Normal rate and regular rhythm. Murmur heard. Pulmonary/Chest: Effort normal and breath sounds normal. No respiratory distress. Musculoskeletal: Normal range of motion. Neurological: She is alert and oriented to person, place, and time.   
 equal, speech clear, face symmetrical. No facial droop. Skin: Skin is warm and dry. Psychiatric: She has a normal mood and affect. Her behavior is normal.  
  
 
Recent Results (from the past 24 hour(s)) CBC WITH AUTOMATED DIFF Collection Time: 02/04/21 11:30 AM  
Result Value Ref Range WBC 11.1 (H) 3.6 - 11.0 K/uL  
 RBC 3.94 3.80 - 5.20 M/uL  
 HGB 13.2 11.5 - 16.0 g/dL HCT 39.3 35.0 - 47.0 % MCV 99.7 (H) 80.0 - 99.0 FL  
 MCH 33.5 26.0 - 34.0 PG  
 MCHC 33.6 30.0 - 36.5 g/dL  
 RDW 14.3 11.5 - 14.5 % PLATELET 653 277 - 866 K/uL MPV 9.6 8.9 - 12.9 FL  
 NEUTROPHILS 48 32 - 75 % LYMPHOCYTES 30 12 - 49 % MONOCYTES 8 5 - 13 % EOSINOPHILS 13 (H) 0 - 7 % BASOPHILS 0 0 - 1 % IMMATURE GRANULOCYTES 1 (H) 0.0 - 0.5 % ABS. NEUTROPHILS 5.5 1.8 - 8.0 K/UL  
 ABS. LYMPHOCYTES 3.3 0.8 - 3.5 K/UL  
 ABS. MONOCYTES 0.8 0.0 - 1.0 K/UL  
 ABS. EOSINOPHILS 1.4 (H) 0.0 - 0.4 K/UL  
 ABS. BASOPHILS 0.0 0.0 - 0.1 K/UL  
 ABS. IMM. GRANS. 0.1 (H) 0.00 - 0.04 K/UL  
 DF AUTOMATED METABOLIC PANEL, COMPREHENSIVE Collection Time: 02/04/21 11:30 AM  
Result Value Ref Range Sodium 140 136 - 145 mmol/L Potassium 3.9 3.5 - 5.1 mmol/L Chloride 106 97 - 108 mmol/L  
 CO2 29 21 - 32 mmol/L Anion gap 5 5 - 15 mmol/L Glucose 126 (H) 65 - 100 mg/dL BUN 17 6 - 20 mg/dL Creatinine 1.07 (H) 0.55 - 1.02 mg/dL BUN/Creatinine ratio 16 12 - 20 GFR est AA 58 (L) >60 ml/min/1.73m2 GFR est non-AA 48 (L) >60 ml/min/1.73m2 Calcium 9.2 8.5 - 10.1 mg/dL Bilirubin, total 0.5 0.2 - 1.0 mg/dL AST (SGOT) 27 15 - 37 U/L  
 ALT (SGPT) 24 12 - 78 U/L Alk. phosphatase 116 45 - 117 U/L Protein, total 7.2 6.4 - 8.2 g/dL Albumin 3.3 (L) 3.5 - 5.0 g/dL Globulin 3.9 2.0 - 4.0 g/dL A-G Ratio 0.8 (L) 1.1 - 2.2 PTT Collection Time: 02/04/21 11:30 AM  
Result Value Ref Range aPTT 25.5 23.0 - 35.7 sec  
 aPTT, therapeutic range   68 - 109 sec PROTHROMBIN TIME + INR  Collection Time: 02/04/21 11:30 AM Result Value Ref Range Prothrombin time 13.9 11.9 - 14.7 sec INR 1.1 0.9 - 1.1 URINALYSIS W/ REFLEX CULTURE Collection Time: 02/04/21 12:14 PM  
 Specimen: Urine Result Value Ref Range Color Gayl Aquino Appearance Clear Clear Specific gravity 1.026 1.003 - 1.030    
 pH (UA) 5.0 5.0 - 8.0 Protein 30 (A) Negative mg/dL Glucose Negative Negative mg/dL Ketone Negative Negative mg/dL Bilirubin Negative Negative Blood Negative Negative Urobilinogen 2.0 (H) 0.1 - 1.0 EU/dL Nitrites Negative Negative Leukocyte Esterase Negative Negative UA:UC IF INDICATED Culture not indicated by UA result Culture not indicated by UA result WBC 0-4 0 - 4 /hpf  
 RBC 0-5 0 - 5 /hpf Bacteria Negative Negative /hpf Mucus 1+ /lpf EKG, 12 LEAD, INITIAL Collection Time: 02/04/21 12:37 PM  
Result Value Ref Range Ventricular Rate 67 BPM  
 Atrial Rate 67 BPM  
 P-R Interval 168 ms QRS Duration 88 ms Q-T Interval 454 ms QTC Calculation (Bezet) 479 ms Calculated P Axis 52 degrees Calculated R Axis -16 degrees Calculated T Axis 70 degrees Diagnosis Normal sinus rhythm Nonspecific ST and T wave abnormality Prolonged QT Abnormal ECG When compared with ECG of 24-OCT-2016 08:39, 
Premature supraventricular complexes are no longer Present Nonspecific T wave abnormality now evident in Inferior leads Nonspecific T wave abnormality now evident in Anterior leads Confirmed by Katherine ORTIZ MD [id number]) on 2/4/2021 2:31:43 PM 
  
 
 
XR Results (maximum last 3): No results found for this or any previous visit. CT Results (maximum last 3): Results from [facility] [place] Encounter encounter on 02/04/21 CT CODE NEURO HEAD WO CONTRAST Narrative Study: Head CT without contrast. 
 
Clinical indication: Altered mental status/stroke alert.  
 
Technique: Routine volume acquisition of the head was obtained without IV 
contrast. Coronal and sagittal reformations were generated in soft tissue and 
bone kernels. Dose reduction: All CT scans at this facility are performed using 
dose reduction optimization techniques as appropriate to a performed exam 
including the following: Automated exposure control, adjustments of the mA 
and/or kV according to patient size, or use of iterative reconstruction 
technique. Comparison: Head CT 10/24/2016. Findings:  
 
Chronic left frontal lobe encephalomalacia. Gray-white matter differentiation is 
otherwise maintained. Scattered patchy periventricular and subcortical white 
matter hypodensities bilaterally, suggesting chronic small vessel ischemic 
change. No evidence of acute intracranial hemorrhage, mass effect, midline shift or 
abnormal extra-axial fluid collection. Ventricles and basal cisterns are 
preserved and are symmetric. Severe intracranial atherosclerosis. No evidence of skull fracture. Visuals paranasal sinuses and mastoid air cells 
are clear. Globes and orbits are intact. Impression No acute intracranial abnormality. Chronic changes as above. The findings were discussed with Dr. Marisela Delgado by Dr. Aristides Henriquez at [phone] hours on 
2/4/2021 via phone conversation. MRI Results (maximum last 3): No results found for this or any previous visit. Nuclear Medicine Results (maximum last 3): No results found for this or any previous visit. US Results (maximum last 3): No results found for this or any previous visit. Active Problems: 
  TIA (transient ischemic attack) (2/4/2021) Assessment/Plan: 1. AMS- r/o stroke, get CTA head and neck, CT head showing chronic left frontal lobe encephalomalacia, chronic small vessel ischemic changes. Consult to neurology, Continue with statin, low dose asa.  
 
2. Hypertensive urgency- will add prn hydralazine for greater than 180 , Continue with home medication 3. CAP- was diagnosed by PCP, finished course of abx, will check CXR.  
 
4. Generalized weakness- OT/PT/speech consults. DVT Prophylaxis sequential compression Code Status Ursula Grimes , Parviz Smith- daughter  Total Time Signed By: Gabby Redmond NP February 4, 2021

## 2021-02-04 NOTE — CONSULTS
Neurology Consult Note HPI Ms. Reyna Yanez is a 80 y.o.  female with a history significant for Carotid Stenosis s/p Right CEA 2009, HTN, HL, Thyroid Disease, GERD who presented with decreased responsiveness and difficulty getting words out. Per chart review, patient has been getting treated for pneumonia with antibiotics which she was finishing. She was also taking ondansetron for associated nausea. Patient reportedly had an episode of vomiting at home after which patient became less responsive and was not producing speech. By the time patient was evaluated in the ED there were no neurologic deficits appreciated. In the ED, patient had elevated systolic blood pressures to the 181E and diastolics up to the 652U. Patient had leukocytosis of 11.1. Emergent CT head was negative for any acute findings. Patient reports that she does not have any specific complaints this morning but just does not feel right. She reports that she lives at home with her sister and gets around in a walker. She has no known personal history of seizures or strokes or cancer that she is aware of. She later reports that she is got some pain in her bilateral hips and bilateral knees. Home Stroke Prophylaxis: ASA 81, Simvastatin 40 [address] Remote left frontal encephalomalacia [facility] 
 
CTA Head/Neck Right distal A3 severe stenosis/near occlusion that seems likely chronic Left M3 severe stenosis/near occlusion that seems likely chronic Generalized Intracranial atherosclerotic disease Bilateral carotid atherosclerotic disease without significant stenosis IMPRESSION Ms. Reyna Yanez is a 80 y.o.  female with the above history presented with decreased responsiveness and difficult time speaking.   Patient review, patient had been finishing antibiotic course for pneumonia along with ondansetron but had breakthrough vomiting after which patient reportedly had decreased responsiveness and difficulty getting her words out. By the time patient was evaluated in the ED she had returned completely to her baseline. Emergent CT head was negative for acute findings. Etiology of patient's symptoms possibly related to TIA given severe stenosis of left M3 which can result in difficulties with language or related to focal seizure activity given left MCA encephalomalacia found on CT head and episodic nature of patient's symptoms. Will obtain TTE for further evaluation as part of TIA work-up. Additionally, given patient's severe stenosis of intracranial vessels and generalized atherosclerotic intracranial disease will start patient on dual antiplatelet therapy with aspirin and clopidogrel per below for neurovascular prophylaxis and increase home statin. We will plan to obtain outpatient EEG for evaluation of cortical irritability or focal abnormalities that would indicate for seizure threshold. RECOMMENDATIONS Episodic Decreased Responsiveness & Aphasia, Possible TIA vs Possible Focal Seizure Severe stenosis of Right A3 and Left M3 Intracranial Branches Remote [place] Infarct 
-Q4Hr NeuroChecks, TELE 
-SBP Goal < 160 
-Seizure Precautions 
-Stroke Prophylaxis: ASA 81, Clopidogrel 75, Atorvastatin 40 
-No AEDs for now 
-STAT IV Lorazepam 2 mg with any clinical seizure activity lasting > 3 minutes and contact Neurology for further recommendations 
-Will plan for outpatient EEG 
-PT/OT/ST as needed 
-F/U TTE, Lipid Panel, HgbA1c Patient to follow-up with neurology in 2 weeks from discharge for EEG. No further inpatient neurologic work-up is necessary. Please contact neurology with any further questions/concerns. Diagnosis and plan was discussed extensively with patient who is in agreement with the above plan. They have been counseled regarding potential side effects of the interventions above and would like to proceed with the aforementioned plan. Review of Systems A 14 point review was done and pertinent positives & negative findings are listed as part of the history of present illness, all other systems were reviewed and are negative. Physical/Neurological Exam 
General: Elderly  female Cardiovascular: normal rate Pulmonary: Some slight rhonchi Gastrointestinal/Abdomen: soft w/hypoactive bowel sounds Skin: warm and dry Patient awake, alert; following central and peripheral commands No expressive or receptive aphasia; No dysarthria Pupils react to light bilaterally; EOM Intact No visual field deficits on gross exam  
Unable to visualize optic disc had a retinal vessels on funduscopic examination Intact to light touch on face bilaterally No facial droop No gross hearing loss Tongue is midline Motor: 4+/5 in bilateral upper extremities, 4 -/5 in the bilateral lower extremity (pain limited) No abnormal movements Normal tone throughout Sensation to light touch intact grossly throughout Toes equivocal bilaterally NIHSS: 0 Past Medical History:  
Diagnosis Date  Hypercholesterolemia  Hypertension  Thyroid disease Past Surgical History:  
Procedure Laterality Date  HX HYSTERECTOMY No Known Allergies Family History Problem Relation Age of Onset  Hypertension Mother  Cancer Father Relationships Social connections  Talks on phone: Not on file  Gets together: Not on file  Attends Yarsanism service: Not on file  Active member of club or organization: Not on file  Attends meetings of clubs or organizations: Not on file  Relationship status: Not on file Medications Current Outpatient Medications Medication Instructions  aspirin delayed-release 81 mg tablet Oral, DAILY  cetaphil (CETAPHIL) lotion Topical, AS NEEDED  ergocalciferol (ERGOCALCIFEROL) 50,000 Units, Oral, EVERY 7 DAYS  esomeprazole (NEXIUM) 40 mg capsule Oral, DAILY  estradioL (ESTRACE) 2 g, Vaginal, DAILY  levothyroxine (SYNTHROID) 88 mcg tablet Oral, DAILY BEFORE BREAKFAST  loratadine (CLARITIN) 10 mg, Oral  
 montelukast (SINGULAIR) 10 mg, Oral, DAILY  simvastatin (ZOCOR) 40 mg tablet Oral, EVERY BEDTIME  valsartan (DIOVAN) 160 mg, Oral, DAILY Current Facility-Administered Medications Medication Dose Route Frequency  [START ON 2/5/2021] aspirin delayed-release tablet 81 mg  81 mg Oral DAILY  ergocalciferol capsule 50,000 Units  50,000 Units Oral Q7D  
 [START ON 2/5/2021] pantoprazole (PROTONIX) tablet 40 mg  40 mg Oral ACB  [START ON 2/5/2021] levothyroxine (SYNTHROID) tablet 88 mcg  88 mcg Oral ACB  [START ON 2/5/2021] loratadine (CLARITIN) tablet 10 mg  10 mg Oral DAILY  atorvastatin (LIPITOR) tablet 20 mg  20 mg Oral QHS  [START ON 2/5/2021] losartan (COZAAR) tablet 100 mg  100 mg Oral DAILY  0.9% sodium chloride infusion  75 mL/hr IntraVENous CONTINUOUS  
 acetaminophen (TYLENOL) tablet 650 mg  650 mg Oral Q4H PRN  
 ondansetron (ZOFRAN) injection 4 mg  4 mg IntraVENous Q6H PRN  
 melatonin tablet 3 mg  3 mg Oral QHS PRN  
 hydrOXYzine pamoate (VISTARIL) capsule 25 mg  25 mg Oral TID PRN  
 hydrALAZINE (APRESOLINE) tablet 10 mg  10 mg Oral BID PRN Current Outpatient Medications Medication Sig  
 valsartan (DIOVAN) 160 mg tablet Take 1 Tab by mouth daily.  ergocalciferol (ERGOCALCIFEROL) 50,000 unit capsule Take 1 Cap by mouth every seven (7) days.  loratadine (CLARITIN) 10 mg tablet Take 10 mg by mouth.  esomeprazole (NEXIUM) 40 mg capsule Take  by mouth daily.  simvastatin (ZOCOR) 40 mg tablet Take  by mouth nightly.  aspirin delayed-release 81 mg tablet Take  by mouth daily.  levothyroxine (SYNTHROID) 88 mcg tablet Take  by mouth Daily (before breakfast).  montelukast (SINGULAIR) 10 mg tablet Take 1 Tab by mouth daily.  cetaphil (CETAPHIL) lotion Apply  to affected area as needed for Dry Skin.   
 estradiol (ESTRACE) 0.01 % (0.1 mg/gram) vaginal cream Insert 2 g into vagina daily. Objective Temp:  [97.6 °F (36.4 °C)-98.4 °F (36.9 °C)] Pulse (Heart Rate):  Tony Ohm BP: (164-209)/() Resp Rate:  [16-18] O2 Sat (%):  [97 %-100 %] Weight:  [68 kg (150 lb)] No intake or output data in the 24 hours ending 02/04/21 1707 Wt Readings from Last 3 Encounters:  
02/04/21 68 kg (150 lb)  
09/15/17 68 kg (150 lb) 08/22/17 67.1 kg (148 lb) Labs Recent Results (from the past 24 hour(s)) CBC WITH AUTOMATED DIFF Collection Time: 02/04/21 11:30 AM  
Result Value Ref Range WBC 11.1 (H) 3.6 - 11.0 K/uL  
 RBC 3.94 3.80 - 5.20 M/uL  
 HGB 13.2 11.5 - 16.0 g/dL HCT 39.3 35.0 - 47.0 % MCV 99.7 (H) 80.0 - 99.0 FL  
 MCH 33.5 26.0 - 34.0 PG  
 MCHC 33.6 30.0 - 36.5 g/dL  
 RDW 14.3 11.5 - 14.5 % PLATELET 233 605 - 484 K/uL MPV 9.6 8.9 - 12.9 FL  
 NEUTROPHILS 48 32 - 75 % LYMPHOCYTES 30 12 - 49 % MONOCYTES 8 5 - 13 % EOSINOPHILS 13 (H) 0 - 7 % BASOPHILS 0 0 - 1 % IMMATURE GRANULOCYTES 1 (H) 0.0 - 0.5 % ABS. NEUTROPHILS 5.5 1.8 - 8.0 K/UL  
 ABS. LYMPHOCYTES 3.3 0.8 - 3.5 K/UL  
 ABS. MONOCYTES 0.8 0.0 - 1.0 K/UL  
 ABS. EOSINOPHILS 1.4 (H) 0.0 - 0.4 K/UL  
 ABS. BASOPHILS 0.0 0.0 - 0.1 K/UL  
 ABS. IMM. GRANS. 0.1 (H) 0.00 - 0.04 K/UL  
 DF AUTOMATED METABOLIC PANEL, COMPREHENSIVE Collection Time: 02/04/21 11:30 AM  
Result Value Ref Range Sodium 140 136 - 145 mmol/L Potassium 3.9 3.5 - 5.1 mmol/L Chloride 106 97 - 108 mmol/L  
 CO2 29 21 - 32 mmol/L Anion gap 5 5 - 15 mmol/L Glucose 126 (H) 65 - 100 mg/dL BUN 17 6 - 20 mg/dL Creatinine 1.07 (H) 0.55 - 1.02 mg/dL BUN/Creatinine ratio 16 12 - 20 GFR est AA 58 (L) >60 ml/min/1.73m2 GFR est non-AA 48 (L) >60 ml/min/1.73m2 Calcium 9.2 8.5 - 10.1 mg/dL Bilirubin, total 0.5 0.2 - 1.0 mg/dL AST (SGOT) 27 15 - 37 U/L  
 ALT (SGPT) 24 12 - 78 U/L Alk. phosphatase 116 45 - 117 U/L  Protein, total 7.2 6.4 - 8.2 g/dL Albumin 3.3 (L) 3.5 - 5.0 g/dL Globulin 3.9 2.0 - 4.0 g/dL A-G Ratio 0.8 (L) 1.1 - 2.2 PTT Collection Time: 02/04/21 11:30 AM  
Result Value Ref Range aPTT 25.5 23.0 - 35.7 sec  
 aPTT, therapeutic range   68 - 109 sec PROTHROMBIN TIME + INR Collection Time: 02/04/21 11:30 AM  
Result Value Ref Range Prothrombin time 13.9 11.9 - 14.7 sec INR 1.1 0.9 - 1.1 URINALYSIS W/ REFLEX CULTURE Collection Time: 02/04/21 12:14 PM  
 Specimen: Urine Result Value Ref Range Color [employer] Appearance Clear Clear Specific gravity 1.026 1.003 - 1.030    
 pH (UA) 5.0 5.0 - 8.0 Protein 30 (A) Negative mg/dL Glucose Negative Negative mg/dL Ketone Negative Negative mg/dL Bilirubin Negative Negative Blood Negative Negative Urobilinogen 2.0 (H) 0.1 - 1.0 EU/dL Nitrites Negative Negative Leukocyte Esterase Negative Negative UA:UC IF INDICATED Culture not indicated by UA result Culture not indicated by UA result WBC 0-4 0 - 4 /hpf  
 RBC 0-5 0 - 5 /hpf Bacteria Negative Negative /hpf Mucus 1+ /lpf EKG, 12 LEAD, INITIAL Collection Time: 02/04/21 12:37 PM  
Result Value Ref Range Ventricular Rate 67 BPM  
 Atrial Rate 67 BPM  
 P-R Interval 168 ms QRS Duration 88 ms Q-T Interval 454 ms QTC Calculation (Bezet) 479 ms Calculated P Axis 52 degrees Calculated R Axis -16 degrees Calculated T Axis 70 degrees Diagnosis Normal sinus rhythm Nonspecific ST and T wave abnormality Prolonged QT Abnormal ECG When compared with ECG of 24-OCT-2016 08:39, 
Premature supraventricular complexes are no longer Present Nonspecific T wave abnormality now evident in Inferior leads Nonspecific T wave abnormality now evident in Anterior leads Confirmed by Margarita Villarreal MD, Nic Mancilla [id number]) on 2/4/2021 2:31:43 PM 
  
  
 
 
Significant Diagnostic Studies All images independently visualized CT CODE NEURO HEAD WO CONTRAST Final Result No acute intracranial abnormality. Chronic changes as above. The findings were discussed with Dr. Lauren Lynn by Dr. Lux Chou at [phone] hours on  
2/4/2021 via phone conversation. CTA CODE NEURO HEAD AND NECK W CONT    (Results Pending) XR CHEST PORT    (Results Pending) This document has been prepared by the Dragon voice recognition system, typographical errors may have occurred.  Attempts have been made to correct errors, however inadvertent errors may persist.

## 2021-02-04 NOTE — ED PROVIDER NOTES
EMERGENCY DEPARTMENT HISTORY AND PHYSICAL EXAM 
 
 
Date: 2/4/2021 
Patient Name: Gregoria Kohli 
 
 
History of Presenting Illness  
 
Chief Complaint  
Patient presents with  
• Unresponsive  
• Extremity Weakness  
 
 
History Provided By: EMS 
 
HPI: Gregoria Kohli, 89 y.o. female with a past medical history significant hypertension presents to the ED with cc of acute onset of aphasia and left-sided gaze.  EMS conveys that the patient's family reported that she acutely stopped talking and has been constant ever since.  There were no treatments prior to arrival.  Patient is unable to provide any history.  They said that she was diffusely weak.  They also noted that her blood pressure was elevated. 
 
There are no other complaints, changes, or physical findings at this time. 
 
PCP: Fareed Brown MD 
 
Current Outpatient Medications  
Medication Sig Dispense Refill  
• valsartan (DIOVAN) 160 mg tablet Take 1 Tab by mouth daily. 30 Tab 1  
• ergocalciferol (ERGOCALCIFEROL) 50,000 unit capsule Take 1 Cap by mouth every seven (7) days. 12 Cap 0  
• loratadine (CLARITIN) 10 mg tablet Take 10 mg by mouth.    
• esomeprazole (NEXIUM) 40 mg capsule Take  by mouth daily.    
• simvastatin (ZOCOR) 40 mg tablet Take  by mouth nightly.    
• aspirin delayed-release 81 mg tablet Take  by mouth daily.    
• levothyroxine (SYNTHROID) 88 mcg tablet Take  by mouth Daily (before breakfast).    
• montelukast (SINGULAIR) 10 mg tablet Take 1 Tab by mouth daily. 30 Tab 0  
• cetaphil (CETAPHIL) lotion Apply  to affected area as needed for Dry Skin. 400 mL 0  
• estradiol (ESTRACE) 0.01 % (0.1 mg/gram) vaginal cream Insert 2 g into vagina daily. 42.5 g 1  
 
 
Past History  
 
Past Medical History: 
Past Medical History:  
Diagnosis Date  
• Hypercholesterolemia   
• Hypertension   
• Thyroid disease   
 
 
Past Surgical History: 
Past Surgical History:  
Procedure Laterality Date  
• HX HYSTERECTOMY    
 
 
Family History:  Family History Problem Relation Age of Onset  Hypertension Mother  Cancer Father Social History: 
Social History Tobacco Use  Smoking status: Never Smoker  Smokeless tobacco: Never Used Substance Use Topics  Alcohol use: No  
 Drug use: No  
 
 
Allergies: 
No Known Allergies Review of Systems Review of Systems Unable to perform ROS: Mental status change Physical Exam  
 
Physical Exam 
Vitals signs and nursing note reviewed. Constitutional:   
   General: She is not in acute distress. Appearance: She is well-developed. She is ill-appearing and toxic-appearing. HENT:  
   Head: Normocephalic and atraumatic. Nose: Nose normal.  
   Mouth/Throat:  
   Mouth: Mucous membranes are moist.  
   Pharynx: Oropharynx is clear. No oropharyngeal exudate. Eyes:  
   General:     
   Right eye: No discharge. Left eye: No discharge. Conjunctiva/sclera: Conjunctivae normal.  
   Pupils: Pupils are equal, round, and reactive to light. Neck: Musculoskeletal: Normal range of motion and neck supple. Cardiovascular:  
   Rate and Rhythm: Normal rate and regular rhythm. Chest Wall: PMI is not displaced. No thrill. Heart sounds: Normal heart sounds. No murmur. No friction rub. No gallop. Pulmonary:  
   Effort: Pulmonary effort is normal. No respiratory distress. Breath sounds: Normal breath sounds. No wheezing or rales. Chest:  
   Chest wall: No tenderness. Abdominal:  
   General: Bowel sounds are normal. There is no distension. Palpations: Abdomen is soft. There is no mass. Tenderness: There is no abdominal tenderness. There is no guarding or rebound. Musculoskeletal: Normal range of motion. Lymphadenopathy:  
   Cervical: No cervical adenopathy. Skin: 
   General: Skin is warm and dry. Capillary Refill: Capillary refill takes less than 2 seconds. Findings: No erythema or rash. Neurological: General: No focal deficit present. Mental Status: She is alert. She is disoriented. Cranial Nerves: No cranial nerve deficit. Motor: Weakness present. Coordination: Coordination normal.  
Psychiatric:     
   Mood and Affect: Mood normal.     
   Behavior: Behavior normal.  
 
 
 
Lab and Diagnostic Study Results Labs - Recent Results (from the past 12 hour(s)) CBC WITH AUTOMATED DIFF Collection Time: 02/04/21 11:30 AM  
Result Value Ref Range WBC 11.1 (H) 3.6 - 11.0 K/uL  
 RBC 3.94 3.80 - 5.20 M/uL  
 HGB 13.2 11.5 - 16.0 g/dL HCT 39.3 35.0 - 47.0 % MCV 99.7 (H) 80.0 - 99.0 FL  
 MCH 33.5 26.0 - 34.0 PG  
 MCHC 33.6 30.0 - 36.5 g/dL  
 RDW 14.3 11.5 - 14.5 % PLATELET 563 096 - 779 K/uL MPV 9.6 8.9 - 12.9 FL  
 NEUTROPHILS 48 32 - 75 % LYMPHOCYTES 30 12 - 49 % MONOCYTES 8 5 - 13 % EOSINOPHILS 13 (H) 0 - 7 % BASOPHILS 0 0 - 1 % IMMATURE GRANULOCYTES 1 (H) 0.0 - 0.5 % ABS. NEUTROPHILS 5.5 1.8 - 8.0 K/UL  
 ABS. LYMPHOCYTES 3.3 0.8 - 3.5 K/UL  
 ABS. MONOCYTES 0.8 0.0 - 1.0 K/UL  
 ABS. EOSINOPHILS 1.4 (H) 0.0 - 0.4 K/UL  
 ABS. BASOPHILS 0.0 0.0 - 0.1 K/UL  
 ABS. IMM. GRANS. 0.1 (H) 0.00 - 0.04 K/UL  
 DF AUTOMATED METABOLIC PANEL, COMPREHENSIVE Collection Time: 02/04/21 11:30 AM  
Result Value Ref Range Sodium 140 136 - 145 mmol/L Potassium 3.9 3.5 - 5.1 mmol/L Chloride 106 97 - 108 mmol/L  
 CO2 29 21 - 32 mmol/L Anion gap 5 5 - 15 mmol/L Glucose 126 (H) 65 - 100 mg/dL BUN 17 6 - 20 mg/dL Creatinine 1.07 (H) 0.55 - 1.02 mg/dL BUN/Creatinine ratio 16 12 - 20 GFR est AA 58 (L) >60 ml/min/1.73m2 GFR est non-AA 48 (L) >60 ml/min/1.73m2 Calcium 9.2 8.5 - 10.1 mg/dL Bilirubin, total 0.5 0.2 - 1.0 mg/dL AST (SGOT) 27 15 - 37 U/L  
 ALT (SGPT) 24 12 - 78 U/L Alk. phosphatase 116 45 - 117 U/L Protein, total 7.2 6.4 - 8.2 g/dL Albumin 3.3 (L) 3.5 - 5.0 g/dL Globulin 3.9 2.0 - 4.0 g/dL  A-G Ratio 0.8 (L) 1.1 - 2.2 PTT Collection Time: 02/04/21 11:30 AM  
Result Value Ref Range aPTT 25.5 23.0 - 35.7 sec  
 aPTT, therapeutic range   68 - 109 sec PROTHROMBIN TIME + INR Collection Time: 02/04/21 11:30 AM  
Result Value Ref Range Prothrombin time 13.9 11.9 - 14.7 sec INR 1.1 0.9 - 1.1 URINALYSIS W/ REFLEX CULTURE Collection Time: 02/04/21 12:14 PM  
 Specimen: Urine Result Value Ref Range Color [place] Appearance Clear Clear Specific gravity 1.026 1.003 - 1.030    
 pH (UA) 5.0 5.0 - 8.0 Protein 30 (A) Negative mg/dL Glucose Negative Negative mg/dL Ketone Negative Negative mg/dL Bilirubin Negative Negative Blood Negative Negative Urobilinogen 2.0 (H) 0.1 - 1.0 EU/dL Nitrites Negative Negative Leukocyte Esterase Negative Negative UA:UC IF INDICATED Culture not indicated by UA result Culture not indicated by UA result WBC 0-4 0 - 4 /hpf  
 RBC 0-5 0 - 5 /hpf Bacteria Negative Negative /hpf Mucus 1+ /lpf EKG, 12 LEAD, INITIAL Collection Time: 02/04/21 12:37 PM  
Result Value Ref Range Ventricular Rate 67 BPM  
 Atrial Rate 67 BPM  
 P-R Interval 168 ms QRS Duration 88 ms Q-T Interval 454 ms QTC Calculation (Bezet) 479 ms Calculated P Axis 52 degrees Calculated R Axis -16 degrees Calculated T Axis 70 degrees Diagnosis Normal sinus rhythm Nonspecific ST and T wave abnormality Prolonged QT Abnormal ECG When compared with ECG of 24-OCT-2016 08:39, 
Premature supraventricular complexes are no longer Present Nonspecific T wave abnormality now evident in Inferior leads Nonspecific T wave abnormality now evident in Anterior leads Confirmed by Wilhelminia Claude MD, Billy Varner ([id number]) on 2/4/2021 2:31:43 PM 
  
 
 
Radiologic Studies -  
[unfilled] CT Results  (Last 48 hours) 02/04/21 1140  CT CODE NEURO HEAD WO CONTRAST Final result Impression:  No acute intracranial abnormality.  Chronic changes as above. The findings were discussed with Dr. Rica Hanna by Dr. Monica Brown at [phone] hours on  
2/4/2021 via phone conversation. Narrative:  Study: Head CT without contrast.  
   
Clinical indication: Altered mental status/stroke alert. Technique: Routine volume acquisition of the head was obtained without IV  
contrast. Coronal and sagittal reformations were generated in soft tissue and  
bone kernels. Dose reduction: All CT scans at this facility are performed using  
dose reduction optimization techniques as appropriate to a performed exam  
including the following: Automated exposure control, adjustments of the mA  
and/or kV according to patient size, or use of iterative reconstruction  
technique. Comparison: Head CT 10/24/2016. Findings:   
   
Chronic left frontal lobe encephalomalacia. Gray-white matter differentiation is  
otherwise maintained. Scattered patchy periventricular and subcortical white  
matter hypodensities bilaterally, suggesting chronic small vessel ischemic  
change. No evidence of acute intracranial hemorrhage, mass effect, midline shift or  
abnormal extra-axial fluid collection. Ventricles and basal cisterns are  
preserved and are symmetric. Severe intracranial atherosclerosis. No evidence of skull fracture. Visuals paranasal sinuses and mastoid air cells  
are clear. Globes and orbits are intact. CXR Results  (Last 48 hours) None Medical Decision Making and ED Course - I am the first and primary provider for this patient AND AM THE PRIMARY PROVIDER OF RECORD. - I reviewed the vital signs, available nursing notes, past medical history, past surgical history, family history and social history. - Initial assessment performed. The patients presenting problems have been discussed, and the staff are in agreement with the care plan formulated and outlined with them.   I have encouraged them to ask questions as they arise throughout their visit. Vital Signs-Reviewed the patient's vital signs. Patient Vitals for the past 12 hrs: 
 Temp Pulse Resp BP SpO2  
02/04/21 1413  69 16 (!) 167/73 99 % 02/04/21 1219 97.7 °F (36.5 °C) 66 18 (!) 164/70 99 % 02/04/21 1202  74  (!) 178/92   
02/04/21 1130 97.6 °F (36.4 °C) 72 18 (!) 209/101 100 % EKG interpretation: (Preliminary): Performed at 12:37 PM, and read at 12:43 PM 
Ventricular rate 67 bpm,  ms, QRS duration 80 ms, QTC 4 and 79 ms. Interpretation: Normal sinus rhythm with nonspecific T wave abnormality. Prolonged QT. Abnormal EKG. Records Reviewed: Nursing Notes and Old Medical Records The patient presents with after having an acute altered sensorium episode With a differential diagnosis of TIA, CVA, stroke. Will proceed with stroke work-up ED Course:  
 
 
ED Course as of Feb 04 1510 Thu Feb 04, 2021  
1228 Patient symptoms are nearly completely resolved at this point she is more awake and alert. Discussions with neurologist we do have concerns with regard to whether the patient had a stroke or seizure. Will be admitting for stroke work-up. [CS] ED Course User Index 
[CS] Bebeto Marino MD  
 
 
 
Provider Notes (Medical Decision Making): As above patient be admitted for stroke work-up. MDM Consultations:  
 
 
Consultations: -  Hospitalist Consultant: Dr. Jonas Starkey: We have asked for emergent assistance with regard to this patient. We have discussed the patients HPI, ROS, PE and results this far. They will come and evaluate the patient for admission. Procedures and Critical Care Performed by: Parveen Ferrell MD 
PROCEDURES: 
Procedures Disposition Disposition: Admitted to Floor Medical Floor the case was discussed with the admitting physician Admitted Diagnosis Clinical Impression: 1. TIA (transient ischemic attack) Attestations: 
 
Parveen Ferrell MD 
 
Please note that this dictation was completed with [employer], the computer voice recognition software. Quite often unanticipated grammatical, syntax, homophones, and other interpretive errors are inadvertently transcribed by the computer software. Please disregard these errors. Please excuse any errors that have escaped final proofreading. Thank you.

## 2021-02-04 NOTE — Clinical Note
Patient Class[de-identified] OBSERVATION [[id number]]   Type of Bed: Remote Telemetry [29]   Reason for Observation: TIA   Admitting Diagnosis: TIA (transient ischemic attack) [[id number]]   Admitting Physician: Shea Kwan [[id number]]   Attending Physician: Shea Kwan [[id number]]

## 2021-02-04 NOTE — ED TRIAGE NOTES
Pt arrives to ED following episode of unresponsiveness. Family stated pt stopped responding and vomiting multiple times. Per ems pt following some commands, Blood pressure high. Blood glucose 165.  States her name correctly and following commands upon ED arrival.

## 2021-02-05 PROBLEM — I63.9 STROKE (HCC): Status: ACTIVE | Noted: 2021-01-01

## 2021-02-05 NOTE — PROGRESS NOTES
SPEECH LANGUAGE PATHOLOGY BEDSIDE SWALLOW EVALUATIONS Patient: Alyson Martinez  (9 y.o. ) Date: 2/5/2021 Primary Diagnosis: TIA Precautions:  Fall ASSESSMENT : 
Patient alert, oriented x4, and follows basic commands. No facial droop or dysarthria noted. Informally speech language wfl. She presents w/ wfl oropharyngeal swallow fxn. Oral phase c/b slow but adequate bolus manipulation. Pharyngeal phase c/b timely swallow and HLE is wfl upon palpation. No overt s/s of pen/asp observed. Patient will benefit from skilled intervention to address the above impairments. Patients rehabilitation potential is considered to be Good PLAN : 
Recommendations and Planned Interventions: 
Rec cont regular/thin diet w/ strict asp/GERD precautions. Follow up x1 to ensure diet tolerance and speech language at baseline. Frequency/Duration: Patient will be followed by speech-language pathology 1 time a week to address goals. Discharge Recommendations: Home Health SUBJECTIVE:  
Patient denies dysphagia. OBJECTIVE:  
Patient admitted w/ difficulty w/ speech, vomiting and AMS Past Medical History:  
Diagnosis Date Hypercholesterolemia Hypertension Thyroid disease XR CHEST PORT Final Result Findings suggesting hydrostatic edema. CTA CODE NEURO HEAD AND NECK W CONT Final Result Addendum 1 of 1 Addendum: IMPRESSION:  
  
Nonspecific mild prominence of mediastinal lymph nodes, uncertain   
etiology. Correlate clinically for an underlying systemic/reactive process. Short-term  
follow-up with dedicated CT chest can be considered as clinically   
indicated  
given patient age. Final  
  
CT CODE NEURO HEAD WO CONTRAST Final Result No acute intracranial abnormality. Chronic changes as above. The findings were discussed with Dr. Gamal Sequeira by Dr. Faye Castañeda at [phone] hours on  
2/4/2021 via phone conversation. Diet prior to admission: Regular Current Diet:  DIET REGULAR Cognitive and Communication Status: 
Neurologic State: Alert, Confused Orientation Level: Oriented to person, Oriented to situation Cognition: Impaired decision making Perception: Appears intact Perseveration: No perseveration noted Safety/Judgement: Decreased awareness of need for safety, Decreased insight into deficits Swallowing Evaluation:  
Oral Assessment: 
Oral Assessment Labial: No impairment Dentition: Upper & lower dentures Oral Hygiene: wfl 
Lingual: No impairment Velum: No impairment Mandible: No impairment P.O. Trials: 
Patient Position: upright in bed Vocal quality prior to P.O.: No impairment Consistency Presented: Puree; Solid; Thin liquid How Presented: Self-fed/presented;SLP-fed/presented;Cup/sip;Straw;Successive swallows Bolus Acceptance: No impairment Bolus Formation/Control: No impairment Propulsion: No impairment Oral Residue: None Initiation of Swallow: No impairment Laryngeal Elevation: Functional 
Aspiration Signs/Symptoms: None Pharyngeal Phase Characteristics: No impairment, issues, or problems Oral Phase Severity: Minimal 
Pharyngeal Phase Severity : Minimal 
Voice: 
 
Vocal Quality: No impairment Pain: 
Pain Scale 1: Numeric (0 - 10) Pain Intensity 1: 0 After treatment:  
Patient left in no apparent distress in bed, Call bell within reach, and Nursing notified COMMUNICATION/EDUCATION:  
Patient receptive of education regarding s/s aspiration and aspiration precautions. The patient's plan of care including recommendations, planned interventions, and recommended diet changes were discussed with: Registered nurse. Patient/family have participated as able in goal setting and plan of care. Thank you for this referral. 
Anthony Hernandez M.S., M.Ed., CCC-SLP Time Calculation: 18 mins Problem: Dysphagia (Adult) Goal: *Acute Goals and Plan of Care (Insert Text) Description: Speech Therapy Swallow Goals Initiated 2/5/2021 
-Patient will tolerate regular diet with thin liquids without clinical indicators of aspiration given no cues within 7 day(s). [ ] Not met  [ ]  MET   [ ] Progressing  [ ] Jelena Quick 
-Patient will tolerate PO trials without clinical indicators of aspiration given no cues within 7 day(s). [ ] Not met  [ ]  MET   [ ] Progressing  [ ] Jelena Quick 
-Patient will demonstrate understanding of swallow safety precautions and aspiration precautions, diet recs with no cues within 7 day(s). [ ] Not met  [ ]  MET   [ ] Tena Brock  [ ] Jelena Quick Outcome: Initial

## 2021-02-05 NOTE — PROGRESS NOTES
Hospitalist Progress Note Subjective:  
Daily Progress Note: 2/5/2021 5:49 PM 
 
Hospital Course: 
Alyson Martinez is a 80 y.o. female who was brought to the hospital by EMS , reported by family was at home, and suddenly became less responsive and aphasic, after episode of vomiting. Family reports has been treated for pneumonia and was finishing antibiotic prescription. She was taking zofran before each dose of augmentin for nausea. Pt presented with slight aphasia and confusion , however, upon my assessment , pt was more alert and oriented, very [place] , following commands, speech clear and face is symmetrical. PMH includes carotid stenosis, hypertension,  
GERD, hypothyroidism, CAD, s/p right CEA in 2009 for 80% stenosis. On presentation, BP elevated 201/101, pt will be admitted for further evaluation and treatment. Subjective: Pt seen in the ED, pt complaints of nausea/no vomiting. Current Facility-Administered Medications Medication Dose Route Frequency  atorvastatin (LIPITOR) tablet 40 mg  40 mg Oral QHS  clopidogreL (PLAVIX) tablet 75 mg  75 mg Oral DAILY  promethazine (PHENERGAN) with saline injection 12.5 mg  12.5 mg IntraVENous Q6H PRN  
 bisacodyL (DULCOLAX) suppository 10 mg  10 mg Rectal DAILY PRN  
 [START ON 2/6/2021] polyethylene glycol (MIRALAX) packet 17 g  17 g Oral DAILY  senna-docusate (PERICOLACE) 8.6-50 mg per tablet 2 Tab  2 Tab Oral QHS  aspirin delayed-release tablet 81 mg  81 mg Oral DAILY  ergocalciferol capsule 50,000 Units  50,000 Units Oral Q7D  
 pantoprazole (PROTONIX) tablet 40 mg  40 mg Oral ACB  levothyroxine (SYNTHROID) tablet 88 mcg  88 mcg Oral ACB  loratadine (CLARITIN) tablet 10 mg  10 mg Oral DAILY  losartan (COZAAR) tablet 100 mg  100 mg Oral DAILY  0.9% sodium chloride infusion  50 mL/hr IntraVENous CONTINUOUS  
 acetaminophen (TYLENOL) tablet 650 mg  650 mg Oral Q4H PRN  
 ondansetron (ZOFRAN) injection 4 mg  4 mg IntraVENous Q6H PRN  
 melatonin tablet 3 mg  3 mg Oral QHS PRN  
 hydrOXYzine pamoate (VISTARIL) capsule 25 mg  25 mg Oral TID PRN  
 hydrALAZINE (APRESOLINE) tablet 10 mg  10 mg Oral BID PRN Current Outpatient Medications Medication Sig  esomeprazole (NexIUM) 20 mg capsule Take 20 mg by mouth daily.  valsartan (DIOVAN) 160 mg tablet Take 1 Tab by mouth daily.  ergocalciferol (ERGOCALCIFEROL) 50,000 unit capsule Take 1 Cap by mouth every seven (7) days.  loratadine (CLARITIN) 10 mg tablet Take 10 mg by mouth.  aspirin delayed-release 81 mg tablet Take  by mouth daily.  levothyroxine (SYNTHROID) 88 mcg tablet Take  by mouth Daily (before breakfast).  montelukast (SINGULAIR) 10 mg tablet Take 1 Tab by mouth daily. Review of Systems: 
 
Review of Systems Constitutional: Negative for chills and fever. HENT: Negative for congestion and sore throat. Respiratory: Negative for cough and shortness of breath. Cardiovascular: Negative for chest pain and palpitations. Gastrointestinal: Positive for nausea. Negative for abdominal pain, heartburn and vomiting. Genitourinary: Negative for dysuria and urgency. Neurological: Negative for dizziness and headaches. Objective:  
 
Visit Vitals BP (!) 169/75 Pulse 68 Temp 98.6 °F (37 °C) Resp 18 Ht 5' 4.02\" (1.626 m) Wt 68 kg (149 lb 14.6 oz) SpO2 99% BMI 25.72 kg/m² O2 Flow Rate (L/min): 2 l/min O2 Device: Room air Temp (24hrs), Av.6 °F (37 °C), Min:98.6 °F (37 °C), Max:98.6 °F (37 °C) No intake/output data recorded. No intake/output data recorded. PHYSICAL EXAM: 
 
Physical Exam  
Constitutional: She is oriented to person, place, and time. She appears well-developed. No distress. HENT:  
Right Ear: Decreased hearing is noted. Left Ear: Decreased hearing is noted. Eyes: Pupils are equal, round, and reactive to light. Neck: Normal range of motion. Neck supple.   
Cardiovascular: Normal rate and regular rhythm. Murmur heard. Pulmonary/Chest: Effort normal and breath sounds normal. No respiratory distress. Musculoskeletal: Normal range of motion. Neurological: She is alert and oriented to person, place, and time.  equal, speech clear, face symmetrical. 
No facial droop. Skin: Skin is warm and dry. Psychiatric: She has a normal mood and affect. Her behavior is normal 
 
Data Review Recent Results (from the past 24 hour(s)) ECHO ADULT COMPLETE Collection Time: 02/05/21 11:30 AM  
Result Value Ref Range Aortic Valve Systolic Mean Gradient 66.38 mmHg AoV VTI 98.20 cm Aortic valve mean velocity 319.00 cm/s Pulmonic Regurgitant End Max Velocity 482.00 cm/s AoV PG 93.00 mmHg Aortic Valve Area by Continuity of VTI 0.78 cm2 Aortic Valve Area by Continuity of Peak Velocity 0.78 cm2 IVSd 1.33 (A) 0.6 - 0.9 cm LVIDd 4.56 3.9 - 5.3 cm LVIDs 2.39 cm  
 LVOT d 1.90 cm Left Ventricular Outflow Tract Mean Gradient 4.00 mmHg LVOT VTI 27.10 cm LVOT VTI 27.10 cm Pulmonic Regurgitant End Max Velocity 133.00 cm/s LVOT Peak Gradient 7.00 mmHg LVPWd 1.35 (A) 0.6 - 0.9 cm  
 LV E' Septal Velocity 6.04 cm/s LV ED Vol A2C 94.80 cm3 BP EF 70.0 55 - 100 % LV ES Vol A2C 13.70 cm3 E/E' septal 18.71 LV Ejection Fraction MOD 2C 70.0 % LVOT SV 77.0 cm3 Pulmonic Regurgitant End Max Velocity 604.00 cm/s Mitral Valve Deceleration Sierra 4,390.00 mm/s2 Mitral Valve Deceleration Sierra 4,390.00 mm/s2 Mitral Valve E Wave Deceleration Time 268.00 ms Mitral Valve Pressure Half-time 87.00 ms MV A Randy 116.00 cm/s  
 MV E Randy 113.00 cm/s  
 MVA (PHT) 2.53 cm2  
 MV E/A 0.97 Pulmonic Regurgitant End Max Velocity 124.00 cm/s Pulmonic Valve Systolic Peak Instantaneous Gradient 6.00 mmHg Pulmonic Regurgitant End Max Velocity 75.40 cm/s Pulmonic Valve Systolic Peak Instantaneous Gradient 2.00 mmHg P Vein A Dur 85.00 ms  Pulmonary Vein \"A\" Wave Velocity 32.60 cm/s  
 Est. RA Pressure 3.00 mmHg RVIDd 3.68 cm RVSP 40.00 mmHg Tricuspid Valve Max Velocity 305.00 cm/s Triscuspid Valve Regurgitation Peak Gradient 37.00 mmHg Right Atrial Area 4C 13.90 cm2 LA Area 4C 25.47 cm2 LV Mass .5 67 - 162 g  
 LV Mass AL Index 137.2 43 - 95 g/m2 NORMA/BSA Pk Randy 0.5 cm2/m2 NORMA/BSA VTI 0.5 cm2/m2 XR CHEST PORT Final Result Findings suggesting hydrostatic edema. CTA CODE NEURO HEAD AND NECK W CONT Final Result Addendum 1 of 1 Addendum: IMPRESSION:  
  
Nonspecific mild prominence of mediastinal lymph nodes, uncertain   
etiology. Correlate clinically for an underlying systemic/reactive process. Short-term  
follow-up with dedicated CT chest can be considered as clinically   
indicated  
given patient age. Final  
  
CT CODE NEURO HEAD WO CONTRAST Final Result No acute intracranial abnormality. Chronic changes as above. The findings were discussed with Dr. Isreal Lewis by Dr. Urvashi Raphael at [phone] hours on  
2/4/2021 via phone conversation. Active Problems: 
  TIA (transient ischemic attack) (2/4/2021) Stroke [facility]) (2/5/2021) Assessment/Plan: 1. 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  
Continue statin, asa 
 
2. Hypertensive urgency- will add prn hydralazine for greater than 180 , Continue with home medication 
  
3. CAP- treated with abx, CXR showing some hydrostatic edema 
  
4. Generalized weakness- OT/PT/speech consults. 5. Aortic stenosis-found on echo, EF 78%, start daily lasix 40 mg, cardiology consulted, Outpatient evaluation after discharge. DVT Prophylaxis: sequential compression Code Status: 
Full Code POA: 
 
Care Plan discussed with:  
_______patient, family , neurology, cardiology , staff nurse________________________________________________________ Ambrose Benton, NP

## 2021-02-05 NOTE — ROUTINE PROCESS
TRANSFER - OUT REPORT: 
 
Verbal report given to Azucena Montes (name) on Rip Aaronlder  being transferred to  (unit) for routine progression of care Report consisted of patients Situation, Background, Assessment and  
Recommendations(SBAR). Information from the following report(s) SBAR was reviewed with the receiving nurse. Lines:  
Peripheral IV 02/05/21 Left Antecubital (Active) Site Assessment Clean, dry, & intact 02/05/21 1362 Phlebitis Assessment 0 02/05/21 0943 Infiltration Assessment 0 02/05/21 0943 Dressing Status Clean, dry, & intact 02/05/21 2472 Dressing Type Transparent 02/05/21 8050 Hub Color/Line Status Blue;Flushed 02/05/21 1667 Alcohol Cap Used Yes 02/05/21 0181 Opportunity for questions and clarification was provided. Patient transported with: 
 Monitor Tech

## 2021-02-05 NOTE — CONSULTS
19 [facility] CARDIOLOGY CONSULTATION 
 
REASON FOR CONSULT: Severe AS 
 
REQUESTING PROVIDER: Socrates Falling CHIEF COMPLAINT:  CVA WEAKNESS HISTORY OF PRESENT ILLNESS:  Mat David is a 80y.o. year-old female with past medical history significant for Carotid Stenosis s/p Right CEA 2009, HTN, GERD, Thyroid Disease who was evaluated today due to severe AS. Patient presented to The ED with new onset decreased responsiveness and aphasia. With hypertensive emergency as well. Patient seen and examined. She is sleeping but wakes to voice she is hard of hearing and disoriented to time. Daughter at bedside. She reports recent admission to [facility] for cough and treated for pneumonia/ congestive heart failure. At home she can walk around her house without symptoms. Denies chest pain, dyspnea, syncope, palpitations, nausea or vomiting. EKG on admission shows NSR with Nonspecific ST and T wave abnormality Records from hospital admission course thus far reviewed. INPATIENT MEDICATIONS:  Home medications reviewed. Current Facility-Administered Medications:  
  atorvastatin (LIPITOR) tablet 40 mg, 40 mg, Oral, QHS, Klever Holloway MD 
  clopidogreL (PLAVIX) tablet 75 mg, 75 mg, Oral, DAILY, Berna Anne MD, 75 mg at 02/05/21 0177   promethazine (PHENERGAN) with saline injection 12.5 mg, 12.5 mg, IntraVENous, Q6H PRN, Alvarez Monaco NP 
  bisacodyL (DULCOLAX) suppository 10 mg, 10 mg, Rectal, DAILY PRN, Alvarez Monaco NP 
  [START ON 2/6/2021] polyethylene glycol (MIRALAX) packet 17 g, 17 g, Oral, DAILY, Alvarez Monaco NP 
  senna-docusate (PERICOLACE) 8.6-50 mg per tablet 2 Tab, 2 Tab, Oral, QHS, Alvarez Monaco NP 
  aspirin delayed-release tablet 81 mg, 81 mg, Oral, DAILY, Alvarez Monaco NP, 81 mg at 02/05/21 6425   ergocalciferol capsule 50,000 Units, 50,000 Units, Oral, Q7D, Alvarez Monaco NP, 50,000 Units at 02/04/21 1729 
  pantoprazole (PROTONIX) tablet 40 mg, 40 mg, Oral, ACB, Zarefoss, Alvarez A, NP, 40 mg at 02/05/21 1015   levothyroxine (SYNTHROID) tablet 88 mcg, 88 mcg, Oral, ACB, Zarefoss, Alvarez A, NP, 88 mcg at 02/05/21 1015   loratadine (CLARITIN) tablet 10 mg, 10 mg, Oral, DAILY, Zarefoss, Alvarez A, NP, 10 mg at 02/05/21 3857   losartan (COZAAR) tablet 100 mg, 100 mg, Oral, DAILY, Zarefoss, Alvarez A, NP, 100 mg at 02/05/21 0907 
  0.9% sodium chloride infusion, 50 mL/hr, IntraVENous, CONTINUOUS, Zarefoss, Alvarez A, NP, Last Rate: 50 mL/hr at 02/05/21 1317, 50 mL/hr at 02/05/21 1317   acetaminophen (TYLENOL) tablet 650 mg, 650 mg, Oral, Q4H PRN, [facility] Al[facility] A, NP 
  ondansetron [facility]) injection 4 mg, 4 mg, IntraVENous, Q6H PRN, Zarefoss, Alvarez A, NP, 4 mg at 02/05/21 0036 
  melatonin tablet 3 mg, 3 mg, Oral, QHS PRN, Zarefoss, Alvarez A, NP 
  hydrOXYzine pamoate (VISTARIL) capsule 25 mg, 25 mg, Oral, TID PRN, Zarefoss, Alvarez A, NP, 25 mg at 02/04/21 2111   hydrALAZINE (APRESOLINE) tablet 10 mg, 10 mg, Oral, BID PRN, [facility], 10 mg at 02/04/21 1729 Current Outpatient Medications:  
  esomeprazole (NexIUM) 20 mg capsule, Take 20 mg by mouth daily. , Disp: , Rfl:  
  valsartan (DIOVAN) 160 mg tablet, Take 1 Tab by mouth daily. , Disp: 30 Tab, Rfl: 1   ergocalciferol (ERGOCALCIFEROL) 50,000 unit capsule, Take 1 Cap by mouth every seven (7) days. , Disp: 12 Cap, Rfl: 0 
  loratadine (CLARITIN) 10 mg tablet, Take 10 mg by mouth., Disp: , Rfl:  
  aspirin delayed-release 81 mg tablet, Take  by mouth daily. , Disp: , Rfl:  
  levothyroxine (SYNTHROID) 88 mcg tablet, Take  by mouth Daily (before breakfast). , Disp: , Rfl:  
  montelukast (SINGULAIR) 10 mg tablet, Take 1 Tab by mouth daily. , Disp: 30 Tab, Rfl: 0 ALLERGIES:  Allergies reviewed with the patient,No Known Allergies . REVIEW OF SYSTEMS:  Complete review of systems performed, pertinents noted above, all other systems are negative.  
 
 
PHYSICAL EXAMINATION:  Vital sign assessment reveal a blood pressure of  169/75 and pulse rate of 68. Cardiovascular exam has a heart with a regular rate and rhythm, normal S1 and S2. Harsh murmur present. There are no rubs or gallops. Good peripheral pulses. No jugular venous distension. Respiratory exam reveals clear lung fields, no rales or rhonchi. Gastrointestinal exam has soft, nontender abdomen with normal bowel sounds. Lymphatic exam reveals no edema and no varicosities. No notable skin changes. Neurologic exam is nonfocal.  Musculoskeletal exam is notable for a normal gait. Recent labs results and imaging reviewed. Notable findings include Lab Results Component Value Date/Time WBC 11.1 (H) 02/04/2021 11:30 AM  
 HGB 13.2 02/04/2021 11:30 AM  
 HCT 39.3 02/04/2021 11:30 AM  
 PLATELET 552 21/60/7240 11:30 AM  
 MCV 99.7 (H) 02/04/2021 11:30 AM  
 
Lab Results Component Value Date/Time CK 37 05/27/2009 04:15 AM  
 CK - MB 0.4 05/27/2009 04:15 AM  
 CK-MB Index 1.1 05/27/2009 04:15 AM  
  
No results found for: BNP, BNPP, BNPPPOC, XBNPT, BNPNT Lab Results Component Value Date/Time Sodium 140 02/04/2021 11:30 AM  
 Potassium 3.9 02/04/2021 11:30 AM  
 Chloride 106 02/04/2021 11:30 AM  
 CO2 29 02/04/2021 11:30 AM  
 Anion gap 5 02/04/2021 11:30 AM  
 Glucose 126 (H) 02/04/2021 11:30 AM  
 BUN 17 02/04/2021 11:30 AM  
 Creatinine 1.07 (H) 02/04/2021 11:30 AM  
 BUN/Creatinine ratio 16 02/04/2021 11:30 AM  
 GFR est AA 58 (L) 02/04/2021 11:30 AM  
 GFR est non-AA 48 (L) 02/04/2021 11:30 AM  
 Calcium 9.2 02/04/2021 11:30 AM  
 Bilirubin, total 0.5 02/04/2021 11:30 AM  
 ALT (SGPT) 24 02/04/2021 11:30 AM  
 Alk. phosphatase 116 02/04/2021 11:30 AM  
 Protein, total 7.2 02/04/2021 11:30 AM  
 Albumin 3.3 (L) 02/04/2021 11:30 AM  
 Globulin 3.9 02/04/2021 11:30 AM  
 A-G Ratio 0.8 (L) 02/04/2021 11:30 AM  
  . Echocardiogram 2/5/2021 · LV: Calculated LVEF is 70%. Normal cavity size and systolic function (ejection fraction normal). Mild concentric hypertrophy. Moderate (grade 2) left ventricular diastolic dysfunction. · Saline contrast was given to evaluate for intracardiac shunt. Negative bubble study. · LA: Severely dilated left atrium. · RV: Mildly dilated right ventricle. · AV: Severe aortic valve stenosis is present, valve area 0.78. 
· MV: Mild mitral valve regurgitation is present. · TV: Mild tricuspid valve regurgitation is present. Discussed case with Dr. Blanche Olivarez and our impression and recommendations are as follows: 1. Severe Aortic stenosis: Reports no previous history. Monitor volume status closely. Will start 40mg lasix po daily. Will need outpatient follow up for future work up. 2. Hypertensive emergency: Resume home medication (valsartan 160mg daily) and agree with hydralazine PRN. 3. TIA: per neuro. . On aspirin and plavix. 4. Carotid stenosis: per CTA  History of left CEA. Left-sided carotid stent without hemodynamically significant luminal Stenosis. Right carotid atherosclerosis causing 40-50% luminal stenosis of the lef ICA. Continue DAPT and statin. Thank you for involving us in the care of this patient. Please do not hesitate to call me or Dr. Blanche Olivarez  if additional questions arise.

## 2021-02-05 NOTE — PROGRESS NOTES
OCCUPATIONAL THERAPY EVALUATION Patient: Peggy Santos (75 y.o. female) Date: 2/5/2021 Primary Diagnosis: TIA (transient ischemic attack) [G45.9] Stroke [facility]) [I63.9] Precautions: Fall risk ASSESSMENT Pt is a 79 y/o F with PMH of carotid stenosis s/p right CEA 2009, HTN, HL, Thyroid Disease, GERD who presented to [facility] ED with decreased responsiveness and difficulty getting words out after episode of vomiting per family. Notes indicate that family reports pt has been getting treated for pneumonia with antibiotics which she was finishing. She was also taking zofran for associated nausea. Initial CT head 2/4/21 (-) for acute intracranial abnormality. Pt admitted 2/4/21 and being treated for TIA. Pt received semi-supine in bed upon arrival, on 2L O2 via NC, AXO to self and place, disoriented to time/situation, and agreeable to OT/PT evaluations at this time. Pt's daughter also present at bedside. Per pt/daughter, pt lives with other daughter and son in law in a two-story home (1st floor setup) with 5 GEMA and B HR from exterior. At baseline, pt reports being Mod I-IND with ADLs (sponge bathing) and Mod I using SPC for ambulation. Pt denies wearing any O2 at home. Per daughter, pt has 24/7 care at home as daughter is home during the daytime and brother in law is currently working from home. Based on current observations, pt presents with deficits in generalized strength/AROM, static/dynamic sitting balance, static/dynamic standing balance, functional activity tolerance, sensation, cognition, and increased pain impacting overall performance of ADLs and functional transfers/mobility. Pt currently requires total A to don socks at bed level, mod A for rolling, mod A x2 for supine>sit EOB with continued min A for sitting balance during ADL. Max A x2 required to scoot towards EOB 2' to reports of hip pain.  Once seated EOB, pt participates in basic grooming (washing face) s/p setup of items with min A for sitting balance and pt then sit><stand from EOB RW and min A x2. Pt reports need to urinate upon standing with accident on floor despite having purewick in. Pt side steps x2 with continued min A to to [facility], cleaned as needed (legs/feet) and socks changed with total A EOB. Pt dons clean gown with mod A to guide around wires/bring over shoulders. Returned to supine with mod A x2 at end of evaluation. O2 sats remained in 90's. Overall, pt tolerated session fair today. Pt would benefit from continued skilled OT services during hospital stay and at next level of care to address current impairments and improve overall IND and safety with self cares and functional mobility upon d/c. OT currently recommending IRF vs. [facility] with family assist at d/c pending progress. Other factors to consider for discharge: Family support, DME, time since onset, severity of deficits Patient will benefit from skilled therapy intervention to address the above noted impairments. PLAN : 
Recommendations and Planned Interventions: self care training, functional mobility training, therapeutic exercise, balance training, therapeutic activities, cognitive retraining, endurance activities, patient education, home safety training, and family training/education Frequency/Duration: Patient will be followed by occupational therapy 5 times a week to address goals. Recommendation for discharge: (in order for the patient to meet his/her long term goals) IRF vs. [facility] with family assist  
 
This discharge recommendation: 
Has been made in collaboration with the attending provider and/or case management IF patient discharges home will need the following DME: TBD SUBJECTIVE:  
Patient stated your touch is decreased on this (left) side OBJECTIVE DATA SUMMARY:  
HISTORY:  
Past Medical History:  
Diagnosis Date Hypercholesterolemia Hypertension Thyroid disease Past Surgical History:  
Procedure Laterality Date HX HYSTERECTOMY Expanded or extensive additional review of patient history:  
 
Home Situation Home Environment: Private residence # Steps to Enter: 5 Rails to Enter: Yes Hand Rails : Bilateral 
One/Two Story Residence: Two story, live on 1st floor Living Alone: No 
Support Systems: Family member(s)(Daughter and son in law) Current DME Used/Available at Home: Cane, straight Hand dominance: Right EXAMINATION OF PERFORMANCE DEFICITS: 
Cognitive/Behavioral Status: 
Neurologic State: Alert;Confused Orientation Level: Oriented to person;Oriented to place; Disoriented to time;Disoriented to situation Cognition: Follows commands;Memory loss Perception: Appears intact Perseveration: No perseveration noted Hearing: 
 TALITA [facility] Vision/Perceptual: WFL Range of Motion: 
AROM: Generally decreased, functional 
 
Strength: 
Strength: Generally decreased, functional 
 Grossly 3+/5 Coordination: 
Coordination: Generally decreased, functional 
Fine Motor Skills-Upper: Comment(Grossly decreased, functional) Tone & Sensation: 
Tone: Normal 
Sensation: Impaired(Per pt, decreased sensation L UE) Balance: 
Sitting: Impaired; With support Sitting - Static: Fair (occasional) Sitting - Dynamic: Poor (constant support) Standing: Impaired; With support Standing - Static: Fair;Constant support Standing - Dynamic : Fair;Constant support Functional Mobility and Transfers for ADLs: 
Bed Mobility: 
Rolling: Moderate assistance Supine to Sit: Moderate assistance;Assist x2 Sit to Supine: Moderate assistance;Assist x2 Scooting: Maximum assistance;Assist x2 Transfers: 
Sit to Stand: Minimum assistance;Assist x2 Stand to Sit: Minimum assistance;Assist x2 ADL Intervention and task modifications: 
Feeding Drink to Mouth: Set-up Grooming Grooming Assistance: Minimum assistance Position Performed: Seated edge of bed Washing Face: Set-up; Minimum assistance Upper Body Dressing Assistance Hospital Gown: Moderate assistance Lower Body Dressing Assistance Socks: Total assistance (dependent) Position Performed: Long sitting on bed;Seated edge of bed Therapeutic Exercise: Pt would benefit from UE HEP to improve overall UE AROM/strength and can be further educated in next treatment session. Functional Measure: 
 
[facility] AM-PACTM \"6 Clicks\" Daily Activity Inpatient Short Form How much help from another person does the patient currently need. .. Total; A Lot A Little None 1. Putting on and taking off regular lower body clothing? []  1 [x]  2 []  3 []  4  
2. Bathing (including washing, rinsing, drying)? []  1 [x]  2 []  3 []  4  
3. Toileting, which includes using toilet, bedpan or urinal? [] 1 [x]  2 []  3 []  4  
4. Putting on and taking off regular upper body clothing? []  1 [x]  2 []  3 []  4  
5. Taking care of personal grooming such as brushing teeth? []  1 []  2 [x]  3 []  4  
6. Eating meals? []  1 []  2 [x]  3 []  4  
© 2007, Trustees of [facility], under license to [employer]. All rights reserved Score: 14/24 Interpretation of Tool:  Represents clinically-significant functional categories (i.e. Activities of daily living). Percentage of Impairment CH 
 
0% 
 CI 
 
1-19% CJ 
 
20-39% CK 
 
40-59% CL 
 
60-79% CM 
 
80-99% CN  
 
100% [employer] [facility] Score 6-24 24 23 20-22 15-19 10-14 7-9 6 Occupational Therapy Evaluation Charge Determination History Examination Decision-Making LOW Complexity : Brief history review  MEDIUM Complexity : 3-5 performance deficits relating to physical, cognitive , or psychosocial skils that result in activity limitations and / or participation restrictions MEDIUM Complexity : Patient may present with comorbidities that affect occupational performnce.  Miniml to moderate modification of tasks or assistance (eg, physical or verbal ) with assesment(s) is necessary to enable patient to complete evaluation Based on the above components, the patient evaluation is determined to be of the following complexity level: LOW Pain Rating: 
Pt initially reports 0/10 at rest, c/o L hip pain with mobility not formally rated at this time. Activity Tolerance:  
Fair Please refer to the flowsheet for vital signs taken during this treatment. After treatment patient left in no apparent distress:   
Supine in bed, Call bell within reach, Caregiver / family present, and Side rails x 3 
 
COMMUNICATION/EDUCATION:  
The patients plan of care was discussed with: Physical therapist and Registered nurse. Patient/family have participated as able in goal setting and plan of care. and Patient/family agree to work toward stated goals and plan of care. This patients plan of care is appropriate for delegation to [facility]. OT/PT sessions occurred together for increased patient and clinician safety as pt requires A of 2 for mobility at this time. Thank you for this referral. 
Adi Genao Time Calculation: 43 mins Problem: Self Care Deficits Care Plan (Adult) Goal: *Acute Goals and Plan of Care (Insert Text) Description: Pt will be SBA sup <> sit in prep for EOB ADLs Pt will be SBA grooming sitting EOB Pt will be min A LE dressing sitting EOB/long sit Pt will be SBA sit <>  prep for toileting LRAD Pt will be SBA toileting/toilet transfer/cloth mgmt LRAD Pt will be Mod I following UE HEP in prep for self care tasks Outcome: Not Met

## 2021-02-05 NOTE — PROGRESS NOTES
PHYSICAL THERAPY EVALUATION Patient: Madeline Hahn (89 y.o. female) Date: 2/5/2021 Primary Diagnosis: TIA (transient ischemic attack) [G45.9] Stroke [facility]) [I63.9] Precautions: falls ASSESSMENT This is a 79 y/o female came to  [facility]  with c/o becoming less responsive  and aphasic, after episode of vomiting. Patient's family reported that she acutely stopped talking and has been constant ever since , admitted on 2/4/21 for TIA  . CT head negative acute intracranial abnormality , showing chronic left frontal lobe encephalomalacia, chronic small vessel ischemic changes Pt. Received in semi-supine , agreeable for PT/OT eval. Pt is A& O x self and place ,disoriented to  time and situation , as per pt. and daughter's report , pt lives with her daughter and son- in law  in a 2 story home ( stays on first floor ) with 5 steps to enter , HR on bilateral sides ( wide ) ,  IND with ADL's and mod I  for functional transfers/mobility with SPC  prior to admission . Based on the objective data described below, currently pt on 2 L O2 ,  the patient presents with confusion , [place] , decreased strength , 2/5  grossly in  b/l LE , pain in L hip on movement , unable to rate it ,  at rest - 0/10 , decreased sensation on L Le and UE,  balance deficits , generalized weakness , decreased safety awareness , decreased activity tolerance and need for increased assist with functional mobility ( needs modA for rolling from side to side  , modAx2  for supine <>sit transfers , fair static sitting balance ,  Danielle x 2 for sit <>stand, Danielle   for static  standing balance . Pt reports need to urinate upon standing with accident on floor despite having purewick in. Pt  cleaned as needed (legs/feet) and socks changed with total A  , able to take few side steps -4' with RW , Danielle (limited ambulation sec to connected to IV on the bed )  .  Recommend d/c to IRF vs home with  HHPT  with family assist pending progress   when medically appropriate . Current Level of Function Impacting Discharge (mobility/balance): Requires min/modA for functional mobility Functional Outcome Measure: The patient scored 11 on the AM PAC basic mobility  outcome measure which is indicative of medium complexity . Other factors to consider for discharge: time since onset , severity of deficits Patient will benefit from skilled therapy intervention to address the above noted impairments. PLAN : 
Recommendations and Planned Interventions: bed mobility training, transfer training, gait training, therapeutic exercises, neuromuscular re-education, patient and family training/education, and therapeutic activities Frequency/Duration: Patient will be followed by physical therapy:  5 times a week to address goals. Recommendation for discharge: (in order for the patient to meet his/her long term goals) IRF vs HHPT with family assist pending progress This discharge recommendation: 
Has been made in collaboration with the attending provider and/or case management IF patient discharges home will need the following DME: rolling walker SUBJECTIVE:  
Patient stated  I feel weak  OBJECTIVE DATA SUMMARY:  
HISTORY:   
Past Medical History:  
Diagnosis Date  Hypercholesterolemia  Hypertension  Thyroid disease Past Surgical History:  
Procedure Laterality Date  HX HYSTERECTOMY Personal factors and/or comorbidities impacting plan of care:  
Home Situation Home Environment: Private residence # Steps to Enter: 5 Rails to Enter: Yes Hand Rails : Bilateral 
One/Two Story Residence: Two story, live on 1st floor Living Alone: No 
Support Systems: Family member(s)(Daughter and son in law) Current DME Used/Available at Home: Cane, straight PLOF: Pt IND for ADLS/IADLS, mod I with mobility prior to admission.   
 
EXAMINATION/PRESENTATION/DECISION MAKING:  
Critical Behavior: 
Neurologic State: Alert, Confused Orientation Level: Oriented to person, Oriented to place, Disoriented to time, Disoriented to situation Cognition: Follows commands, Memory loss Safety/Judgement: Decreased awareness of need for safety, Decreased insight into deficits Hearing: Auditory Auditory Impairment: Hard of hearing, bilateral 
Skin:  intact where exposed Range Of Motion: 
AROM: Generally decreased, functional 
 
Strength:   
Strength: Generally decreased, functional(-2/5 grossly) Tone & Sensation:  
Tone: Normal 
 
Sensation: Impaired(decreased sensation on L LE) Coordination: 
Coordination: Generally decreased, functional 
 
Functional Mobility: 
Bed Mobility: 
Rolling: Moderate assistance Supine to Sit: Moderate assistance;Assist x2 Sit to Supine: Moderate assistance;Assist x2 Scooting: Maximum assistance;Assist x2 Transfers: 
Sit to Stand: Minimum assistance;Assist x2 Stand to Sit: Minimum assistance;Assist x2 Balance:  
Sitting: Impaired; With support Sitting - Static: Fair (occasional) Sitting - Dynamic: Poor (constant support) Standing: Impaired; With support Standing - Static: Fair Standing - Dynamic : Fair Ambulation/Gait Training: 
Distance (ft): 4 Feet (ft) Assistive Device: Walker, rolling Ambulation - Level of Assistance: Minimal assistance Functional Measure: 
 
[address] [address] AM-PAC 6 Clicks Basic Mobility Inpatient Short Form How much difficulty does the patient currently have. .. Unable A Lot A Little None 1. Turning over in bed (including adjusting bedclothes, sheets and blankets)? [] 1   [x] 2   [] 3   [] 4  
2. Sitting down on and standing up from a chair with arms ( e.g., wheelchair, bedside commode, etc.)   [] 1   [x] 2   [] 3   [] 4  
3. Moving from lying on back to sitting on the side of the bed? [] 1   [x] 2   [] 3   [] 4 How much help from another person does the patient currently need. .. Total A Lot A Little None 4.   Moving to and from a bed to a chair (including a wheelchair)? [] 1   [x] 2   [] 3   [] 4  
5. Need to walk in hospital room? [] 1   [x] 2   [] 3   [] 4  
6. Climbing 3-5 steps with a railing? [x] 1   [] 2   [] 3   [] 4  
© 2007, Trustees of [address] [place] [address] [address] [id number], under license to [employer]. All rights reserved Score:  Initial: 11 Most Recent: X (Date: 2/5/21-- ) Interpretation of Tool:  Represents activities that are increasingly more difficult (i.e. Bed mobility, Transfers, Gait). Score 24 23 22-20 19-15 14-10 9-7 6 Modifier CH CI CJ CK CL CM CN Physical Therapy Evaluation Charge Determination History Examination Presentation Decision-Making MEDIUM  Complexity : 1-2 comorbidities / personal factors will impact the outcome/ POC  MEDIUM Complexity : 3 Standardized tests and measures addressing body structure, function, activity limitation and / or participation in recreation  LOW Complexity : Stable, uncomplicated  Other Functional Measure [facility] 6 medium complexity Based on the above components, the patient evaluation is determined to be of the following complexity level: LOW Pain Rating: 
 Pain in L hip on movement, unable to rate it Activity Tolerance:  
Fair Please refer to the flowsheet for vital signs taken during this treatment. After treatment patient left in no apparent distress:  
Supine in bed, Call bell within reach, and Caregiver / family present COMMUNICATION/EDUCATION:  
The patients plan of care was discussed with: Occupational therapist and Registered nurse. Fall prevention education was provided and the patient/caregiver indicated understanding. and Patient/family agree to work toward stated goals and plan of care. Problem: Mobility Impaired (Adult and Pediatric) Goal: *Acute Goals and Plan of Care (Insert Text) Description: Pt will be I with LE HEP in 7 days. Pt will perform bed mobility independently  in 7 days. Pt will perform transfers with mod I in 7 days. Pt will amb >150 feet with LRAD safely with mod I in 7 days. Outcome: Not Met Coeval and treat with OT for increased pt's safety and maximum functional outcome d/t decreased activity tolerance. Thank you for this referral. 
Marcos Holm Time Calculation: 43 mins

## 2021-02-06 NOTE — PROGRESS NOTES
PHYSICAL THERAPY TREATMENT Patient: Cesar Amaya (4 y.o. female) Date: 2/6/2021 Diagnosis: TIA (transient ischemic attack) [G45.9] Stroke [facility]) [I63.9] <principal problem not specified> Precautions:   
Chart, physical therapy assessment, plan of care and goals were reviewed. ASSESSMENT Patient continues with skilled PT services and is progressing towards goals. Pt semi-supine in bed upon arrival and agreeable to PT. Pt transferred to EOB with Min A TE preformed on EOB see flow sheet below for exercises. STS Min A pt amb ~25 ft with RW, gt belt, and CGA/Min A. Pt amb back to bedside chair and transferred with Min A. Pt left seated in bedside chair with chair alarm activated and needs in place. Current Level of Function Impacting Discharge (mobility/balance): Decreased mobility and balance compared to baseline. Other factors to consider for discharge: Decreased mobility and balance compared to baseline. PLAN : 
Patient continues to benefit from skilled intervention to address the above impairments. Continue treatment per established plan of care. to address goals. Recommendation for discharge: (in order for the patient to meet his/her long term goals) To be determined: HHPT vs IRF. Family in room requesting IRF stating pt has functionally declined recently. This discharge recommendation: 
Has been made in collaboration with the attending provider and/or case management IF patient discharges home will need the following DME: to be determined (TBD) SUBJECTIVE:  
Patient stated i'm in no pain.  OBJECTIVE DATA SUMMARY:  
Critical Behavior: 
Neurologic State: Alert, Eyes open spontaneously Orientation Level: Oriented X4 Cognition: Appropriate for age attention/concentration, Poor safety awareness, Follows commands Safety/Judgement: Decreased awareness of need for safety, Decreased insight into deficits Functional Mobility Training: 
Bed Mobility: 
Rolling: Minimum assistance Supine to Sit: Minimum assistance Sit to Supine: Minimum assistance Scooting: Minimum assistance Transfers: 
Sit to Stand: Minimum assistance Stand to Sit: Minimum assistance Balance: 
Sitting: Intact; With support Sitting - Static: Fair (occasional) Sitting - Dynamic: Fair (occasional) Standing: Impaired; With support Standing - Static: Fair Standing - Dynamic : Fair Ambulation/Gait Training: 
Distance (ft): 25 Feet (ft) Assistive Device: Walker, rolling Ambulation - Level of Assistance: Minimal assistance Stairs: Therapeutic Exercises:  
Therapeutic Exercises:  
 
 
EXERCISE Sets Reps Active Active Assist  
Passive Self ROM Comments Ankle Pumps   [x] [] [] [] X10 B LE  
Quad Sets/Glut Sets   [] [] [] [] Hamstring Sets   [] [] [] [] Short Arc Quads   [] [] [] [] Heel Slides   [] [] [] [] Straight Leg Raises   [] [] [] [] Hip abd/add   [] [] [] [] Long Arc Quads   [x] [] [] [] X10 B LE  
Marching   [x] [] [] [] X10 B LE  
   [] [] [] []   
  
Pain Ratin/10 Activity Tolerance:  
Fair and requires rest breaks Please refer to the flowsheet for vital signs taken during this treatment. After treatment patient left in no apparent distress:  
Sitting in chair, Call bell within reach, and Bed / chair alarm activated COMMUNICATION/COLLABORATION:  
The patients plan of care was discussed with: Registered nurse. Mary Ramires PTA Time Calculation: 29 mins

## 2021-02-06 NOTE — PROGRESS NOTES
CARDIOLOGY PROGRESS NOTE Patient seen and examined. This is a patient who is followed for severe AS. Patient was awake and alert, has some hearing deficits but is able to answer questions appropriately. Denies chest pain or discomfort. Shortness of breath with exertion. No other complaints reported. Family member at bedside. Telemetry reviewed, there were no events noted in the past 24 hours. SR with PACs Pertinent review of systems items noted above, all other systems are negative. Current medications reviewed. Physical Examination Vital signs are stable. Blood pressure 157/77, Pulse 88 No apparent distress. Heart is regular, rate and rhythm. Normal S1, S2, no murmurs are appreciated. Lungs are clear bilaterally. Abdomen is soft, nontender, normal bowel sounds. Extremities have no edema. Labs reviewed:  
 
Echocardiogram 2/5/2021 · LV: Calculated LVEF is 70%. Normal cavity size and systolic function (ejection fraction normal). Mild concentric hypertrophy. Moderate (grade 2) left ventricular diastolic dysfunction. · Saline contrast was given to evaluate for intracardiac shunt. Negative bubble study. · LA: Severely dilated left atrium. · RV: Mildly dilated right ventricle. · AV: Severe aortic valve stenosis is present, valve area 0.78. 
· MV: Mild mitral valve regurgitation is present. · TV: Mild tricuspid valve regurgitation is present. Case discussed with Dr. Ashlyn Fish and our impression and recommendations are as follows: 1. Severe Aortic stenosis: Reports no previous history. Monitor volume status closely. Continue 40mg lasix po daily. Strict I&Os. Will need outpatient follow up for future work up. 2. Hypertensive emergency: Resume home medication (valsartan 160mg daily) and agree with hydralazine PRN. Continue to monitor. 3. TIA: per neuro. On aspirin and plavix. Continue to monitor for bleeding. 4. Carotid stenosis: per CTA  History of left CEA.  Left-sided carotid stent without hemodynamically significant luminal Stenosis. Right carotid atherosclerosis causing 40-50% luminal stenosis of the lef ICA. Continue DAPT and statin. Please do not hesitate to call me or Dr. Elías Hall if additional questions arise.

## 2021-02-06 NOTE — PROGRESS NOTES
Problem: Falls - Risk of 
Goal: *Absence of Falls Description: Document Starleen Freeze Fall Risk and appropriate interventions in the flowsheet. Outcome: Progressing Towards Goal 
Note: Fall Risk Interventions: 
Mobility Interventions: OT consult for ADLs, PT Consult for mobility concerns, Bed/chair exit alarm Medication Interventions: Bed/chair exit alarm Elimination Interventions: Call light in reach, Bed/chair exit alarm Problem: Patient Education: Go to Patient Education Activity Goal: Patient/Family Education Outcome: Progressing Towards Goal 
  
Problem: Patient Education: Go to Patient Education Activity Goal: Patient/Family Education Outcome: Progressing Towards Goal 
  
Problem: Patient Education: Go to Patient Education Activity Goal: Patient/Family Education Outcome: Progressing Towards Goal 
  
Problem: Patient Education: Go to Patient Education Activity Goal: Patient/Family Education Outcome: Progressing Towards Goal 
  
Problem: Pressure Injury - Risk of 
Goal: *Prevention of pressure injury Description: Document Prateek Scale and appropriate interventions in the flowsheet. Outcome: Progressing Towards Goal 
Note: Pressure Injury Interventions: 
Sensory Interventions: Assess changes in LOC, Assess need for specialty bed, Keep linens dry and wrinkle-free, Maintain/enhance activity level, Minimize linen layers Moisture Interventions: Absorbent underpads, Apply protective barrier, creams and emollients, Minimize layers, Maintain skin hydration (lotion/cream) Activity Interventions: Assess need for specialty bed, PT/OT evaluation, Increase time out of bed Mobility Interventions: HOB 30 degrees or less, PT/OT evaluation, Turn and reposition approx. every two hours(pillow and wedges) Nutrition Interventions: Document food/fluid/supplement intake, Offer support with meals,snacks and hydration Friction and Shear Interventions: Apply protective barrier, creams and emollients, HOB 30 degrees or less, Minimize layers Problem: Patient Education: Go to Patient Education Activity Goal: Patient/Family Education Outcome: Progressing Towards Goal

## 2021-02-06 NOTE — PROGRESS NOTES
Hospitalist Progress Note Subjective:  
Daily Progress Note: 2/6/2021 11:35 AM 
 
Hospital Course: 
Sky Morel is a 80 y.o. female who was brought to the hospital by EMS , reported by family was at home, and suddenly became less responsive and aphasic, after episode of vomiting. Family reports has been treated for pneumonia and was finishing antibiotic prescription. She was taking zofran before each dose of augmentin for nausea. Pt presented with slight aphasia and confusion , however, upon my assessment , pt was more alert and oriented, very [place] , following commands, speech clear and face is symmetrical. PMH includes carotid stenosis, hypertension,  
GERD, hypothyroidism, CAD, s/p right CEA in 2009 for 80% stenosis. On presentation, BP elevated 201/101, pt will be admitted for further evaluation and treatment.  
  
Subjective: Pt seen in room, working with PT , ambulating in room, No complaints of pain or shortness of breath. Current Facility-Administered Medications Medication Dose Route Frequency  furosemide (LASIX) tablet 40 mg  40 mg Oral DAILY  atorvastatin (LIPITOR) tablet 40 mg  40 mg Oral QHS  clopidogreL (PLAVIX) tablet 75 mg  75 mg Oral DAILY  promethazine (PHENERGAN) with saline injection 12.5 mg  12.5 mg IntraVENous Q6H PRN  
 bisacodyL (DULCOLAX) suppository 10 mg  10 mg Rectal DAILY PRN  polyethylene glycol (MIRALAX) packet 17 g  17 g Oral DAILY  senna-docusate (PERICOLACE) 8.6-50 mg per tablet 2 Tab  2 Tab Oral QHS  aspirin delayed-release tablet 81 mg  81 mg Oral DAILY  ergocalciferol capsule 50,000 Units  50,000 Units Oral Q7D  
 pantoprazole (PROTONIX) tablet 40 mg  40 mg Oral ACB  levothyroxine (SYNTHROID) tablet 88 mcg  88 mcg Oral ACB  loratadine (CLARITIN) tablet 10 mg  10 mg Oral DAILY  losartan (COZAAR) tablet 100 mg  100 mg Oral DAILY  acetaminophen (TYLENOL) tablet 650 mg  650 mg Oral Q4H PRN  
 ondansetron (ZOFRAN) injection 4 mg  4 mg IntraVENous Q6H PRN  
 melatonin tablet 3 mg  3 mg Oral QHS PRN  
 hydrOXYzine pamoate (VISTARIL) capsule 25 mg  25 mg Oral TID PRN  
 hydrALAZINE (APRESOLINE) tablet 10 mg  10 mg Oral BID PRN Review of Systems: 
 
Review of Systems Constitutional: Negative for chills and fever. HENT: Negative for congestion and sore throat. Respiratory: Negative for cough and shortness of breath. Cardiovascular: Negative for chest pain and palpitations. Gastrointestinal: Negative for heartburn, nausea and vomiting. Genitourinary: Negative for dysuria and urgency. Neurological: Negative for dizziness and headaches. Objective:  
 
Visit Vitals BP (!) 157/77 (BP 1 Location: Left upper arm, BP Patient Position: At rest) Pulse 88 Temp 98.1 °F (36.7 °C) Resp 18 Ht 5' 4.02\" (1.626 m) Wt 68 kg (149 lb 14.6 oz) SpO2 92% BMI 25.72 kg/m² O2 Flow Rate (L/min): 2 l/min O2 Device: Room air Temp (24hrs), Av.1 °F (36.7 °C), Min:97.8 °F (36.6 °C), Max:98.5 °F (36.9 °C) No intake/output data recorded. No intake/output data recorded. PHYSICAL EXAM: 
 
Physical Exam  
Constitutional: She is oriented to person, place, and time. She appears well-developed. No distress. HENT:  
Right Ear: Decreased hearing is noted. Left Ear: Decreased hearing is noted. Neck: Normal range of motion. Neck supple. Cardiovascular: Normal rate, regular rhythm and intact distal pulses. Murmur heard. Pulmonary/Chest: Effort normal and breath sounds normal.  
Abdominal: Soft. Bowel sounds are normal.  
Musculoskeletal: Normal range of motion. Neurological: She is alert and oriented to person, place, and time. Skin: Skin is warm and dry. Psychiatric: She has a normal mood and affect. Her behavior is normal.  
  
 
 
Data Review No results found for this or any previous visit (from the past 24 hour(s)). XR CHEST PORT Final Result Findings suggesting hydrostatic edema.   
  
CTA CODE NEURO HEAD AND NECK W CONT Final Result Addendum 1 of 1 Addendum: IMPRESSION:  
  
Nonspecific mild prominence of mediastinal lymph nodes, uncertain   
etiology. Correlate clinically for an underlying systemic/reactive process. Short-term  
follow-up with dedicated CT chest can be considered as clinically   
indicated  
given patient age. Final  
  
CT CODE NEURO HEAD WO CONTRAST Final Result No acute intracranial abnormality. Chronic changes as above. The findings were discussed with Dr. Gamal Sequeira by Dr. Faye Castañeda at [phone] hours on  
2/4/2021 via phone conversation. Active Problems: 
  TIA (transient ischemic attack) (2/4/2021) Stroke [facility]) (2/5/2021) Assessment/Plan: 1. 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  
Continue statin, asa 
  
2. Hypertension-  controlled, on losartan. 3. CAP- recheck CXR tomorrow, some edema present, on lasix. 
  
4. Generalized weakness- OT/PT/speech following. OOB to chair.  
  
5. Aortic stenosis-found on echo, EF 78%, start daily lasix 40 mg, cardiology following Outpatient evaluation after discharge. 6. Discharge plan- PT recommend IRF, CM following. DVT Prophylaxis:sequential compression Code Status: 
Full Code POA: Hattie Hightower , Parviz Smith- daughter  Care Plan discussed with:  
____staff nurse, patient , case management______________________________________________________ Gabby Redmond NP

## 2021-02-07 NOTE — PROGRESS NOTES
Problem: Falls - Risk of 
Goal: *Absence of Falls Description: Document Kristyn Edge Fall Risk and appropriate interventions in the flowsheet. Outcome: Progressing Towards Goal 
Note: Fall Risk Interventions: 
Mobility Interventions: Bed/chair exit alarm, OT consult for ADLs, PT Consult for mobility concerns Mentation Interventions: Bed/chair exit alarm, Adequate sleep, hydration, pain control, More frequent rounding Medication Interventions: Bed/chair exit alarm Elimination Interventions: Bed/chair exit alarm, Call light in reach Problem: Patient Education: Go to Patient Education Activity Goal: Patient/Family Education Outcome: Progressing Towards Goal 
  
Problem: Patient Education: Go to Patient Education Activity Goal: Patient/Family Education Outcome: Progressing Towards Goal 
  
Problem: Patient Education: Go to Patient Education Activity Goal: Patient/Family Education Outcome: Progressing Towards Goal 
  
Problem: Patient Education: Go to Patient Education Activity Goal: Patient/Family Education Outcome: Progressing Towards Goal 
  
Problem: Pressure Injury - Risk of 
Goal: *Prevention of pressure injury Description: Document Prateek Scale and appropriate interventions in the flowsheet. Outcome: Progressing Towards Goal 
Note: Pressure Injury Interventions: 
Sensory Interventions: Assess changes in LOC, Assess need for specialty bed, Maintain/enhance activity level, Keep linens dry and wrinkle-free, Minimize linen layers Moisture Interventions: Absorbent underpads, Apply protective barrier, creams and emollients, Minimize layers, Maintain skin hydration (lotion/cream) Activity Interventions: Assess need for specialty bed, Increase time out of bed, PT/OT evaluation Mobility Interventions: Assess need for specialty bed, HOB 30 degrees or less, Turn and reposition approx. every two hours(pillow and wedges) Nutrition Interventions: Document food/fluid/supplement intake, Offer support with meals,snacks and hydration Friction and Shear Interventions: Apply protective barrier, creams and emollients, HOB 30 degrees or less, Minimize layers Problem: Patient Education: Go to Patient Education Activity Goal: Patient/Family Education Outcome: Progressing Towards Goal

## 2021-02-07 NOTE — DISCHARGE SUMMARY
Hospitalist Discharge Summary Patient ID:   
Keron John 
[id number] 
29 y.o. 
8/3/1930 Admit date: 2/4/2021 Discharge date : 2/7/2021 Chronic Diagnoses:   
Problem List as of 2/7/2021 Date Reviewed: 2/4/2021 Codes Class Noted - Resolved Stroke [facility]) ICD-10-CM: I63.9 ICD-9-CM: 434.91  2/5/2021 - Present  
   
 TIA (transient ischemic attack) ICD-10-CM: G45.9 ICD-9-CM: 435.9  2/4/2021 - Present Essential hypertension ICD-10-CM: I10 
ICD-9-CM: 401.9  8/22/2017 - Present Gastroesophageal reflux disease without esophagitis ICD-10-CM: K21.9 ICD-9-CM: 530.81  8/22/2017 - Present Urinary incontinence ICD-10-CM: R32 
ICD-9-CM: 788.30  8/22/2017 - Present Acquired hypothyroidism ICD-10-CM: E03.9 ICD-9-CM: 244.9  8/22/2017 - Present Allergic rhinitis ICD-10-CM: J30.9 ICD-9-CM: 477.9  8/22/2017 - Present Chronic cough ICD-10-CM: R05 ICD-9-CM: 786.2  8/22/2017 - Present 25 Final Diagnoses: Active Problems: 
  TIA (transient ischemic attack) (2/4/2021) Stroke [facility]) (2/5/2021) Reason for Hospitalization: 
Keron John is a 80 y.o. female who was brought to the hospital by EMS , reported by family was at home, and suddenly became less responsive and aphasic, after episode of vomiting. Family reports has been treated for pneumonia and was finishing antibiotic prescription. She was taking zofran before each dose of augmentin for nausea. Pt presented with slight aphasia and confusion , however, upon my assessment , pt was more alert and oriented, very [place] , following commands, speech clear and face is symmetrical. PMH includes carotid stenosis, hypertension,  
GERD, hypothyroidism, CAD, s/p right CEA in 2009 for 80% stenosis. On presentation, BP elevated 201/101, pt will be admitted for further evaluation and treatment. Hospital Course:  
Pt admitted for episode of aphasia, and AMS.  Pt has history of remote LMCA infarct, CTA head showing left frontal lobe encephalomalacia, and possible acute ischemia. Pt has history of left carotid stent in 2009, and right carotid showing 40-50% stenosis on CTA neck. Pt started on DAPT in hospital, and follow up with neurology in 3 weeks for outpatient EEG. Echocardiogram was done and shows severe aortic stenosis and started on daily diuretic, will follow up with cardiology in 2 weeks after discharge for further evaluation. Pt to continue with other home medications and follow up with PCP in 6 weeks or sooner if needed. Discharge Medications:  
Current Discharge Medication List  
  
START taking these medications Details  
furosemide (LASIX) 40 mg tablet Take 1 Tab by mouth daily for 30 days. Qty: 30 Tab, Refills: 0  
  
clopidogreL (PLAVIX) 75 mg tab Take 1 Tab by mouth daily for 30 days. Qty: 30 Tab, Refills: 0  
  
acetaminophen (TYLENOL) 325 mg tablet Take 2 Tabs by mouth every four (4) hours as needed for Pain or Fever for up to 30 days. Qty: 180 Tab, Refills: 0 CONTINUE these medications which have CHANGED Details  
simvastatin (ZOCOR) 40 mg tablet Take 1 Tab by mouth nightly for 30 days. Qty: 30 Tab, Refills: 0 CONTINUE these medications which have NOT CHANGED Details  
esomeprazole (NexIUM) 20 mg capsule Take 20 mg by mouth daily. valsartan (DIOVAN) 160 mg tablet Take 1 Tab by mouth daily. Qty: 30 Tab, Refills: 1 Associated Diagnoses: Essential hypertension  
  
ergocalciferol (ERGOCALCIFEROL) 50,000 unit capsule Take 1 Cap by mouth every seven (7) days. Qty: 12 Cap, Refills: 0  
  
loratadine (CLARITIN) 10 mg tablet Take 10 mg by mouth. aspirin delayed-release 81 mg tablet Take  by mouth daily. levothyroxine (SYNTHROID) 88 mcg tablet Take  by mouth Daily (before breakfast). montelukast (SINGULAIR) 10 mg tablet Take 1 Tab by mouth daily. Qty: 30 Tab, Refills: 0 Associated Diagnoses:  Allergic rhinitis, unspecified allergic rhinitis trigger, unspecified rhinitis seasonality; Chronic cough STOP taking these medications  
  
 cetaphil (CETAPHIL) lotion Comments:  
Reason for Stopping:   
   
 estradiol (ESTRACE) 0.01 % (0.1 mg/gram) vaginal cream Comments:  
Reason for Stopping: Follow up Care: 1. Chato Rich MD in 4 weeks. Follow-up Information Follow up With Specialties Details Why Contact Info Chato Rich MD Lab, Internal Medicine In 4 weeks  [place] [address] [phone] [place] [address] 
[phone] Chito Sparrow MD Cardiology In 3 weeks  [address] [facility] [address] 
[phone] Kelly Jones MD Neurology In 3 weeks follow up as new patient referral for outpatient EEG [address] [address] 
[phone] Patient Follow Up Instructions: Activity: Activity as tolerated Diet:  Cardiac Diet Condition at Discharge:  Stable 
__________________________________________________________________ Disposition [address] [place] 
____________________________________________________________________ Code Status: 
Full Code 
___________________________________________________________________ Discharge Exam: 
Patient seen and examined by me on discharge day. Physical Exam  
Constitutional: She is oriented to person, place, and time. She appears well-developed. No distress. HENT:  
Right Ear: Decreased hearing is noted. Left Ear: Decreased hearing is noted. Eyes: Pupils are equal, round, and reactive to light. Neck: Normal range of motion. Neck supple. Cardiovascular: Normal rate and regular rhythm.  Murmur heard. Pulmonary/Chest: Effort normal and breath sounds normal. No respiratory distress. Musculoskeletal: Normal range of motion. Neurological: She is alert and oriented to person, place, and time. CONSULTATIONS: Cardiology, Neurology Significant Diagnostic Studies: No results found for this or any previous visit (from the past 24 hour(s)). XR CHEST PORT Final Result Findings suggesting hydrostatic edema. CTA CODE NEURO HEAD AND NECK W CONT Final Result Addendum 1 of 1 Addendum: IMPRESSION:  
  
Nonspecific mild prominence of mediastinal lymph nodes, uncertain   
etiology. Correlate clinically for an underlying systemic/reactive process. Short-term  
follow-up with dedicated CT chest can be considered as clinically   
indicated  
given patient age. Final  
  
CT CODE NEURO HEAD WO CONTRAST Final Result No acute intracranial abnormality. Chronic changes as above. The findings were discussed with Dr. [place] Aguiar by Dr. Rodrigo Oniel at [phone] hours on  
2/4/2021 via phone conversation. XR CHEST PORT    (Results Pending) Discharge: time spent 35 minutes in discharge Education and counseling.   
 
Signed: 
Tanner Almeida NP 
2/7/2021 
4:27 PM

## 2021-02-07 NOTE — PROGRESS NOTES
CARDIOLOGY PROGRESS NOTE Patient seen and examined. This is a patient who is followed for severe AS. Resting comfortably this morning. Denies chest pain or discomfort. Shortness of breath with exertion. No other complaints reported. Family member at bedside. Telemetry reviewed, there were no events noted in the past 24 hours. Sinus rhythm. Pertinent review of systems items noted above, all other systems are negative. Current medications reviewed. Physical Examination Vital signs are stable. Blood pressure 131/75, Pulse 90 No apparent distress. Heart is regular, rate and rhythm. Normal S1, S2, no murmurs are appreciated. Lungs are clear bilaterally. Abdomen is soft, nontender, normal bowel sounds. Extremities have no edema. Labs reviewed:  
 
Echocardiogram 2/5/2021 · LV: Calculated LVEF is 70%. Normal cavity size and systolic function (ejection fraction normal). Mild concentric hypertrophy. Moderate (grade 2) left ventricular diastolic dysfunction. · Saline contrast was given to evaluate for intracardiac shunt. Negative bubble study. · LA: Severely dilated left atrium. · RV: Mildly dilated right ventricle. · AV: Severe aortic valve stenosis is present, valve area 0.78. 
· MV: Mild mitral valve regurgitation is present. · TV: Mild tricuspid valve regurgitation is present. Case discussed with Dr. Maxene Carrel and our impression and recommendations are as follows: 1. Severe Aortic stenosis: Reports no previous history. Monitor volume status closely. Continue 40mg lasix po daily. Strict I&Os. Will need outpatient follow up for future work up. 2. Hypertensive emergency: Resume home medication (valsartan 160mg daily) and continue hydralazine PRN. Continue to monitor. 3. TIA: per neuro. On aspirin and plavix. Continue to monitor for bleeding. 4. Carotid stenosis: per CTA  History of left CEA.  Left-sided carotid stent without hemodynamically significant luminal Stenosis.  Right carotid atherosclerosis causing 40-50% luminal stenosis of the lef ICA. Continue DAPT and statin. Please do not hesitate to call me or Dr. Rhonda Solomon if additional questions arise.

## 2021-02-07 NOTE — PROGRESS NOTES
Reason for Admission: TIA  HTN     
                
RUR Score:     10% Plan for utilizing home health: Expect IRF      
 
PCP: First and Last name:  Dr Lino Loving Name of Practice:  
 Are you a current patient:yes Yes/No:  
 Approximate date of last visit: Margy Roth Can you participate in a virtual visit with your PCP: no 
                 
Current Advanced Directive/Advance Care Plan: None Transition of Care Plan:                   
Plan to return home after short rehab stay at [facility]

## 2021-02-07 NOTE — PROGRESS NOTES
[facility] called about needing patient medications in the discharge summary faxed over before the pharmacist left for the night. Tried to fax the discharge summary twice and it said fax failed:fax busy. Tried calling [facility] back to let them know, but did not get an answer.

## 2021-02-07 NOTE — PROGRESS NOTES
Received call [email or web address] that pt has been accepted for admission today. Floor notified. Please call report to  as soon as transport time is known. Attending aware. DC order and medicine progress note from this date faxed via [phone] to [facility]

## 2021-02-07 NOTE — PROGRESS NOTES
Report called to [facility]. Transport being set up through [facility]. Receiving room assignment 105b.

## 2021-02-07 NOTE — BH NOTES
Received called referral from Attending regarding IRF  needed for post hospital care. Choice completed by pts daughter Yohana Gibbons [phone]. Choice confirmed with other siblings who offered opinions regarding window visit policy only   Referral faxed to [facility] for consideration.

## 2021-02-07 NOTE — PROGRESS NOTES
Hospitalist Progress Note Subjective:  
Daily Progress Note: 2/7/2021 2:46 PM 
 
Hospital Course: 
Erika Fields a 80 y. o. female who was brought to the hospital by EMS , reported by family was at home, and suddenly became less responsive and aphasic, after episode of vomiting. Family reports has been treated for pneumonia and was finishing antibiotic prescription. She was taking zofran before each dose of augmentin for nausea. Pt presented with slight aphasia and confusion , however, upon my assessment , pt was more alert and oriented, very [place] , following commands, speech clear and face is symmetrical. PMH includes carotid stenosis, hypertension,  
GERD, hypothyroidism, CAD, s/p right CEA in 2009 for 80% stenosis. On presentation, BP elevated 201/101, pt will be admitted for further evaluation and treatment.  
 
Subjective: Pt seen in room, up with PT for ambulation, no complaints Current Facility-Administered Medications Medication Dose Route Frequency  furosemide (LASIX) tablet 40 mg  40 mg Oral DAILY  atorvastatin (LIPITOR) tablet 40 mg  40 mg Oral QHS  clopidogreL (PLAVIX) tablet 75 mg  75 mg Oral DAILY  promethazine (PHENERGAN) with saline injection 12.5 mg  12.5 mg IntraVENous Q6H PRN  
 bisacodyL (DULCOLAX) suppository 10 mg  10 mg Rectal DAILY PRN  polyethylene glycol (MIRALAX) packet 17 g  17 g Oral DAILY  senna-docusate (PERICOLACE) 8.6-50 mg per tablet 2 Tab  2 Tab Oral QHS  aspirin delayed-release tablet 81 mg  81 mg Oral DAILY  ergocalciferol capsule 50,000 Units  50,000 Units Oral Q7D  
 pantoprazole (PROTONIX) tablet 40 mg  40 mg Oral ACB  levothyroxine (SYNTHROID) tablet 88 mcg  88 mcg Oral ACB  loratadine (CLARITIN) tablet 10 mg  10 mg Oral DAILY  losartan (COZAAR) tablet 100 mg  100 mg Oral DAILY  acetaminophen (TYLENOL) tablet 650 mg  650 mg Oral Q4H PRN  
 ondansetron (ZOFRAN) injection 4 mg  4 mg IntraVENous Q6H PRN  
 melatonin tablet 3 mg  3 mg Oral QHS PRN  
 hydrOXYzine pamoate (VISTARIL) capsule 25 mg  25 mg Oral TID PRN  
 hydrALAZINE (APRESOLINE) tablet 10 mg  10 mg Oral BID PRN Review of Systems: 
 
Review of Systems Constitutional: Negative for chills and fever. HENT: Negative for congestion and sore throat. Respiratory: Negative for cough and shortness of breath. Cardiovascular: Negative for chest pain, palpitations and orthopnea. Gastrointestinal: Negative for heartburn, nausea and vomiting. Genitourinary: Negative for dysuria and urgency. Neurological: Negative for dizziness and headaches. Objective:  
 
Visit Vitals /75 (BP 1 Location: Left upper arm, BP Patient Position: Sitting) Pulse 86 Temp 98.1 °F (36.7 °C) Resp 18 Ht 5' 4.02\" (1.626 m) Wt 68 kg (149 lb 14.6 oz) SpO2 93% BMI 25.72 kg/m² O2 Flow Rate (L/min): 2 l/min O2 Device: Room air Temp (24hrs), Av.1 °F (36.7 °C), Min:97.7 °F (36.5 °C), Max:98.9 °F (37.2 °C) No intake/output data recorded. No intake/output data recorded. PHYSICAL EXAM: 
 
Physical Exam  
Constitutional: She is oriented to person, place, and time. She appears well-developed. No distress. HENT:  
Right Ear: Decreased hearing is noted. Left Ear: Decreased hearing is noted. Neck: Normal range of motion. Neck supple. Cardiovascular: Normal rate, regular rhythm and intact distal pulses. Murmur heard. Pulmonary/Chest: Effort normal and breath sounds normal.  
Abdominal: Soft. Bowel sounds are normal.  
Musculoskeletal: Normal range of motion. Neurological: She is alert and oriented to person, place, and time. Skin: Skin is warm and dry. Psychiatric: She has a normal mood and affect. Her behavior is normal.  
  
 
 
Data Review No results found for this or any previous visit (from the past 24 hour(s)). XR CHEST PORT Final Result Findings suggesting hydrostatic edema. CTA CODE NEURO HEAD AND NECK W CONT Final Result Addendum 1 of 1 Addendum: IMPRESSION:  
  
Nonspecific mild prominence of mediastinal lymph nodes, uncertain   
etiology. Correlate clinically for an underlying systemic/reactive process. Short-term  
follow-up with dedicated CT chest can be considered as clinically   
indicated  
given patient age. Final  
  
CT CODE NEURO HEAD WO CONTRAST Final Result No acute intracranial abnormality. Chronic changes as above. The findings were discussed with Dr. Yashira Cornelius by Dr. Hayden Ghotra at [phone] hours on  
2/4/2021 via phone conversation. Active Problems: 
  TIA (transient ischemic attack) (2/4/2021) Stroke [facility]) (2/5/2021) Assessment/Plan: 1. 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  
Continue statin, asa. 
  
2. Hypertension-  controlled, on losartan. 
  
3. CAP- some edema present, on lasix, prior outpatient tx for pneumonia, Check CXR in am.  
  
4. Generalized weakness- OT/PT/speech following. OOB to chair.  
  
5. Aortic stenosis-found on echo, EF 78%,  
 daily lasix 40 mg, cardiology following Outpatient evaluation after discharge.  
  
6. Discharge plan- IR referral sent, CM to follow DVT Prophylaxis: sequential compression Code Status: 
Full Code POA:  Sindy Velazco , Marlene Fields- daughter [phone] [facility] discussed with:  
________NOBLE, staff nurse, patient, family_______________________________________________________ Malachi Bloom NP

## 2021-02-08 NOTE — PROGRESS NOTES
Vitals stable, Iv removed. Pt picked up by [facility], discharge paper work handed to daughter. Pt discharged to [facility].

## 2021-02-16 NOTE — PROGRESS NOTES
Physician Progress Note Moise Renee 
[facility] #:                  [id number] :                       8/3/1930 ADMIT DATE:       2021 11:25 AM 
DISCH DATE:        2021 7:30 PM 
RESPONDING 
PROVIDER #:        Frederic HARRIS NP 
 
 
 
 
QUERY TEXT: 
 
Alvarez, 
Pt admitted with possible CVA. Noted documentation of hypertensive emergency on  cardiology consult. If possible, please document in progress notes and discharge summary: The medical record reflects the following: 
Risk Factors: history of hypertension Clinical Indicators: /101 on admission Treatment: IV Labetolol, IV Lasix, Thank you, Rosalie Reed, RN Options provided: 
-- Hypertensive emergency  confirmed present on admission -- Hypertensive emergency confirmed not present on admission -- Hypertensive emergency ruled out 
-- Other - I will add my own diagnosis -- Disagree - Not applicable / Not valid -- Disagree - Clinically unable to determine / Unknown 
-- Refer to Clinical Documentation Reviewer PROVIDER RESPONSE TEXT: 
 
The diagnosis of Hypertensive emergency was confirmed as present on admission. Query created by: Vince Underwood on 2/10/2021 2:14 PM 
 
 
QUERY TEXT: 
 
Alvarez, 
Pt admitted with CVA and has CHF documented. If possible, please document in progress notes and discharge summary further specificity regarding the type and acuity of CHF: 
 
 
The medical record reflects the following: 
Risk Factors: hypertensive, Hx of CHF Clinical Indicators: CXR - Findings suggesting hydrostatic edema. ECHO - Calculated LVEF is 70%. Normal cavity size and systolic function (ejection fraction normal). Mild concentric hypertrophy. Moderate (grade 2) left ventricular diastolic dysfunction. Treatment: IV Lasix, IV Labetolol Thank you, Rosalie Reed, RN Options provided: 
-- Acute on Chronic Diastolic CHF/HFpEF 
-- Acute Diastolic CHF/HFpEF 
-- Chronic Diastolic CHF/HFpEF 
-- Other - I will add my own diagnosis -- Disagree - Not applicable / Not valid -- Disagree - Clinically unable to determine / Unknown 
-- Refer to Clinical Documentation Reviewer PROVIDER RESPONSE TEXT: 
 
This patient is in acute on chronic diastolic CHF/HFpEF. Query created by:  Edwin Sheridan on 2/10/2021 2:20 PM 
 
 
Electronically signed by:  Tarik Epperson NP 2/16/2021 7:12 AM

## 2021-02-20 NOTE — DISCHARGE INSTRUCTIONS
Thank you! Thank you for allowing me to care for you in the emergency department. I sincerely hope that you are satisfied with your visit today. It is my goal to provide you with excellent care. Below you will find a list of your labs and imaging from your visit today. Should you have any questions regarding these results please do not hesitate to call the emergency department. Labs -     Recent Results (from the past 12 hour(s))   CBC WITH AUTOMATED DIFF    Collection Time: 02/20/21  2:00 PM   Result Value Ref Range    WBC 7.6 3.6 - 11.0 K/uL    RBC 3.91 3.80 - 5.20 M/uL    HGB 12.7 11.5 - 16.0 g/dL    HCT 38.7 35.0 - 47.0 %    MCV 99.0 80.0 - 99.0 FL    MCH 32.5 26.0 - 34.0 PG    MCHC 32.8 30.0 - 36.5 g/dL    RDW 14.3 11.5 - 14.5 %    PLATELET 823 126 - 982 K/uL    MPV 10.4 8.9 - 12.9 FL    NRBC 0.0 0  WBC    ABSOLUTE NRBC 0.00 0.00 - 0.01 K/uL    NEUTROPHILS 59 32 - 75 %    LYMPHOCYTES 22 12 - 49 %    MONOCYTES 15 (H) 5 - 13 %    EOSINOPHILS 4 0 - 7 %    BASOPHILS 0 0 - 1 %    IMMATURE GRANULOCYTES 0 0.0 - 0.5 %    ABS. NEUTROPHILS 4.5 1.8 - 8.0 K/UL    ABS. LYMPHOCYTES 1.6 0.8 - 3.5 K/UL    ABS. MONOCYTES 1.1 (H) 0.0 - 1.0 K/UL    ABS. EOSINOPHILS 0.3 0.0 - 0.4 K/UL    ABS. BASOPHILS 0.0 0.0 - 0.1 K/UL    ABS. IMM. GRANS. 0.0 0.00 - 0.04 K/UL    DF AUTOMATED     METABOLIC PANEL, COMPREHENSIVE    Collection Time: 02/20/21  2:00 PM   Result Value Ref Range    Sodium 140 136 - 145 mmol/L    Potassium 3.5 3.5 - 5.1 mmol/L    Chloride 102 97 - 108 mmol/L    CO2 31 21 - 32 mmol/L    Anion gap 7 5 - 15 mmol/L    Glucose 105 (H) 65 - 100 mg/dL    BUN 33 (H) 6 - 20 mg/dL    Creatinine 1.79 (H) 0.55 - 1.02 mg/dL    BUN/Creatinine ratio 18 12 - 20      GFR est AA 32 (L) >60 ml/min/1.73m2    GFR est non-AA 27 (L) >60 ml/min/1.73m2    Calcium 8.7 8.5 - 10.1 mg/dL    Bilirubin, total 0.4 0.2 - 1.0 mg/dL    AST (SGOT) 24 15 - 37 U/L    ALT (SGPT) 22 12 - 78 U/L    Alk.  phosphatase 96 45 - 117 U/L    Protein, total 6.9 6.4 - 8.2 g/dL    Albumin 3.0 (L) 3.5 - 5.0 g/dL    Globulin 3.9 2.0 - 4.0 g/dL    A-G Ratio 0.8 (L) 1.1 - 2.2     TROPONIN I    Collection Time: 02/20/21  2:00 PM   Result Value Ref Range    Troponin-I, Qt. <0.05 <0.05 ng/mL   BNP    Collection Time: 02/20/21  2:00 PM   Result Value Ref Range    NT pro- (H) <450 pg/mL   MAGNESIUM    Collection Time: 02/20/21  2:00 PM   Result Value Ref Range    Magnesium 2.3 1.6 - 2.4 mg/dL   EKG, 12 LEAD, INITIAL    Collection Time: 02/20/21  2:24 PM   Result Value Ref Range    Ventricular Rate 66 BPM    Atrial Rate 66 BPM    P-R Interval 124 ms    QRS Duration 102 ms    Q-T Interval 440 ms    QTC Calculation (Bezet) 461 ms    Calculated P Axis 60 degrees    Calculated R Axis -30 degrees    Calculated T Axis 73 degrees    Diagnosis       Normal sinus rhythm  Left axis deviation  Nonspecific ST and T wave abnormality  Abnormal ECG  When compared with ECG of 04-FEB-2021 12:37,  Nonspecific T wave abnormality no longer evident in Inferior leads  Confirmed by DIANA HAGEN, Huber Porter ([id number]) on 2/20/2021 4:13:50 PM     URINALYSIS W/ REFLEX CULTURE    Collection Time: 02/20/21  4:40 PM    Specimen: Urine   Result Value Ref Range    Color Yellow/Straw      Appearance Clear Clear      Specific gravity 1.006 1.003 - 1.030      pH (UA) 5.0 5.0 - 8.0      Protein Negative Negative mg/dL    Glucose Negative Negative mg/dL    Ketone Negative Negative mg/dL    Bilirubin Negative Negative      Blood Negative Negative      Urobilinogen 0.1 0.1 - 1.0 EU/dL    Nitrites Negative Negative      Leukocyte Esterase Negative Negative      UA:UC IF INDICATED Culture not indicated by UA result Culture not indicated by UA result      WBC 0-4 0 - 4 /hpf    RBC 0-5 0 - 5 /hpf    Bacteria Negative Negative /hpf       Radiologic Studies -   XR CHEST PORT   Final Result   Negative for cardiac decompensation, no radiographic evidence of pneumonia.         CT Results  (Last 48 hours)      None          CXR Results  (Last 48 hours)                 02/20/21 1402  XR CHEST PORT Final result    Impression:  Negative for cardiac decompensation, no radiographic evidence of pneumonia. Narrative:  XR CHEST PORT       Comparison: Chest radiograph dated February 4, 2021       Findings: The lungs are adequately inflated without focal consolidation. Cardiac   silhouette is within normal limits for size, no evidence of cardiac   decompensation. Eventration right hemidiaphragm. Degenerative change bilateral   shoulders. If you feel that you have not received excellent quality care or timely care, please ask to speak to the nurse manager. Please choose us in the future for your continued health care needs. ------------------------------------------------------------------------------------------------------------  The exam and treatment you received in the Emergency Department were for an urgent problem and are not intended as complete care. It is important that you follow-up with a doctor, nurse practitioner, or physician assistant to:  (1) confirm your diagnosis,  (2) re-evaluation of changes in your illness and treatment, and  (3) for ongoing care. If your symptoms become worse or you do not improve as expected and you are unable to reach your usual health care provider, you should return to the Emergency Department. We are available 24 hours a day. Please take your discharge instructions with you when you go to your follow-up appointment. If you have any problem arranging a follow-up appointment, contact the Emergency Department immediately. If a prescription has been provided, please have it filled as soon as possible to prevent a delay in treatment. Read the entire medication instruction sheet provided to you by the pharmacy.  If you have any questions or reservations about taking the medication due to side effects or interactions with other medications, please call your primary care physician or contact the ER to speak with the charge nurse. Make an appointment with your family doctor or the physician you were referred to for follow-up of this visit as instructed on your discharge paperwork, as this is a mandatory follow-up. Return to the ER if you are unable to be seen or if you are unable to be seen in a timely manner. If you have any problem arranging the follow-up visit, contact the Emergency Department immediately.

## 2021-02-20 NOTE — ED TRIAGE NOTES
Patient had tia and was in rehab , released earlier this week. Patient has been having dizziness and low bp according to family member.

## 2021-02-20 NOTE — ED NOTES
Patient alert, skin w/d color normal. Speech clear. Ate 30% meal hans PO well.  Daughter at side to assist.

## 2021-02-20 NOTE — ED NOTES
patient with family at side, speech clear, denies c/o at this time. Repositioned up in cart, on CM and ekg complete.

## 2021-02-20 NOTE — ED PROVIDER NOTES
EMERGENCY DEPARTMENT HISTORY AND PHYSICAL EXAM 
 
 
Date: 2/20/2021 Patient Name: Mike Lee History of Presenting Illness Chief Complaint Patient presents with  Dizziness  Hypotension History Provided By: Patient and Patient's Daughter HPI: Mike Lee, 80 y.o. female with a past medical history significant myocardial infarction presents to the ED with chief complaint of Dizziness and Hypotension Loki Mayer 80-year-old female recently in the hospital had cardiac evaluation and stents. She went to rehab. Then went home and is not been eating or drinking well. They do not think that she was eating or drinking well in rehab either. Her blood pressure was low yesterday and today. They did have to blood pressure medicine. She start improving with her fluid intake today but she is still feeling weak. Did just finished a urinary tract infection medication as well. There are no other complaints, changes, or physical findings at this time. PCP: Leatha Leo MD 
 
Current Outpatient Medications Medication Sig Dispense Refill  furosemide (LASIX) 40 mg tablet Take 1 Tab by mouth daily for 30 days. 30 Tab 0  clopidogreL (PLAVIX) 75 mg tab Take 1 Tab by mouth daily for 30 days. 30 Tab 0  
 acetaminophen (TYLENOL) 325 mg tablet Take 2 Tabs by mouth every four (4) hours as needed for Pain or Fever for up to 30 days. 180 Tab 0  
 simvastatin (ZOCOR) 40 mg tablet Take 1 Tab by mouth nightly for 30 days. 30 Tab 0  
 esomeprazole (NexIUM) 20 mg capsule Take 20 mg by mouth daily.  valsartan (DIOVAN) 160 mg tablet Take 1 Tab by mouth daily. 30 Tab 1  
 ergocalciferol (ERGOCALCIFEROL) 50,000 unit capsule Take 1 Cap by mouth every seven (7) days. 12 Cap 0  
 loratadine (CLARITIN) 10 mg tablet Take 10 mg by mouth.  aspirin delayed-release 81 mg tablet Take 81 mg by mouth daily.     
 levothyroxine (SYNTHROID) 88 mcg tablet Take 88 mcg by mouth Daily (before breakfast).  montelukast (SINGULAIR) 10 mg tablet Take 1 Tab by mouth daily. 30 Tab 0 Past History Past Medical History: 
Past Medical History:  
Diagnosis Date  Hypercholesterolemia  Hypertension  Stroke ([address]  Thyroid disease Past Surgical History: 
Past Surgical History:  
Procedure Laterality Date  HX HYSTERECTOMY Family History: 
Family History Problem Relation Age of Onset  Hypertension Mother  Cancer Father Social History: 
Social History Tobacco Use  Smoking status: Never Smoker  Smokeless tobacco: Never Used Substance Use Topics  Alcohol use: No  
 Drug use: No  
 
 
Allergies: 
No Known Allergies Review of Systems Review of Systems Constitutional: Negative. Negative for chills, fatigue and fever. HENT: Negative. Negative for congestion, nosebleeds and sore throat. Eyes: Negative. Negative for pain, discharge and visual disturbance. Respiratory: Negative. Negative for cough, chest tightness and shortness of breath. Cardiovascular: Negative for chest pain, palpitations and leg swelling. Gastrointestinal: Negative for abdominal pain, blood in stool, constipation, diarrhea, nausea and vomiting. Endocrine: Negative. Genitourinary: Negative. Negative for difficulty urinating, dysuria, pelvic pain and vaginal bleeding. Musculoskeletal: Negative. Negative for arthralgias, back pain and myalgias. Skin: Negative. Negative for rash and wound. Allergic/Immunologic: Negative. Neurological: Positive for dizziness and weakness. Negative for syncope, numbness and headaches. Hematological: Negative. Psychiatric/Behavioral: Negative. Negative for agitation, confusion and suicidal ideas. All other systems reviewed and are negative. Physical Exam  
Physical Exam 
Vitals signs and nursing note reviewed. Exam conducted with a chaperone present.   
Constitutional:   
   Appearance: Normal appearance. She is normal weight. HENT:  
   Head: Normocephalic and atraumatic. Nose: Nose normal.  
   Mouth/Throat:  
   Mouth: Mucous membranes are moist.  
   Pharynx: Oropharynx is clear. Eyes:  
   Extraocular Movements: Extraocular movements intact. Conjunctiva/sclera: Conjunctivae normal.  
   Pupils: Pupils are equal, round, and reactive to light. Neck: Musculoskeletal: Normal range of motion and neck supple. Cardiovascular:  
   Rate and Rhythm: Normal rate and regular rhythm. Pulses: Normal pulses. Heart sounds: Normal heart sounds. Pulmonary:  
   Effort: Pulmonary effort is normal. No respiratory distress. Breath sounds: Normal breath sounds. Abdominal:  
   General: Abdomen is flat. Bowel sounds are normal. There is no distension. Palpations: Abdomen is soft. Tenderness: There is no abdominal tenderness. There is no guarding. Musculoskeletal: Normal range of motion. General: No swelling, tenderness, deformity or signs of injury. Right lower leg: No edema. Left lower leg: No edema. Skin: 
   General: Skin is warm and dry. Capillary Refill: Capillary refill takes less than 2 seconds. Findings: No lesion or rash. Neurological:  
   General: No focal deficit present. Mental Status: She is alert and oriented to person, place, and time. Mental status is at baseline. Cranial Nerves: No cranial nerve deficit. Psychiatric:     
   Mood and Affect: Mood normal.     
   Behavior: Behavior normal.     
   Thought Content: Thought content normal.     
   Judgment: Judgment normal.  
 
 
 
Diagnostic Study Results Labs - Recent Results (from the past 12 hour(s)) CBC WITH AUTOMATED DIFF Collection Time: 02/20/21  2:00 PM  
Result Value Ref Range WBC 7.6 3.6 - 11.0 K/uL  
 RBC 3.91 3.80 - 5.20 M/uL  
 HGB 12.7 11.5 - 16.0 g/dL HCT 38.7 35.0 - 47.0 %  MCV 99.0 80.0 - 99.0 FL  
 MCH 32.5 26.0 - 34.0 PG MCHC 32.8 30.0 - 36.5 g/dL  
 RDW 14.3 11.5 - 14.5 % PLATELET 184 666 - 862 K/uL MPV 10.4 8.9 - 12.9 FL  
 NRBC 0.0 0  WBC ABSOLUTE NRBC 0.00 0.00 - 0.01 K/uL NEUTROPHILS 59 32 - 75 % LYMPHOCYTES 22 12 - 49 % MONOCYTES 15 (H) 5 - 13 % EOSINOPHILS 4 0 - 7 % BASOPHILS 0 0 - 1 % IMMATURE GRANULOCYTES 0 0.0 - 0.5 % ABS. NEUTROPHILS 4.5 1.8 - 8.0 K/UL  
 ABS. LYMPHOCYTES 1.6 0.8 - 3.5 K/UL  
 ABS. MONOCYTES 1.1 (H) 0.0 - 1.0 K/UL  
 ABS. EOSINOPHILS 0.3 0.0 - 0.4 K/UL  
 ABS. BASOPHILS 0.0 0.0 - 0.1 K/UL  
 ABS. IMM. GRANS. 0.0 0.00 - 0.04 K/UL  
 DF AUTOMATED METABOLIC PANEL, COMPREHENSIVE Collection Time: 02/20/21  2:00 PM  
Result Value Ref Range Sodium 140 136 - 145 mmol/L Potassium 3.5 3.5 - 5.1 mmol/L Chloride 102 97 - 108 mmol/L  
 CO2 31 21 - 32 mmol/L Anion gap 7 5 - 15 mmol/L Glucose 105 (H) 65 - 100 mg/dL BUN 33 (H) 6 - 20 mg/dL Creatinine 1.79 (H) 0.55 - 1.02 mg/dL BUN/Creatinine ratio 18 12 - 20 GFR est AA 32 (L) >60 ml/min/1.73m2 GFR est non-AA 27 (L) >60 ml/min/1.73m2 Calcium 8.7 8.5 - 10.1 mg/dL Bilirubin, total 0.4 0.2 - 1.0 mg/dL AST (SGOT) 24 15 - 37 U/L  
 ALT (SGPT) 22 12 - 78 U/L Alk. phosphatase 96 45 - 117 U/L Protein, total 6.9 6.4 - 8.2 g/dL Albumin 3.0 (L) 3.5 - 5.0 g/dL Globulin 3.9 2.0 - 4.0 g/dL A-G Ratio 0.8 (L) 1.1 - 2.2    
TROPONIN I Collection Time: 02/20/21  2:00 PM  
Result Value Ref Range Troponin-I, Qt. <0.05 <0.05 ng/mL BNP Collection Time: 02/20/21  2:00 PM  
Result Value Ref Range NT pro- (H) <450 pg/mL MAGNESIUM Collection Time: 02/20/21  2:00 PM  
Result Value Ref Range Magnesium 2.3 1.6 - 2.4 mg/dL EKG, 12 LEAD, INITIAL Collection Time: 02/20/21  2:24 PM  
Result Value Ref Range Ventricular Rate 66 BPM  
 Atrial Rate 66 BPM  
 P-R Interval 124 ms QRS Duration 102 ms Q-T Interval 440 ms QTC Calculation (Bezet) 461 ms  Calculated P Axis 60 degrees Calculated R Axis -30 degrees Calculated T Axis 73 degrees Diagnosis Normal sinus rhythm Left axis deviation Nonspecific ST and T wave abnormality Abnormal ECG When compared with ECG of 04-FEB-2021 12:37, 
Nonspecific T wave abnormality no longer evident in Inferior leads Confirmed by Lianna Rodriguez MD, Dena Medrano ([id number]) on 2/20/2021 4:13:50 PM 
  
URINALYSIS W/ REFLEX CULTURE Collection Time: 02/20/21  4:40 PM  
 Specimen: Urine Result Value Ref Range Color Yellow/Straw Appearance Clear Clear Specific gravity 1.006 1.003 - 1.030    
 pH (UA) 5.0 5.0 - 8.0 Protein Negative Negative mg/dL Glucose Negative Negative mg/dL Ketone Negative Negative mg/dL Bilirubin Negative Negative Blood Negative Negative Urobilinogen 0.1 0.1 - 1.0 EU/dL Nitrites Negative Negative Leukocyte Esterase Negative Negative UA:UC IF INDICATED Culture not indicated by UA result Culture not indicated by UA result WBC 0-4 0 - 4 /hpf  
 RBC 0-5 0 - 5 /hpf Bacteria Negative Negative /hpf Radiologic Studies -  
XR CHEST PORT Final Result Negative for cardiac decompensation, no radiographic evidence of pneumonia. CT Results  (Last 48 hours) None CXR Results  (Last 48 hours) 02/20/21 1402  XR CHEST PORT Final result Impression:  Negative for cardiac decompensation, no radiographic evidence of pneumonia. Narrative:  XR CHEST PORT Comparison: Chest radiograph dated February 4, 2021 Findings: The lungs are adequately inflated without focal consolidation. Cardiac  
silhouette is within normal limits for size, no evidence of cardiac  
decompensation. Eventration right hemidiaphragm. Degenerative change bilateral  
shoulders. Medical Decision Making and ED Course I am the first provider for this patient.  
 
I reviewed the vital signs, available nursing notes, past medical history, past surgical history, family history and social history. Vital Signs-Reviewed the patient's vital signs. Patient Vitals for the past 12 hrs: 
 Temp Pulse Resp BP SpO2  
02/20/21 1633 97.5 °F (36.4 °C) 78 17 (!) 143/98 98 % 02/20/21 1359  69 18 (!) 105/52 95 % 02/20/21 1313 97.4 °F (36.3 °C) 72 18 116/61 98 % EKG interpretation:  
EKG at 1424. Normal sinus rhythm rate of 66. No ST changes. No T wave inversions. Normal intervals. Reason rule out ACS. Interpreted by ER physician. Records Reviewed: Previous Hospital chart. EMS run report ED Course:  
Initial assessment performed. The patients presenting problems have been discussed, and they are in agreement with the care plan formulated and outlined with them. I have encouraged them to ask questions as they arise throughout their visit. Orders Placed This Encounter  XR CHEST PORT Standing Status:   Standing Number of Occurrences:   1 Order Specific Question:   Reason for Exam  
  Answer:   weakness  CBC WITH AUTOMATED DIFF Standing Status:   Standing Number of Occurrences:   1  METABOLIC PANEL, COMPREHENSIVE Standing Status:   Standing Number of Occurrences:   1  TROPONIN I Standing Status:   Standing Number of Occurrences:   1  BNP Standing Status:   Standing Number of Occurrences:   1  MAGNESIUM Standing Status:   Standing Number of Occurrences:   1  URINALYSIS W/ REFLEX CULTURE Standing Status:   Standing Number of Occurrences:   1  EKG 12 LEAD INITIAL Standing Status:   Standing Number of Occurrences:   1 Order Specific Question:   Reason for Exam: Answer:   dizziness  sodium chloride 0.9 % bolus infusion 1,000 mL CONSULTANTS: 
Consults Provider Notes (Medical Decision Making):  
80year-old with generalized weakness secondary to poor oral intake.   She is tolerating p.o. well here in the emergency room with improved blood pressure after fluids. No signs of admission need for kidney failure versus sepsis. Procedures Disposition Emergency Department Disposition:  Discharged Diagnosis Clinical Impression: 1. Dehydration 2. Hypotension, unspecified hypotension type Attestations: 
 
He Emery MD 
 
Please note that this dictation was completed with [employer], the computer voice recognition software. Quite often unanticipated grammatical, syntax, homophones, and other interpretive errors are inadvertently transcribed by the computer software. Please disregard these errors. Please excuse any errors that have escaped final proofreading. Thank you.

## 2021-03-24 PROBLEM — R06.02 SOB (SHORTNESS OF BREATH): Status: ACTIVE | Noted: 2021-01-01

## 2021-03-24 PROBLEM — I25.10 CAD (CORONARY ARTERY DISEASE), NATIVE CORONARY ARTERY: Status: ACTIVE | Noted: 2021-01-01

## 2021-03-24 NOTE — Clinical Note
Lesion: Located in the Proximal LAD. Stent deployed. Multiple inflations used. First inflation pressure = 16 harry; inflation time: 15 sec.

## 2021-03-24 NOTE — Clinical Note
Lesion located in the Proximal LAD. Balloon inserted. Balloon inflated using multiple inflations inflation technique. Lesion #1: Pressure = 14 harry; Duration = 12 sec. Inflation 2: Pressure = 13 harry; Duration = 10 sec.

## 2021-03-24 NOTE — PERIOP NOTES
Dr. Jayson Sweet notified of patient's serum potasium result of 3.2 and that patient is getting ready to be transported to cardiac cath lab. Dr. Jayson Sweet states he will call cardiac cath lab nurse to give orders for potassium.

## 2021-03-24 NOTE — Clinical Note
[facility] Emergency Department    [address] [address]    Phone:  [phone]    Fax:  [phone]                                       Alethea Britt   MRN: [id number]    Department:  [facility] Emergency Department   Date of Visit:  5/28/2017           Patient Information     Date Of Birth          1942        Your diagnoses for this visit were:     Spontaneous ecchymosis        You were seen by Chris Vazquez MD.        Discharge Instructions       OK to observe things at this time.    Have a re check if this area becomes larger, painful, more red or irritated.    24 Hour Appointment Hotline       To make an appointment at any [place] clinic, call [phone] ([phone]). If you don't have a family doctor or clinic, we will help you find one. [place] clinics are conveniently located to serve the needs of you and your family.             Review of your medicines      Our records show that you are taking the medicines listed below. If these are incorrect, please call your family doctor or clinic.        Dose / Directions Last dose taken    acetaminophen 500 MG tablet   Commonly known as:  TYLENOL   Dose:  1000 mg        Take 1,000 mg by mouth every 8 hours as needed for mild pain Rapid release   Refills:  0        acetaminophen-codeine 300-30 MG per tablet   Commonly known as:  TYLENOL #3   Dose:  1 tablet   Quantity:  90 tablet        Take 1 tablet by mouth every 4 hours as needed for pain maximum 10  tablet(s) per day   Refills:  0        ADVIL PO   Dose:  400 mg   Indication:  Mild to Moderate Pain        Take 400 mg by mouth every 6 hours   Refills:  0        azithromycin 250 MG tablet   Commonly known as:  ZITHROMAX   Quantity:  6 tablet        Two tablets first day, then one tablet daily for four days.  Can repeat immediately if you desire.   Refills:  3        budesonide 180 MCG/ACT inhaler   Commonly known as:  PULMICORT FLEXHALER   Dose:  2 puff  Sheath #1: Closed using R-Band. Radial band pressure set at: 8.   Quantity:  1 Inhaler        Inhale 2 puffs into the lungs 2 times daily   Refills:  11        COLACE 100 MG capsule   Quantity:  60   Generic drug:  docusate sodium        1 CAPSULE ORALLY DAILY AS NEEDED   Refills:  0        * ipratropium - albuterol 0.5 mg/2.5 mg/3 mL 0.5-2.5 (3) MG/3ML neb solution   Commonly known as:  DUONEB   Dose:  3 mL   Quantity:  1 Box        Take 1 vial (3 mLs) by nebulization 4 times daily   Refills:  5        * COMBIVENT RESPIMAT  MCG/ACT inhaler   Quantity:  4 g   Generic drug:  Ipratropium-Albuterol        INHALE TWO TO THREE PUFFS INTO THE LUNGS EVERY 6 HOURS   Refills:  11        * order for DME        Oxygen 2.0 -4.0 liters n/c continuous   Refills:  0        * order for DME   Quantity:  1 Device        NEBULIZER machine   Refills:  0        oseltamivir 75 MG capsule   Commonly known as:  TAMIFLU   Dose:  75 mg   Quantity:  10 capsule        Take 1 capsule (75 mg) by mouth daily   Refills:  0        polyethylene glycol powder   Commonly known as:  MIRALAX/GLYCOLAX   Dose:  1 capful        Take 1 capful by mouth daily as needed for constipation   Refills:  0        predniSONE 20 MG tablet   Commonly known as:  DELTASONE   Quantity:  14 tablet        Take 2 pills (40 mg) daily x 3 days, then 1.5 pills (30 mg) daily x 3 days, then 1 pill (20 mg) daily x 3 days, then 1/2 pill (10 mg) daily x 3 days.   Refills:  1        ranitidine 150 MG tablet   Commonly known as:  ZANTAC   Quantity:  60 tablet        Take 1 tab 1-2 times per day.   Refills:  11        valACYclovir 1000 mg tablet   Commonly known as:  VALTREX   Dose:  1000 mg   Quantity:  21 tablet        Take 1 tablet (1,000 mg) by mouth 3 times daily for 7 days   Refills:  0        vitamin D 1000 UNITS capsule   Dose:  2 capsule        Take 2 capsules by mouth daily   Refills:  0        * Notice:  This list has 4 medication(s) that are the same as other medications prescribed for you. Read the directions carefully, and ask  your doctor or other care provider to review them with you.            Procedures and tests performed during your visit     Tib/Fib XR, right      Orders Needing Specimen Collection     None      Pending Results     No orders found from 5/26/2017 to 5/29/2017.            Pending Culture Results     No orders found from 5/26/2017 to 5/29/2017.            Pending Results Instructions     If you had any lab results that were not finalized at the time of your Discharge, you can call the ED Lab Result RN at [phone]. You will be contacted by this team for any positive Lab results or changes in treatment. The nurses are available 7 days a week from 10A to 6:30P.  You can leave a message 24 hours per day and they will return your call.        Test Results From Your Hospital Stay        5/28/2017 11:44 AM      Narrative     XR TIBIA & FIBULA RT 2 VW 5/28/2017 11:41 AM    COMPARISON: None.    HISTORY: Circular ecchymotic area distally, history of bone  metastases.        Impression     IMPRESSION: No fractures are seen in the right tibia or fibula. No  concerning lesions identified. The visualized joints are unremarkable.    FERCHO BOONE                Thank you for choosing [facility]       Thank you for choosing [facility] for your care. Our goal is always to provide you with excellent care. Hearing back from our patients is one way we can continue to improve our services. Please take a few minutes to complete the written survey that you may receive in the mail after you visit with us. Thank you!        [facility] Information     [facility] gives you secure access to your electronic health record. If you see a primary care provider, you can also send messages to your care team and make appointments. If you have questions, please call your primary care clinic.  If you do not have a primary care provider, please call [phone] and they will assist you.        Care EveryWhere ID     This is your Care EveryWhere ID. This could be  used by other organizations to access your [facility] medical records  [id number]        After Visit Summary       This is your record. Keep this with you and show to your community pharmacist(s) and doctor(s) at your next visit.

## 2021-03-24 NOTE — Clinical Note
TRANSFER - OUT REPORT:     Verbal report given to: Jessica. Report consisted of patient's Situation, Background, Assessment and   Recommendations(SBAR). Opportunity for questions and clarification was provided. Patient transported with a Registered Nurse. Patient transported to: PACU.

## 2021-03-25 NOTE — DISCHARGE INSTRUCTIONS
Patient Education        Coronary Angioplasty: What to Expect at Home  Your Recovery     Coronary angioplasty is a procedure that is used to open a narrowed or blocked coronary artery. It may also be called a percutaneous coronary intervention (PCI). The doctor opened your narrowed or blocked artery by putting a thin tube, called a catheter, into your heart through a blood vessel. The catheter was inserted into the blood vessel in your groin or arm. Your groin or arm may have a bruise and feel sore for a day or two after the procedure. You can do light activities around the house. But don't do anything strenuous for several days. This care sheet gives you a general idea about how long it will take for you to recover. But each person recovers at a different pace. Follow the steps below to get better as quickly as possible. How can you care for yourself at home? Activity    · If the doctor gave you a sedative:  ? For 24 hours, don't do anything that requires attention to detail, such as going to work, making important decisions, or signing any legal documents. It takes time for the medicine's effects to completely wear off.  ? For your safety, do not drive or operate any machinery that could be dangerous. Wait until the medicine wears off and you can think clearly and react easily.     · Do not do strenuous exercise and do not lift, pull, or push anything heavy until your doctor says it is okay. This may be for a day or two. You can walk around the house and do light activity, such as cooking.     · If the catheter was placed in your groin, try not to walk up stairs for the first couple of days.     · If the catheter was placed in your arm near your wrist, do not bend your wrist deeply for the first couple of days. Be careful using your hand to get into and out of a chair or bed.     · Carry your stent identification card with you at all times.     · If your doctor recommends it, get more exercise.  Walking is a good choice. Bit by bit, increase the amount you walk every day. Try for at least 30 minutes on most days of the week. Diet    · Drink plenty of fluids to help your body flush out the dye. If you have kidney, heart, or liver disease and have to limit fluids, talk with your doctor before you increase the amount of fluids you drink.     · Keep eating a heart-healthy diet that has lots of fruits, vegetables, and whole grains. If you have not been eating this way, talk to your doctor. You also may want to talk to a dietitian. This expert can help you to learn about healthy foods and plan meals. Medicines    · Your doctor will tell you if and when you can restart your medicines. He or she will also give you instructions about taking any new medicines.     · If you take aspirin or some other blood thinner, ask your doctor if and when to start taking it again. Make sure that you understand exactly what your doctor wants you to do.     · Your doctor will prescribe blood-thinning medicines. You will likely take aspirin plus another antiplatelet, such as clopidogrel (Plavix). It is very important that you take these medicines exactly as directed. These medicines help keep the coronary artery open and reduce your risk of a heart attack.     · Call your doctor if you think you are having a problem with your medicine. Care of the catheter site    · For 1 or 2 days, keep a bandage over the spot where the catheter was inserted. The bandage probably will fall off in this time.     · Put ice or a cold pack on the area for 10 to 20 minutes at a time to help with soreness or swelling. Put a thin cloth between the ice and your skin.     · You may shower 24 to 48 hours after the procedure, if your doctor okays it. Pat the incision dry.     · Do not soak the catheter site until it is healed. Don't take a bath for 1 week, or until your doctor tells you it is okay.     · Watch for bleeding from the site.  A small amount of blood (up to the size of a quarter) on the bandage can be normal.     · If you are bleeding, lie down and press on the area for 15 minutes to try to make it stop. If the bleeding does not stop, call your doctor or seek immediate medical care. Follow-up care is a key part of your treatment and safety. Be sure to make and go to all appointments, and call your doctor if you are having problems. It's also a good idea to know your test results and keep a list of the medicines you take. When should you call for help? Call 911 anytime you think you may need emergency care. For example, call if:    · You passed out (lost consciousness).     · You have severe trouble breathing.     · You have sudden chest pain and shortness of breath, or you cough up blood.     · You have symptoms of a heart attack, such as:  ? Chest pain or pressure. ? Sweating. ? Shortness of breath. ? Nausea or vomiting. ? Pain that spreads from the chest to the neck, jaw, or one or both shoulders or arms. ? Dizziness or lightheadedness. ? A fast or uneven pulse. After calling 911, chew 1 adult-strength aspirin. Wait for an ambulance. Do not try to drive yourself.     · You have been diagnosed with angina, and you have angina symptoms that do not go away with rest or are not getting better within 5 minutes after you take one dose of nitroglycerin. Call your doctor now or seek immediate medical care if:    · You are bleeding from the area where the catheter was put in your artery.     · You have a fast-growing, painful lump at the catheter site.     · You have signs of infection, such as:  ? Increased pain, swelling, warmth, or redness. ? Red streaks leading from the catheter site. ? Pus draining from the catheter site. ? A fever.     · Your leg, arm, or hand is painful, looks blue, or feels cold, numb, or tingly. Watch closely for changes in your health, and be sure to contact your doctor if you have any problems. Where can you learn more?   Go to http://www.gray.com/  Enter J599 in the search box to learn more about \"Coronary Angioplasty: What to Expect at Home. \"  Current as of: December 16, 2019               Content Version: 12.6  © 7898-5391 [employer]. Care instructions adapted under license by [employer] (which disclaims liability or warranty for this information). If you have questions about a medical condition or this instruction, always ask your healthcare professional. [address] any warranty or liability for your use of this information.

## 2021-03-25 NOTE — DISCHARGE SUMMARY
Patient ID: 
Jaylon Castellano 
[id number] 
74 y.o. 
8/3/1930 Allergies: Patient has no known allergies. Admit Date: 3/24/2021 Discharge Date: 3/25/2021 Admitting Physician: Taz Mancilla MD  
 
Discharge Physician: Magnus Spears NP 
 
* Admission Diagnoses: SOB (shortness of breath) [R06.02] 
CAD (coronary artery disease), native coronary artery [I25.10] * Discharge Diagnoses:  
Hospital Problems as of 3/25/2021 Date Reviewed: 2/4/2021 Codes Class Noted - Resolved POA  
 SOB (shortness of breath) ICD-10-CM: R06.02 
ICD-9-CM: 786.05  3/24/2021 - Present Unknown CAD (coronary artery disease), native coronary artery ICD-10-CM: I25.10 ICD-9-CM: 414.01  3/24/2021 - Present Unknown * Discharge Condition: stable * Cardiology Procedures this Admission:  Left heart catheterization with PCI [facility] Course: Patient was admitted for observation after scheduled outpatient left heart cath with stent placement to the proximal LAD, IFR 0.78. Chart reviewed. Vital signs stable. EKG without acute changes post PCI. Labs assessed and potassium repleted. Right radial access site with dressing CDI, 2+ radial pulse, no pain or hematoma. Does have a small hematoma the posterior right hand from injury during transfer to the bed. Patient and family advised to resume all home meds this evening. Cardiology follow up has been arranged. Cardiac catheterization 3/24/21:  
· Status post PCI proximal LAD for IFR of 0.78 with single drug eluting stent. · Continue medical managment for diagonal one branch disease. · Continue DAPT and work up for severe aortic stenosis. Lower extremity venous duplex 3/24/21 · No evidence of acute deep vein thrombosis in the right common femoral, superficial femoral, popliteal, posterior tibial, and peroneal veins. The veins were imaged in the transverse and longitudinal planes.  The vessels showed normal color filling and compressibility. Doppler interrogation showed phasic and spontaneous flow. · No evidence of acute deep vein thrombosis in the left common femoral, superficial femoral, popliteal, posterior tibial, and peroneal veins. The veins were imaged in the transverse and longitudinal planes. The vessels showed normal color filling and compressibility. Doppler interrogation showed phasic and spontaneous flow. Discharge Exam:   Alert, oriented. Hard of hearing. No acute distress Heart: Normal rate and rhythm, S1, S2 normal, harsh systolic murmur noted Resp: lungs clear to dim bilaterally Extremities: No edema Pulses: intact right radial 2+ Skin: small hematoma to the right hand * Disposition: Home Discharge Medications:  
Current Discharge Medication List  
  
 
 
* Follow-up Care/Patient Instructions: Activity:Activity as tolerated and no driving for today Diet: Cardiac Diet Wound Care: Keep wound clean and dry Cardiac Catheterization/Angiography Discharge Instructions *Check the puncture site frequently for swelling or bleeding. If you see any bleeding, lie down and apply pressure over the area with a clean town or washcloth. Notify your doctor for any redness, swelling, drainage or oozing from the puncture site. Notify your doctor for any fever or chills. *If the leg or arm with the puncture becomes cold, numb or painful, call Dr Rhonda Solomon at  [phone]. *Activity should be limited for the next 48 hours. Climb stairs as little as possible and avoid any stooping, bending or strenuous activity for 48 hours. No heavy lifting (anything over 10 pounds) for three days. *Do not drive for 48 hours. *You may resume your usual diet. Drink more fluids than usual. 
*Have a responsible person drive you home and stay with you for at least 24 hours after your heart catheterization/angiography. *You may remove the bandage from your Right Arm in 24 hours. You may shower in 24 hours.  No tub baths, hot tubs or swimming for one week. Do not place any lotions, creams, powders, ointments over the puncture site for one week. You may place a clean band-aid over the puncture site each day for 5 days. Change this daily. Follow-up Information Follow up With Specialties Details Why Contact Info Paulo Weathers MD Cardiology On 4/9/2021 @ 2pm for cath follow up and test results with Pari Iyer NP . [place] [address] [address] [facility] [address] 
[phone] Signed: 
Praveen Chan NP 
3/25/2021 
10:46 AM 
 .

## 2021-03-25 NOTE — PROGRESS NOTES
Discharge plan of care/case management plan validated with provider discharge order. I have reviewed discharge instructions with the patient. The patient verbalized understanding. Discharged completed with daughter in room. IV removed with tip intact. Telemetry removed.

## 2021-03-25 NOTE — PROGRESS NOTES
Provided patient education about and explained the importance of medication compliance. Patient stated that she can afford the antiplatelet medication prescribed. Patient was advised that if the antiplatelet medication becomes unaffordable, she must notify the cardiologist immediately so that an alternate plan for antiplatelet therapy can be made. Patient stated that she will fill this prescription before or upon discharge so that it will be available for the next scheduled dose after discharge. Patient asked appropriate questions and verbalized understanding during this consultation and was given printed teaching materials and my contact information in case I can provide further assistance. Phase 2 outpatient cardiac rehab referral is not appropriate for this patient at this time due to physical and/or mental limitations, diagnosis and/or treatment plans.

## 2021-12-17 PROBLEM — J12.82 PNEUMONIA DUE TO COVID-19 VIRUS: Status: ACTIVE | Noted: 2021-01-01

## 2021-12-17 PROBLEM — U07.1 PNEUMONIA DUE TO COVID-19 VIRUS: Status: ACTIVE | Noted: 2021-01-01

## 2021-12-17 PROBLEM — R09.02 HYPOXIA: Status: ACTIVE | Noted: 2021-01-01

## 2021-12-17 NOTE — Clinical Note
Status[de-identified] INPATIENT [101]   Type of Bed: Remote Telemetry [29]   Cardiac Monitoring Required?: Yes   Inpatient Hospitalization Certified Necessary for the Following Reasons: 7. Inpatient psychiatric hospital admission is medically necessary for treatment which can reasonably be expected to improve the patients condition and/or for diagnostic study.    Admitting Diagnosis: Pneumonia due to COVID-19 virus [[id number]]   Admitting Diagnosis: Hypoxia [213242]   Admitting Physician: Elfego Tse [[id number]]   Attending Physician: Elfego Tse [[id number]]   Estimated Length of Stay: 2 Midnights   Discharge Plan[de-identified] Home with Office Follow-up

## 2021-12-17 NOTE — ED TRIAGE NOTES
Pt here from the [address] sent over for a CXR because of a dry persistent cough that has been going on for  Couple of days.   Pt denies pain  Pt has some swelling to her right lower leg  Typically uses an assistive device to ambulate  AO x's 4

## 2021-12-18 NOTE — ED NOTES
His nurse call the nurse station at [address] and spoke to Angelica Nguyễn and made her aware of Pt's covid status and admission dispostion.

## 2021-12-18 NOTE — PROGRESS NOTES
Reason for Admission:  PNA                     RUR Score:   13%                  Plan for utilizing home health:      None at this time    PCP: First and Last name:  Doctor at [facility]   Name of Practice:    Are you a current patient: Yes/No:    Approximate date of last visit:    Can you participate in a virtual visit with your PCP:                     Current Advanced Directive/Advance Care Plan: Full Code      Healthcare Decision Maker:   Click here to complete [address] including selection of the Healthcare Decision Maker Relationship (ie \"Primary\")             Primary Decision Maker: Jamil Neal - Daughter - [phone]    Secondary Decision Maker: Arianna Sloan - Daughter - [phone]                  Transition of Care Plan:          Return to [address] spoke with patient's daughter, Kristine Ahn, to complete DCP assessment. She reported that patient currently lives at [address] [place] [address] and has lived there since August of this year. Patient uses a rollator or walker to ambulate, no home O2 at this time. DCP is for patient to return to [facility] once medically stable. CM continues to follow.

## 2021-12-18 NOTE — ED NOTES
Pt resting comfortably at this time, daughter still at the bedside.  Pt's cough has improved dramatically

## 2021-12-18 NOTE — PROGRESS NOTES
Hospitalist Progress Note    Subjective:   Daily Progress Note: 12/18/2021 9:21 AM    Hospital Course: Patient is a 80-year-old female with a history of dementia, hypertension, hyperlipidemia from [place] [address] that presented to the emergency room on 12/17/2021 for a 4-day history of cough. She was exposed to Covid. Rapid Covid positive. Chest x-ray shows signs concerning for pneumonia. Patient was placed on IV Decadron and azithromycin. Subjective: Patient resting comfortably. Says that she is feeling better. With a mild cough.     Current Facility-Administered Medications   Medication Dose Route Frequency    sodium chloride (NS) flush 5-40 mL  5-40 mL IntraVENous Q8H    sodium chloride (NS) flush 5-40 mL  5-40 mL IntraVENous PRN    acetaminophen (TYLENOL) tablet 650 mg  650 mg Oral Q6H PRN    Or    acetaminophen (TYLENOL) suppository 650 mg  650 mg Rectal Q6H PRN    ondansetron (ZOFRAN ODT) tablet 4 mg  4 mg Oral Q6H PRN    Or    ondansetron (ZOFRAN) injection 4 mg  4 mg IntraVENous Q6H PRN    enoxaparin (LOVENOX) injection 30 mg  30 mg SubCUTAneous Q24H    cholecalciferol (VITAMIN D3) (1000 Units /25 mcg) tablet 2,000 Units  2,000 Units Oral DAILY    aspirin delayed-release tablet 81 mg  81 mg Oral DAILY    pantoprazole (PROTONIX) tablet 40 mg  40 mg Oral ACB    clopidogreL (PLAVIX) tablet 75 mg  75 mg Oral DAILY    mirtazapine (REMERON) tablet 15 mg  15 mg Oral QHS    melatonin tablet 10 mg  10 mg Oral QHS    levothyroxine (SYNTHROID) tablet 75 mcg  75 mcg Oral ACB    azithromycin (ZITHROMAX) 500 mg in 0.9% sodium chloride 250 mL (VIAL-MATE)  500 mg IntraVENous Q24H    dexamethasone (DECADRON) 4 mg/mL injection 4 mg  4 mg IntraVENous Q8H    zinc sulfate (ZINCATE) 50 mg zinc (220 mg) capsule 1 Capsule  1 Capsule Oral DAILY    ascorbic acid (vitamin C) (VITAMIN C) tablet 250 mg  250 mg Oral BID        Review of Systems  Constitutional: No fevers, No chills, No sweats, +fatigue, No Weakness  Eyes: No redness  Ears, nose, mouth, throat, and face: No nasal congestion, No sore throat, No voice change  Respiratory: + Shortness of Breath, + cough, No wheezing  Cardiovascular: No chest pain, No palpitations, No extremity edema  Gastrointestinal: No nausea, No vomiting, No diarrhea, No abdominal pain  Genitourinary: No frequency, No dysuria, No hematuria  Integument/breast: No skin lesion(s)   Neurological: + Confusion, No headaches, No dizziness      Objective:     Visit Vitals  BP (!) 151/82 (BP 1 Location: Left upper arm, BP Patient Position: At rest)   Pulse 64   Temp 97.6 °F (36.4 °C)   Resp 21   Ht 5' (1.524 m)   Wt 61.2 kg (135 lb)   SpO2 98%   BMI 26.37 kg/m²    O2 Flow Rate (L/min): 2 l/min O2 Device: Nasal cannula    Temp (24hrs), Av.3 °F (36.8 °C), Min:97.6 °F (36.4 °C), Max:99.6 °F (37.6 °C)      No intake/output data recorded. No intake/output data recorded. PHYSICAL EXAM:  Constitutional: No acute distress  Skin: Extremities and face reveal no rashes. HEENT: Sclerae anicteric. Extra-occular muscles are intact. No oral ulcers. The neck is supple and no masses. Cardiovascular: Regular rate and rhythm. Respiratory: nonalbored, diminshed  GI: Abdomen nondistended, soft, and nontender. Normal active bowel sounds. Musculoskeletal: No pitting edema of the lower legs. Able to move all ext  Neurological:  Patient is alert and oriented.  Cranial nerves II-XII grossly intact  Psychiatric: Mood appears appropriate       Data Review    Recent Results (from the past 24 hour(s))   CBC WITH AUTOMATED DIFF    Collection Time: 21  7:15 PM   Result Value Ref Range    WBC 6.3 3.6 - 11.0 K/uL    RBC 3.63 (L) 3.80 - 5.20 M/uL    HGB 12.2 11.5 - 16.0 g/dL    HCT 36.7 35.0 - 47.0 %    .1 (H) 80.0 - 99.0 FL    MCH 33.6 26.0 - 34.0 PG    MCHC 33.2 30.0 - 36.5 g/dL    RDW 14.4 11.5 - 14.5 %    PLATELET 108 (L) 909 - 400 K/uL    MPV 9.7 8.9 - 12.9 FL    NEUTROPHILS 68 32 - 75 % LYMPHOCYTES 20 12 - 49 %    MONOCYTES 9 5 - 13 %    EOSINOPHILS 3 0 - 7 %    BASOPHILS 0 0 - 1 %    IMMATURE GRANULOCYTES 0 0.0 - 0.5 %    ABS. NEUTROPHILS 4.2 1.8 - 8.0 K/UL    ABS. LYMPHOCYTES 1.2 0.8 - 3.5 K/UL    ABS. MONOCYTES 0.6 0.0 - 1.0 K/UL    ABS. EOSINOPHILS 0.2 0.0 - 0.4 K/UL    ABS. BASOPHILS 0.0 0.0 - 0.1 K/UL    ABS. IMM. GRANS. 0.0 0.00 - 0.04 K/UL    DF AUTOMATED     INFLUENZA A & B AG (RAPID TEST)    Collection Time: 12/17/21  7:15 PM   Result Value Ref Range    Influenza A Antigen Negative Negative      Influenza B Antigen Negative Negative     LACTIC ACID    Collection Time: 12/17/21  7:15 PM   Result Value Ref Range    Lactic acid 1.3 0.4 - 2.0 mmol/L   HEPATIC FUNCTION PANEL    Collection Time: 12/17/21  7:15 PM   Result Value Ref Range    Protein, total 6.9 6.4 - 8.2 g/dL    Albumin 3.2 (L) 3.5 - 5.0 g/dL    Globulin 3.7 2.0 - 4.0 g/dL    A-G Ratio 0.9 (L) 1.1 - 2.2      Bilirubin, total 0.5 0.2 - 1.0 mg/dL    Bilirubin, direct 0.2 0.0 - 0.2 mg/dL    Alk.  phosphatase 75 45 - 117 U/L    AST (SGOT) 42 (H) 15 - 37 U/L    ALT (SGPT) 22 12 - 78 U/L   METABOLIC PANEL, BASIC    Collection Time: 12/17/21  7:15 PM   Result Value Ref Range    Sodium 140 136 - 145 mmol/L    Potassium 3.4 (L) 3.5 - 5.1 mmol/L    Chloride 103 97 - 108 mmol/L    CO2 24 21 - 32 mmol/L    Anion gap 13 5 - 15 mmol/L    Glucose 96 65 - 100 mg/dL    BUN 29 (H) 6 - 20 mg/dL    Creatinine 1.53 (H) 0.55 - 1.02 mg/dL    BUN/Creatinine ratio 19 12 - 20      GFR est AA 39 (L) >60 ml/min/1.73m2    GFR est non-AA 32 (L) >60 ml/min/1.73m2    Calcium 8.4 (L) 8.5 - 10.1 mg/dL   SARS-COV-2    Collection Time: 12/17/21  7:18 PM   Result Value Ref Range    SARS-CoV-2 Please find results under separate order     SARS-COV-2    Collection Time: 12/17/21  7:18 PM   Result Value Ref Range    SARS-CoV-2 DETECTED (A) Not Detected     PROTHROMBIN TIME + INR    Collection Time: 12/18/21  6:03 AM   Result Value Ref Range    Prothrombin time 14.1 11.9 - 14.7 sec    INR 1.1 0.9 - 1.1     PTT    Collection Time: 12/18/21  6:03 AM   Result Value Ref Range    aPTT 32.9 21.2 - 34.1 sec    aPTT, therapeutic range   82 - 109 sec   FIBRINOGEN    Collection Time: 12/18/21  6:03 AM   Result Value Ref Range    Fibrinogen 385 220 - 535 mg/dL   PROCALCITONIN    Collection Time: 12/18/21  6:03 AM   Result Value Ref Range    Procalcitonin 0.19 (H) 0 ng/mL   LD    Collection Time: 12/18/21  6:03 AM   Result Value Ref Range     (H) 81 - 246 U/L       Radiology review: Chest xray    Assessment:   1. Acute hypoxic respiratory failure secondary to Covid pneumonia  2. Hypertension  3. Hypothyroidism  4. Dementia without behavioral disturbances    Plan:    1. We will add on IV azithromycin, IV Decadron, zinc, vitamin C. Chest x-ray reviewed. Wean oxygen. Supportive therapy  2. Blood pressure is elevated. Continue to monitor per unit protocol. Patient had a hypotensive episode. We will add on IV hydralazine as needed. 3.  Continue with Synthroid  4. Continue Remeron. 5.  CBC, BMP, LDH, ferritin, CRP in the a.m.  6.  PT OT      Dispo: 48 hours. Barriers include weaning of oxygen and overall improvement of Covid pneumonia. Most likely will go back to [employer]. CODE STATUS Full     DVT prophylaxis: Loveonx  Ulcer prophylaxis: Protonix    Care Plan discussed with: Patient/Family, Nurse and     Total time spent with patient: 34 minutes.

## 2021-12-18 NOTE — ED NOTES
Pt still coughing, daughter is the bdside and request something to help with the cough.  Dr. Giovanni Grayson made aware

## 2021-12-18 NOTE — ED PROVIDER NOTES
EMERGENCY DEPARTMENT HISTORY AND PHYSICAL EXAM      Date: 12/17/2021  Patient Name: Derian Fontana    History of Presenting Illness       History Provided By: Patient and daughter    HPI: Derian Fontana, 80 y.o. female presents to the ED with cc of coughing or shortness of breath. Patient who is a resident of a assisted living complains of coughing \"for long time\". Daughter states the patient has been coughing for last 4 days. Daughter states that an employee at the assisted living place was tested positive for COVID-19. Patient was tested negative for Covid as per daughter. Patient is a difficult historian due to dementia and hearing loss. Patient denies chest pain, abdominal pain, or headache. No vomiting. Patient received 2 Covid vaccination without booster. Past History     Past Medical History:  Past Medical History:   Diagnosis Date    Hard of hearing     Hypercholesterolemia     Hypertension     Ill-defined condition     memory loss, possible early dementia per daughter    [facility]roke [facility])     Thyroid disease        Past Surgical History:  Past Surgical History:   Procedure Laterality Date    HX CHOLECYSTECTOMY      HX HYSTERECTOMY      HX KNEE REPLACEMENT Bilateral     ME CARDIAC SURG PROCEDURE UNLIST      stents placed to carotid artery       Family History:  Family History   Problem Relation Age of Onset    Hypertension Mother     Cancer Father        Social History:  Social History     Tobacco Use    Smoking status: Never Smoker    Smokeless tobacco: Never Used   Vaping Use    Vaping Use: Never used   Substance Use Topics    Alcohol use: No    Drug use: No       Allergies:  No Known Allergies      Review of Systems   Review of Systems   Unable to perform ROS: Dementia   Constitutional: Negative for chills and fever. Respiratory: Positive for cough. Negative for shortness of breath. Cardiovascular: Negative for chest pain. Gastrointestinal: Negative for abdominal pain. Genitourinary: Negative for flank pain. Skin: Negative for rash. Neurological: Negative for headaches. Physical Exam   Physical Exam  Vitals and nursing note reviewed. Constitutional:       Appearance: Normal appearance. HENT:      Head: Normocephalic and atraumatic. Nose: Nose normal.      Mouth/Throat:      Mouth: Mucous membranes are moist.      Pharynx: Oropharynx is clear. Eyes:      Conjunctiva/sclera: Conjunctivae normal.   Cardiovascular:      Rate and Rhythm: Normal rate and regular rhythm. Heart sounds: Normal heart sounds. Pulmonary:      Effort: Pulmonary effort is normal.      Breath sounds: Rhonchi present. Abdominal:      General: Abdomen is flat. Bowel sounds are normal.      Palpations: Abdomen is soft. Musculoskeletal:      Cervical back: Neck supple. Comments: Bilateral   Skin:     General: Skin is warm and dry. Neurological:      General: No focal deficit present. Mental Status: She is alert and oriented to person, place, and time. Psychiatric:         Mood and Affect: Mood normal.         Diagnostic Study Results     Labs -     Recent Results (from the past 12 hour(s))   CBC WITH AUTOMATED DIFF    Collection Time: 12/17/21  7:15 PM   Result Value Ref Range    WBC 6.3 3.6 - 11.0 K/uL    RBC 3.63 (L) 3.80 - 5.20 M/uL    HGB 12.2 11.5 - 16.0 g/dL    HCT 36.7 35.0 - 47.0 %    .1 (H) 80.0 - 99.0 FL    MCH 33.6 26.0 - 34.0 PG    MCHC 33.2 30.0 - 36.5 g/dL    RDW 14.4 11.5 - 14.5 %    PLATELET 630 (L) 768 - 400 K/uL    MPV 9.7 8.9 - 12.9 FL    NEUTROPHILS 68 32 - 75 %    LYMPHOCYTES 20 12 - 49 %    MONOCYTES 9 5 - 13 %    EOSINOPHILS 3 0 - 7 %    BASOPHILS 0 0 - 1 %    IMMATURE GRANULOCYTES 0 0.0 - 0.5 %    ABS. NEUTROPHILS 4.2 1.8 - 8.0 K/UL    ABS. LYMPHOCYTES 1.2 0.8 - 3.5 K/UL    ABS. MONOCYTES 0.6 0.0 - 1.0 K/UL    ABS. EOSINOPHILS 0.2 0.0 - 0.4 K/UL    ABS. BASOPHILS 0.0 0.0 - 0.1 K/UL    ABS. IMM.  GRANS. 0.0 0.00 - 0.04 K/UL    DF AUTOMATED INFLUENZA A & B AG (RAPID TEST)    Collection Time: 12/17/21  7:15 PM   Result Value Ref Range    Influenza A Antigen Negative Negative      Influenza B Antigen Negative Negative     LACTIC ACID    Collection Time: 12/17/21  7:15 PM   Result Value Ref Range    Lactic acid 1.3 0.4 - 2.0 mmol/L   HEPATIC FUNCTION PANEL    Collection Time: 12/17/21  7:15 PM   Result Value Ref Range    Protein, total 6.9 6.4 - 8.2 g/dL    Albumin 3.2 (L) 3.5 - 5.0 g/dL    Globulin 3.7 2.0 - 4.0 g/dL    A-G Ratio 0.9 (L) 1.1 - 2.2      Bilirubin, total 0.5 0.2 - 1.0 mg/dL    Bilirubin, direct 0.2 0.0 - 0.2 mg/dL    Alk. phosphatase 75 45 - 117 U/L    AST (SGOT) 42 (H) 15 - 37 U/L    ALT (SGPT) 22 12 - 78 U/L   METABOLIC PANEL, BASIC    Collection Time: 12/17/21  7:15 PM   Result Value Ref Range    Sodium 140 136 - 145 mmol/L    Potassium 3.4 (L) 3.5 - 5.1 mmol/L    Chloride 103 97 - 108 mmol/L    CO2 24 21 - 32 mmol/L    Anion gap 13 5 - 15 mmol/L    Glucose 96 65 - 100 mg/dL    BUN 29 (H) 6 - 20 mg/dL    Creatinine 1.53 (H) 0.55 - 1.02 mg/dL    BUN/Creatinine ratio 19 12 - 20      GFR est AA 39 (L) >60 ml/min/1.73m2    GFR est non-AA 32 (L) >60 ml/min/1.73m2    Calcium 8.4 (L) 8.5 - 10.1 mg/dL   SARS-COV-2    Collection Time: 12/17/21  7:18 PM   Result Value Ref Range    SARS-CoV-2 Please find results under separate order     SARS-COV-2    Collection Time: 12/17/21  7:18 PM   Result Value Ref Range    SARS-CoV-2 DETECTED (A) Not Detected         Radiologic Studies -   [unfilled]  CT Results  (Last 48 hours)    None        CXR Results  (Last 48 hours)               12/17/21 1901  XR CHEST PORT Final result    Impression:  Subtle bilateral pulmonary parenchymal opacities. Please see above. Narrative:  Exam: AP chest       Comparison: None. Number of views: 1       Findings: The cardiac, mediastinal, and hilar shadows are within normal limits   for technique. The exam is negative for evidence of  pleural disease.  Subtle evidence of bilateral scattered pulmonary parenchymal airspace opacities may   indicate atypical viral pneumonia such as that rendered by Covid 19 infection,   if clinically correlated. Medical Decision Making and ED Course   I am the first provider for this patient. I reviewed the vital signs, available nursing notes, past medical history, past surgical history, family history and social history. Vital Signs-Reviewed the patient's vital signs. Records Reviewed: Nursing note reviewed      ED Course:   Initial assessment performed. The patients presenting problems have been discussed, and they are in agreement with the care plan formulated and outlined with them. I have encouraged them to ask questions as they arise throughout their visit. No respiratory distress. Patient is saturating well above 95% with O2 2 L. Procedures     I discussed with hospitalist for admission    Chelsey Melvin MD                Disposition     Admitted      DISCHARGE PLAN:  1. Current Discharge Medication List      CONTINUE these medications which have NOT CHANGED    Details   clopidogreL (PLAVIX) 75 mg tab Take 75 mg by mouth daily. simvastatin (ZOCOR) 40 mg tablet Take 40 mg by mouth nightly.      ergocalciferol (Vitamin D2) 1,250 mcg (50,000 unit) capsule Take 50,000 Units by mouth every seven (7) days. furosemide (LASIX) 40 mg tablet Take 40 mg by mouth daily. mirtazapine (REMERON) 15 mg tablet Take 15 mg by mouth nightly. melatonin 5 mg tablet Take 10 mg by mouth nightly. esomeprazole (NexIUM) 20 mg capsule Take 40 mg by mouth daily. valsartan (DIOVAN) 160 mg tablet Take 1 Tab by mouth daily. Qty: 30 Tab, Refills: 1    Associated Diagnoses: Essential hypertension      loratadine (CLARITIN) 10 mg tablet Take 10 mg by mouth. aspirin delayed-release 81 mg tablet Take 81 mg by mouth daily.       levothyroxine (SYNTHROID) 88 mcg tablet Take 88 mcg by mouth Daily (before breakfast). 2.   Follow-up Information     Follow up With Specialties Details Why Contact Info    Follow up with your primary care physician  Schedule an appointment as soon as possible for a visit in 3 days As needed         3. Return to ED if worse     Diagnosis     Clinical Impression:   1. Pneumonia due to COVID-19 virus    2. Hypoxia        Attestations:    Kirt Ogden MD    Please note that this dictation was completed with ePatientFinder, the computer voice recognition software. Quite often unanticipated grammatical, syntax, homophones, and other interpretive errors are inadvertently transcribed by the computer software. Please disregard these errors. Please excuse any errors that have escaped final proofreading. Thank you.

## 2021-12-18 NOTE — PROGRESS NOTES
Admission skin Assessment      Admission skin assessment was completed with Ciarra FAUSTIN Patient has redness  to the right heel and on bilateral in lateral foot. MASD to bilateral groin and buttocks. wound to the right leg.

## 2021-12-19 NOTE — WOUND CARE
IP WOUND CONSULT    [address] RECORD NUMBER:  [id number]  AGE: 80 y.o. GENDER: female  : 8/3/1930  TODAY'S DATE:  2021    GENERAL     [] Follow-up   [x] New Consult    Manjeet Reaves is a 80 y.o. female referred by:   [] Physician  [x] Nursing  [] Other:         PAST MEDICAL HISTORY    Past Medical History:   Diagnosis Date    Dementia ([address]     Hard of hearing     Hypercholesterolemia     Hypertension     Ill-defined condition     memory loss, possible early dementia per daughter    [facility]roke [facility])     Thyroid disease         PAST SURGICAL HISTORY    Past Surgical History:   Procedure Laterality Date    HX CHOLECYSTECTOMY      HX HYSTERECTOMY      HX KNEE REPLACEMENT Bilateral     MD CARDIAC SURG PROCEDURE UNLIST      stents placed to carotid artery       FAMILY HISTORY    Family History   Problem Relation Age of Onset    Hypertension Mother     Cancer Father          ALLERGIES    No Known Allergies    MEDICATIONS    No current facility-administered medications on file prior to encounter. Current Outpatient Medications on File Prior to Encounter   Medication Sig Dispense Refill    levothyroxine (Synthroid) 75 mcg tablet Take 75 mcg by mouth Daily (before breakfast).  clopidogreL (PLAVIX) 75 mg tab Take 75 mg by mouth daily.  simvastatin (ZOCOR) 40 mg tablet Take 40 mg by mouth nightly.  furosemide (LASIX) 40 mg tablet Take 20 mg by mouth daily.  mirtazapine (REMERON) 15 mg tablet Take 15 mg by mouth nightly.  melatonin 5 mg tablet Take 10 mg by mouth nightly.  aspirin delayed-release 81 mg tablet Take 81 mg by mouth daily.  esomeprazole (NexIUM) 20 mg capsule Take 40 mg by mouth daily. [unfilled]  Visit Vitals  BP (!) 157/90 (BP 1 Location: Left upper arm, BP Patient Position: Lying; At rest)   Pulse 76   Temp 97.9 °F (36.6 °C)   Resp 20   Ht 5' (1.524 m)   Wt 61.2 kg (135 lb)   SpO2 98%   BMI 26.37 kg/m² ASSESSMENT     Wound Identification & Type: skin tear to RLE posterior  Dressing change: Yes, see flow chart  Verbal consent for picture: Yes    Contributing Factors: anticoagulation therapy, decreased mobility, shear force, incontinence of urine and possible dementia    Wound Gluteal fold/cleft (Active)   Number of days: 1       Wound Perineum Anterior;Right (Active)   Number of days: 1       Wound Perineum (Active)   Number of days: 1       Wound Foot Anterior;Right;Plantar;Lateral 12/18/21 (Active)   Number of days: 1       Wound Foot Anterior;Left;Lateral;Plantar 12/18/21 (Active)   Number of days: 1       Wound Pretibial Right open area to the right leg (Active)   Number of days: 1       Wound Calf Right;Posterior Partial Thickness 12/19/21 (Active)   Wound Image   12/19/21 1543   Wound Etiology Skin Tear 12/19/21 1543   Cleansed Cleansed with saline 12/19/21 1543   Dressing/Treatment ABD pad;Honey gel/honey paste; Roll gauze 12/19/21 1543   Dressing Change Due 12/21/21 12/19/21 1543   Wound Length (cm) 5.5 cm 12/19/21 1543   Wound Width (cm) 1 cm 12/19/21 1543   Wound Depth (cm) 0.1 cm 12/19/21 1543   Wound Surface Area (cm^2) 5.5 cm^2 12/19/21 1543   Wound Volume (cm^3) 0.55 cm^3 12/19/21 1543   Wound Assessment Dry;Pink/red 12/19/21 1543   Drainage Amount None 12/19/21 1543   Wound Odor None 12/19/21 1543   Yaneth-Wound/Incision Assessment Dry/flaky;Fragile 12/19/21 1543   Edges Defined edges; Unattached edges 12/19/21 1543   Wound Thickness Description Partial thickness 12/19/21 1543   Number of days: 0          PLAN     Skin Care & Pressure Relief Recommendations  Minimize layers of linen  Turn/reposition approximately every 2 hours  Pillow wedges  Manage incontinence   Promote continence; Skin Protective lotion/cream to buttocks and sacrum daily and as needed with incontinence care  Offload heels pillows    [address] 12  Blood Glucose: 96 on 12/17/21                              Albumin: 3.2 on 12/17/21 WBCs: 6.3 on 12/17/21    Support Surface: Gel mattress    Physician/Provider notified:   Recommendations: Patient has what appears to be a skin tear, not new, to RLE posterior. She is unable to tell me when the injury occurred. Area is dry and healing appears stalled. Apply Therahoney gel every other day, see dressing order. Maintain PureWick to manage  incontinence. Apply zinc paste TID and prn for soiling to buttocks and gluteal folds / cleft to protect skin from incontinence related breakdown. Use foam wedge to turn q2h at 30 degree angle or more to offload sacrum. Float heels with 2-3 pillows while in bed for offloading of the heels. Maintain HOB at 30 degrees or less, if not contraindicated, to reduce pressure to buttocks and sacrum. Raise foot of bed to help prevent friction and shear injury from sliding down in the bed. Will continue to follow.        Discharge Wound Care Needs:  TBD    Teaching completed with:   [] Patient           [] Family member       [] Caregiver          [] Nursing  [] Other    Patient/Caregiver Teaching:  Level of patient/caregiver understanding able to:   [] Indicates understanding       [] Needs reinforcement  [] Unsuccessful      [] Verbal Understanding  [] Demonstrated understanding       [] No evidence of learning  [] Refused teaching         [] N/A       Electronically signed by Doug Davis RN on 12/19/2021 at 3:47 PM

## 2021-12-19 NOTE — CONSULTS
Full consultation dictated. [id number]. Elderly patient with COVID-19 pneumonia. Subtle infiltrates on the chest x-ray only. We will repeat chest x-ray in the morning. She is mildly hypoxic. We will hold off remdesivir. We will check D-dimer in the morning. Continue with other supportive management. Thank you.

## 2021-12-19 NOTE — PROGRESS NOTES
OT eval order received and acknowledged and attempted at 1120 however upon entering room, pt SPO2 observed to be between 75-81% with SPO2 monitor checked for placement. Nursing notified and aware and contacted respiratory therapy. Pt appears in no distress resting in bed. Will continue to follow pt and attempt OT eval at a later time. Thank you.

## 2021-12-19 NOTE — PROGRESS NOTES
Bedside and Verbal shift change report given to Dotty Murdock RN (oncoming nurse) by Barb Grider LPN (offgoing nurse). Report included the following information SBAR, Kardex, Intake/Output, MAR, Accordion, Recent Results and Med Rec Status.

## 2021-12-19 NOTE — PROGRESS NOTES
Hospitalist Progress Note    Subjective:   Daily Progress Note: 12/19/2021 9:21 AM    Hospital Course: Patient is a 79-year-old female with a history of dementia, hypertension, hyperlipidemia from [place] [address] that presented to the emergency room on 12/17/2021 for a 4-day history of cough. She was exposed to Covid. Rapid Covid positive. Chest x-ray shows signs concerning for pneumonia. Patient was placed on IV Decadron and azithromycin. Will consult pulmonology. Subjective: Patient has a continues cough during exam. Says it makes her a little SOB. No chest pain, nausea, or vomiting.      Current Facility-Administered Medications   Medication Dose Route Frequency    benzonatate (TESSALON) capsule 200 mg  200 mg Oral TID    guaiFENesin-codeine (ROBITUSSIN AC) 100-10 mg/5 mL solution 5 mL  5 mL Oral Q6H PRN    sodium chloride (NS) flush 5-40 mL  5-40 mL IntraVENous Q8H    sodium chloride (NS) flush 5-40 mL  5-40 mL IntraVENous PRN    acetaminophen (TYLENOL) tablet 650 mg  650 mg Oral Q6H PRN    Or    acetaminophen (TYLENOL) suppository 650 mg  650 mg Rectal Q6H PRN    ondansetron (ZOFRAN ODT) tablet 4 mg  4 mg Oral Q6H PRN    Or    ondansetron (ZOFRAN) injection 4 mg  4 mg IntraVENous Q6H PRN    enoxaparin (LOVENOX) injection 30 mg  30 mg SubCUTAneous Q24H    cholecalciferol (VITAMIN D3) (1000 Units /25 mcg) tablet 2,000 Units  2,000 Units Oral DAILY    aspirin delayed-release tablet 81 mg  81 mg Oral DAILY    pantoprazole (PROTONIX) tablet 40 mg  40 mg Oral ACB    clopidogreL (PLAVIX) tablet 75 mg  75 mg Oral DAILY    mirtazapine (REMERON) tablet 15 mg  15 mg Oral QHS    melatonin tablet 10 mg  10 mg Oral QHS    levothyroxine (SYNTHROID) tablet 75 mcg  75 mcg Oral ACB    azithromycin (ZITHROMAX) 500 mg in 0.9% sodium chloride 250 mL (VIAL-MATE)  500 mg IntraVENous Q24H    dexamethasone (DECADRON) 4 mg/mL injection 4 mg  4 mg IntraVENous Q8H    zinc sulfate (ZINCATE) 50 mg zinc (220 mg) capsule 1 Capsule  1 Capsule Oral DAILY    ascorbic acid (vitamin C) (VITAMIN C) tablet 250 mg  250 mg Oral BID    hydrALAZINE (APRESOLINE) 20 mg/mL injection 20 mg  20 mg IntraVENous Q6H PRN        Review of Systems  Constitutional: No fevers, No chills, No sweats, +fatigue, No Weakness  Eyes: No redness  Ears, nose, mouth, throat, and face: No nasal congestion, No sore throat, No voice change  Respiratory: + Shortness of Breath, + cough, No wheezing  Cardiovascular: No chest pain, No palpitations, No extremity edema  Gastrointestinal: No nausea, No vomiting, No diarrhea, No abdominal pain  Genitourinary: No frequency, No dysuria, No hematuria  Integument/breast: No skin lesion(s)   Neurological: + Confusion, No headaches, No dizziness      Objective:     Visit Vitals  BP (!) 157/90 (BP 1 Location: Left upper arm, BP Patient Position: Lying; At rest)   Pulse 76   Temp 97.9 °F (36.6 °C)   Resp 20   Ht 5' (1.524 m)   Wt 61.2 kg (135 lb)   SpO2 98%   BMI 26.37 kg/m²    O2 Flow Rate (L/min): 3 l/min O2 Device: None (Room air)    Temp (24hrs), Av.8 °F (36.6 °C), Min:97.7 °F (36.5 °C), Max:97.9 °F (36.6 °C)      No intake/output data recorded. No intake/output data recorded. PHYSICAL EXAM:  Constitutional: No acute distress  Skin: Extremities and face reveal no rashes. HEENT: Sclerae anicteric. Extra-occular muscles are intact  Cardiovascular: Regular rate and rhythm. Respiratory: nonalbored, diminshed  GI: Abdomen nondistended, soft, and nontender. Normal active bowel sounds. Musculoskeletal: No pitting edema of the lower legs. Able to move all ext  Neurological:  Patient is alert and oriented. Cranial nerves II-XII grossly intact  Psychiatric: Mood appears appropriate       Data Review    No results found for this or any previous visit (from the past 24 hour(s)). Radiology review: Chest xray    Assessment:   1. Acute hypoxic respiratory failure secondary to Covid pneumonia  2. Hypertension  3. Hypothyroidism  4. Dementia without behavioral disturbances    Plan:    1. On IV azithromycin, IV Decadron, zinc, vitamin C. Chest x-ray reviewed. Wean oxygen. Supportive therapy. We will add on Tessalon and cough syrup as needed. Consult Pulmonology. Get STAT ABG  2. Blood pressure is elevated. Continue to monitor per unit protocol. Patient had a hypotensive episode. We will add on IV hydralazine as needed. Start norvasc 5mg daily  3. Continue with Synthroid  4. Continue Remeron. 5.  CBC, BMP, LDH, ferritin, CRP in the a.m.  6.  PT OT    Spoke with daughter Marshall Burris at [phone] and updated her on results and further plan. Dispo: 24-48 hours. Barriers include weaning of oxygen and overall improvement of Covid pneumonia. Most likely will go back to [facility]. CODE STATUS Full     DVT prophylaxis: Loveonx  Ulcer prophylaxis: Protonix    Care Plan discussed with: Patient/Family, Nurse and     Total time spent with patient: 33 minutes.

## 2021-12-19 NOTE — PROGRESS NOTES
PHYSICAL THERAPY EVALUATION  Patient: Nafisa Grace (37 y.o. female)  Date: 12/19/2021  Primary Diagnosis: Pneumonia due to COVID-19 virus [U07.1, J12.82]  Hypoxia [R09.02]        Precautions: COVID +ve  ASSESSMENT  Patient is a 80-year-old female with a history of dementia, hypertension, hyperlipidemia from [place] [address] that presented to the emergency room on 12/17/2021 for a 4-day history of cough. She was exposed to Covid. Rapid Covid positive. Chest x-ray shows signs concerning for pneumonia. Patient was placed on IV Decadron and azithromycin. Will consult pulmonology. Patient has a continues cough during exam. Says it makes her a little SOB. Spo2 94% with O2 before and after activities Pt A&O x 4. [facility]  [facility] and  pt was indep for ADLS/IADLS, uses walker AD with mobility prior to admission. .    Based on the objective data described below, the patient presents with generalized weakness, impaired functional mobility, impaired amb, impaired balance, and endurance. Pt semi supine upon PT arrival, agreeable to evaluation. Pt required cg  for bed mobility, cg supine <> sit, min sit <> stand transfers. Pt amb 25 feet with gt belt, HHA,  demonstrating staedy gt pattern with no lobnoted. Pt did good with session today with PT. Pt will benefit from continued skilled PT to address above deficits and return to PLOF. Current PT DC recommendation HHPT due to above deficits. Patient was taken to bathroom, linen changed and new purwick applied. Also set up with breakfast tray. PLAN :  Recommendations and Planned Interventions: bed mobility training, transfer training, gait training, therapeutic exercises, patient and family training/education, and therapeutic activities      Recommend with staff: can amb to bathroom with assist  Frequency/Duration: Patient will be followed by physical therapy:  2-3x/week to address goals.     Recommendation for discharge: (in order for the patient to meet his/her long term goals)  Home with [facility]    This discharge recommendation:  Has been made in collaboration with the attending provider and/or case management    IF patient discharges home will need the following DME: rolling walker         SUBJECTIVE:   Patient stated i am better.     OBJECTIVE DATA SUMMARY:   HISTORY:    Past Medical History:   Diagnosis Date    Dementia ([address]     Hard of hearing     Hypercholesterolemia     Hypertension     Ill-defined condition     memory loss, possible early dementia per daughter    Stroke [facility])     Thyroid disease      Past Surgical History:   Procedure Laterality Date    HX CHOLECYSTECTOMY      HX HYSTERECTOMY      HX KNEE REPLACEMENT Bilateral     PA CARDIAC SURG PROCEDURE UNLIST      stents placed to carotid artery       Home Situation  Home Environment: Assisted living  One/Two Story Residence: One story  Living Alone: No  Support Systems: Child(eden)  Patient Expects to be Discharged to[de-identified] Assisted living  Current DME Used/Available at Home: rosalba Daugherty    EXAMINATION/PRESENTATION/DECISION MAKING:   Critical Behavior:  Neurologic State: Alert  Orientation Level: Oriented to person  Cognition: Follows commands     Hearing: Auditory  Auditory Impairment: Hard of hearing, bilateral,Hearing aid(s)  Range Of Motion:  AROM: Generally decreased, functional                       Strength:    Strength: Generally decreased, functional                    Tone & Sensation:   Tone: Normal                              Functional Mobility:  Bed Mobility:  Rolling: Contact guard assistance  Supine to Sit: Contact guard assistance  Sit to Supine: Contact guard assistance  Scooting: Contact guard assistance  Transfers:  Sit to Stand: Minimum assistance  Stand to Sit: Minimum assistance                       Balance:   Sitting: Intact  Standing: Intact; With support  Ambulation/Gait Training:  Distance (ft): 25 Feet (ft)  Assistive Device: Gait belt  Ambulation - Level of Assistance: Contact guard assistance     Gait Description (WDL): Exceptions to WDL           Base of Support: Narrowed     Speed/Xiomy: Slow  Step Length: Right shortened;Left shortened                Functional Measure:  [facility] [address] [address] AM-PAC 6 Clicks         Basic Mobility Inpatient Short Form  How much difficulty does the patient currently have. .. Unable A Lot A Little None   1. Turning over in bed (including adjusting bedclothes, sheets and blankets)? [] 1   [] 2   [x] 3   [] 4   2. Sitting down on and standing up from a chair with arms ( e.g., wheelchair, bedside commode, etc.)   [] 1   [] 2   [x] 3   [] 4   3. Moving from lying on back to sitting on the side of the bed? [] 1   [] 2   [x] 3   [] 4          How much help from another person does the patient currently need. .. Total A Lot A Little None   4. Moving to and from a bed to a chair (including a wheelchair)? [] 1   [] 2   [x] 3   [] 4   5. Need to walk in hospital room? [] 1   [] 2   [x] 3   [] 4   6. Climbing 3-5 steps with a railing? [] 1   [] 2   [x] 3   [] 4   © 2007, Trustees of [address] [place] [address] [address] [address], under license to [employer]. All rights reserved     Score:  Initial: 18/24 Most Recent: X (Date: 12/19/2021 )   Interpretation of Tool:  Represents activities that are increasingly more difficult (i.e. Bed mobility, Transfers, Gait).   Score 24 23 22-20 19-15 14-10 9-7 6   Modifier CH CI CJ CK CL CM CN         Physical Therapy Evaluation Charge Determination   History Examination Presentation Decision-Making   LOW Complexity : Zero comorbidities / personal factors that will impact the outcome / POC LOW Complexity : 1-2 Standardized tests and measures addressing body structure, function, activity limitation and / or participation in recreation  LOW Complexity : Stable, uncomplicated  Other outcome measures [facility] 6  18/24      Based on the above components, the patient evaluation is determined to be of the following complexity level: LOW     Pain Rating:  3/10 chest due to coughing  Activity Tolerance:   Good    After treatment patient left in no apparent distress:   Supine in bed, Call bell within reach, and Bed / chair alarm activated . GOALS:    Problem: Mobility Impaired (Adult and Pediatric)  Goal: *Acute Goals and Plan of Care (Insert Text)  Description: Patient will move from supine to sit and sit to supine , scoot up and down, and roll side to side in bed with independence within 7 day(s). Patient will transfer from bed to chair and chair to bed with modified independence using the least restrictive device within 7 day(s). Patient will improve static standing balance to modified independence within 1 week(s). Patient will ambulate 100 feet with modified independence with least restrictive device within 1 weeks. Outcome: Progressing Towards Goal       COMMUNICATION/EDUCATION:   The patients plan of care was discussed with: Certified nursing assistant/patient care technician. Fall prevention education was provided and the patient/caregiver indicated understanding., Patient/family have participated as able in goal setting and plan of care. , and Patient/family agree to work toward stated goals and plan of care.          Thank you for this referral.  Jarod Roth, PT Time Calculation: 35 mins

## 2021-12-20 PROBLEM — J96.01 ACUTE RESPIRATORY FAILURE WITH HYPOXIA (HCC): Status: ACTIVE | Noted: 2021-01-01

## 2021-12-20 PROBLEM — F03.90 DEMENTIA WITHOUT BEHAVIORAL DISTURBANCE (HCC): Status: ACTIVE | Noted: 2021-01-01

## 2021-12-20 NOTE — PROGRESS NOTES
Respiratory was asked to come assess the patient's O2 sats due to the reading on the pulse ox was at 72% on O2 4L NC. Respiratory assessed the patient with a pulse ox of between 29-98% on RA. Patient is alert to self, no c/o pain and no c/o SOB and coloring is pink with fingertips warm. OSMIN Jacobson notified and orders for ABG's were placed. Results of ABG's were called to PA and Dr. Justus Menjivar.    Patient remains on O2 2L NC at this time. No distress noted.

## 2021-12-20 NOTE — PROGRESS NOTES
Problem: Self Care Deficits Care Plan (Adult)  Goal: *Acute Goals and Plan of Care (Insert Text)  Description: Pt will be supervision sup<->sit in prep for EOB ADL's  Pt will be supervision LB dressing EOB level  Pt will be supervision sit<-> prep for toilet transfer  Pt will be supervision toilet transfer with LRAD  Pt will be supervision toileting/cloth mgmt LRAD  Pt will be supervision grooming standing sink  Pt will be supervision bathing sitting/standing sink LRAD  Pt will be MI vaughn UE HEP in prep for self care tasks    Outcome: Not Met     OCCUPATIONAL THERAPY EVALUATION  Patient: Blessing Granado (22 y.o. female)  Date: 12/20/2021  Primary Diagnosis: Pneumonia due to COVID-19 virus [U07.1, J12.82]  Hypoxia [R09.02]        Precautions: COVID 19 positive       ASSESSMENT  Patient is 79 y/o female came to [facility] from [facility] with 4 day hx of cough, COVID 19 exposure and adm 12/17/2021 for COVID 19 positive, acute hypoxic respiratory failure 2/2 COVID pneumonia. Pt has hx of dementia, HTN, HLD, hypothyroidism, cholecystectomy, vaughn knee replacements, stent placement carotid artery, CVA. Pt received semi supine in bed A&O to self only (pt extremly [place] and coughing thus unable to hear/answer further orientation questions or PLOF questions. Per chart review, pt from [facility] since August of 2021 and uses a rollator/walker for ambulation. Pt currently presents with decrease balance (fair sitting balance for LB dressing activity), decreased activity tolerance (pt constantly coughing in room), generalized weakness (vaughn UE shoulder flex to aprox 70 however functional) and increased need for assist with self care (min A LB dressing EOB, CGA toileting/cloth mgmt, SBA simple grooming simulated) and functional transfers/mobility (CGA sup<->, CGA scooting EOb, CGA sit<->stand and toilet transfer with Rw and gait belt).  Pt completed vaughn UE HEP for 1 set of 10 reps with min assist in prep for self care tasks. Pt would benefit from skilled OT services while at [facility] in order to increase safety and independence with self care and functional transfers/mobility. Recommend discharge back to [facility] with [facility] when medically appropriate. Other factors to consider for discharge: time since onset, severity of deficits, [place]        PLAN :  Recommendations and Planned Interventions: self care training, functional mobility training, therapeutic exercise, balance training, therapeutic activities, endurance activities, patient education, and home safety training    Frequency/Duration: Patient will be followed by occupational therapy 2-3x/week to address goals. Recommendation for discharge: (in order for the patient to meet his/her long term goals)  Back to [facility] with HHOT    This discharge recommendation:  Has been made in collaboration with the attending provider and/or case management    IF patient discharges home will need the following DME: TBD       SUBJECTIVE:   Patient stated I cant hear you.     OBJECTIVE DATA SUMMARY:   HISTORY:   Past Medical History:   Diagnosis Date    Dementia ([address]     Hard of hearing     Hypercholesterolemia     Hypertension     Ill-defined condition     memory loss, possible early dementia per daughter    Stroke [facility])     Thyroid disease      Past Surgical History:   Procedure Laterality Date    HX CHOLECYSTECTOMY      HX HYSTERECTOMY      HX KNEE REPLACEMENT Bilateral     MD CARDIAC SURG PROCEDURE UNLIST      stents placed to carotid artery       Expanded or extensive additional review of patient history:     Home Situation  Home Environment: [address] Name: [facility]  One/Two Story Residence: One story  Living Alone: No  Support Systems: Child(eden)  Patient Expects to be Discharged to[de-identified] Assisted living  Current DME Used/Available at Home: rosalba Nunez    PLOF: Pt MI for ADLS/IADLS, MI with mobility prior to admission (per chart review)    EXAMINATION OF PERFORMANCE DEFICITS:  Cognitive/Behavioral Status:  Neurologic State: Alert  Orientation Level: Oriented to person  Cognition: Follows commands     Hearing: Auditory  Auditory Impairment: Hard of hearing, bilateral    Range of Motion:  AROM: Generally decreased, functional (vaughn shoulder flex limited to aprox 70 however functional)     Strength:  Strength: Generally decreased, functional (vaughn UE grossly 3+/5)     Balance:  Sitting: Impaired; Without support  Sitting - Static: Good (unsupported)  Sitting - Dynamic: Fair (occasional)  Standing: Impaired; Without support  Standing - Static: Constant support;Good  Standing - Dynamic : Constant support; Fair    Functional Mobility and Transfers for ADLs:  Bed Mobility:  Rolling: Contact guard assistance  Supine to Sit: Contact guard assistance  Sit to Supine: Contact guard assistance  Scooting: Contact guard assistance    Transfers:  Sit to Stand: Contact guard assistance  Stand to Sit: Contact guard assistance  Bed to Chair: Contact guard assistance  Bathroom Mobility: Contact guard assistance  Toilet Transfer : Contact guard assistance  Assistive Device : Gait Belt;Walker, rolling    ADL Assessment:     Oral Facial Hygiene/Grooming: Stand-by assistance (seated EOb simulated)    Lower Body Dressing: Minimum assistance (EOB)    Toileting: Contact guard assistance     ADL Intervention and task modifications:     Grooming  Grooming Assistance: Stand-by assistance  Position Performed: Seated edge of bed  Washing Face: Stand-by assistance  Washing Hands: Stand-by assistance    Lower Body Dressing Assistance  Socks: Minimum assistance  Leg Crossed Method Used: Yes  Position Performed: Seated edge of bed    Toileting  Toileting Assistance: Contact guard assistance  Bladder Hygiene: Stand-by assistance  Bowel Hygiene: Stand-by assistance  Clothing Management: Contact guard assistance    [facility] [address] [address] AM-PAC \"6 Clicks\" Daily Activity Inpatient Short Form  How much help from another person does the patient currently need. .. Total; A Lot A Little None   1. Putting on and taking off regular lower body clothing? []  1 []  2 [x]  3 []  4   2. Bathing (including washing, rinsing, drying)? []  1 []  2 [x]  3 []  4   3. Toileting, which includes using toilet, bedpan or urinal? [] 1 []  2 [x]  3 []  4   4. Putting on and taking off regular upper body clothing? []  1 []  2 []  3 [x]  4   5. Taking care of personal grooming such as brushing teeth? []  1 []  2 [x]  3 []  4   6. Eating meals? []  1 []  2 []  3 [x]  4   © 2007, Trustees of [address] [place] [address] [address] [address], under license to [employer]. All rights reserved     Score: 20/24     Interpretation of Tool:  Represents clinically-significant functional categories (i.e. Activities of daily living). Percentage of Impairment CH    0%   CI    1-19% CJ    20-39% CK    40-59% CL    60-79% CM    80-99% CN     100%   [facility]  Score 6-24 24 23 20-22 15-19 10-14 7-9 6        Occupational Therapy Evaluation Charge Determination   History Examination Decision-Making   LOW Complexity : Brief history review  MEDIUM Complexity : 3-5 performance deficits relating to physical, cognitive , or psychosocial skils that result in activity limitations and / or participation restrictions MEDIUM Complexity : Patient may present with comorbidities that affect occupational performnce. Miniml to moderate modification of tasks or assistance (eg, physical or verbal ) with assesment(s) is necessary to enable patient to complete evaluation       Based on the above components, the patient evaluation is determined to be of the following complexity level: LOW   Pain Rating:  No pain reported    Activity Tolerance:   Good  Please refer to the flowsheet for vital signs taken during this treatment.     After treatment patient left in no apparent distress:    Supine in bed, Call bell within reach, Bed / chair alarm activated, and Side rails x 3    COMMUNICATION/EDUCATION:   The patients plan of care was discussed with: Physical therapist and Registered nurse. Home safety education was provided and the patient/caregiver indicated understanding. and Patient/family have participated as able in goal setting and plan of care. This patients plan of care is appropriate for delegation to [facility].     Thank you for this referral.  Mitch Ervin  Time Calculation: 27 mins

## 2021-12-20 NOTE — PROGRESS NOTES
Bedside and Verbal shift change report given to Rc Sanchez RN (oncoming nurse) by Deshaun Archer LPN (offgoing nurse). Report included the following information SBAR, Kardex, Intake/Output, MAR, Accordion, Recent Results and Med Rec Status.

## 2021-12-20 NOTE — PROGRESS NOTES
Pulmonology and Critical Care Progress Note    Subjective:     Chief Complaint:   Chief Complaint   Patient presents with    Cough          Patient seen and examined  Overnight as noted    Lying in bed comfortably  Sleepy arousable  On 2 L nasal cannula oxygen  Gradual improvement in respiratory status  No acute distress    ABG 12/19 showed pH of 7.44, PCO2 29, PO2 61 on room air    Chest x-ray reviewed and shows worsening bilateral infiltrates      Review of Systems:  Review of systems not obtained due to patient factors.     Current Facility-Administered Medications   Medication Dose Route Frequency Provider Last Rate Last Admin    benzonatate (TESSALON) capsule 200 mg  200 mg Oral TID Ana Laura Galindo PA-C   200 mg at 12/20/21 1109    guaiFENesin-codeine (ROBITUSSIN AC) 100-10 mg/5 mL solution 5 mL  5 mL Oral Q6H PRN Ana Laura Galindo PA-C   5 mL at 12/20/21 1109    amLODIPine (NORVASC) tablet 5 mg  5 mg Oral DAILY Ana Laura Galindo PA-C   5 mg at 12/20/21 1109    zinc oxide-white petrolatum 17-57 % topical paste   Topical TID Edinson Nelson PA-C   Given at 12/20/21 1110    sodium chloride (NS) flush 5-40 mL  5-40 mL IntraVENous Q8H Tanya Mcneal MD   10 mL at 12/20/21 0527    sodium chloride (NS) flush 5-40 mL  5-40 mL IntraVENous PRN Tanya Mcneal MD        acetaminophen (TYLENOL) tablet 650 mg  650 mg Oral Q6H PRN Tanya Mcneal MD        Or   Vince Bark acetaminophen (TYLENOL) suppository 650 mg  650 mg Rectal Q6H PRN Tanya Mcneal MD        ondansetron (ZOFRAN ODT) tablet 4 mg  4 mg Oral Q6H PRN Tanya Mcneal MD        Or    ondansetron (ZOFRAN) injection 4 mg  4 mg IntraVENous Q6H PRN Tanya Mcneal MD        enoxaparin (LOVENOX) injection 30 mg  30 mg SubCUTAneous Q24H Tanya Mcneal MD   30 mg at 12/20/21 0526    cholecalciferol (VITAMIN D3) (1000 Units /25 mcg) tablet 2,000 Units  2,000 Units Oral DAILY Tanya Mcneal MD   2,000 Units at 21 1109    aspirin delayed-release tablet 81 mg  81 mg Oral DAILY Kenn Thompson MD   81 mg at 21 1109    pantoprazole (PROTONIX) tablet 40 mg  40 mg Oral ACB Kenn Thompson MD   40 mg at 21 1110    clopidogreL (PLAVIX) tablet 75 mg  75 mg Oral DAILY Kenn Thompson MD   75 mg at 21 1110    mirtazapine (REMERON) tablet 15 mg  15 mg Oral QHS Kenn Thompson MD   15 mg at 21 2212    melatonin tablet 10 mg  10 mg Oral QHS Kenn Thompson MD   10 mg at 21 2213    levothyroxine (SYNTHROID) tablet 75 mcg  75 mcg Oral ACB Kenn Thompson MD   75 mcg at 21 1110    azithromycin (ZITHROMAX) 500 mg in 0.9% sodium chloride 250 mL (VIAL-MATE)  500 mg IntraVENous Q24H Ana Laura Galindo PA-C 250 mL/hr at 21 2000 500 mg at 21    dexamethasone (DECADRON) 4 mg/mL injection 4 mg  4 mg IntraVENous Q8H Ana Laura Galindo PA-C   4 mg at 21 0526    zinc sulfate (ZINCATE) 50 mg zinc (220 mg) capsule 1 Capsule  1 Capsule Oral DAILY Ana Larua Galindo PA-C   1 Capsule at 21 1110    ascorbic acid (vitamin C) (VITAMIN C) tablet 250 mg  250 mg Oral BID Ana Laura Galindo PA-C   250 mg at 21 1109    hydrALAZINE (APRESOLINE) 20 mg/mL injection 20 mg  20 mg IntraVENous Q6H PRN An aLaura Galindo PA-C                No Known Allergies        Objective:     Blood pressure 123/69, pulse 68, temperature 97.5 °F (36.4 °C), resp. rate 18, height 5' (1.524 m), weight 61.2 kg (135 lb), SpO2 94 %. Temp (24hrs), Av.9 °F (36.6 °C), Min:97.5 °F (36.4 °C), Max:98.2 °F (36.8 °C)      Intake and Output:  Current Shift: No intake/output data recorded. Last 3 Shifts:  1901 -  0700  In: -   Out: 150 [Urine:150]    Physical Exam:    General: Lying in bed comfortably, no acute distress  Eye: Reactive, symmetric  Throat and Neck: Supple  Lung: Reduced air entry bilaterally with prolonged exhalation but no wheezing.   Occasional crackles. Heart: S1+S2. No murmurs  Abdomen: soft, non-tender. Bowel sounds normal. No masses; obese  Extremities: No edema  : Not done  Skin: No cyanosis  Neurologic: A & O x3. Grossly nonfocal  Psychiatric: Appropriate affect; coherent    Lab/Data Review:  Recent Results (from the past 24 hour(s))   METABOLIC PANEL, BASIC    Collection Time: 12/20/21  8:51 AM   Result Value Ref Range    Sodium 141 136 - 145 mmol/L    Potassium 4.4 3.5 - 5.1 mmol/L    Chloride 111 (H) 97 - 108 mmol/L    CO2 24 21 - 32 mmol/L    Anion gap 6 5 - 15 mmol/L    Glucose 139 (H) 65 - 100 mg/dL    BUN 36 (H) 6 - 20 mg/dL    Creatinine 0.99 0.55 - 1.02 mg/dL    BUN/Creatinine ratio 36 (H) 12 - 20      GFR est AA >60 >60 ml/min/1.73m2    GFR est non-AA 53 (L) >60 ml/min/1.73m2    Calcium 8.6 8.5 - 10.1 mg/dL   CBC WITH AUTOMATED DIFF    Collection Time: 12/20/21  8:51 AM   Result Value Ref Range    WBC 8.0 3.6 - 11.0 K/uL    RBC 3.49 (L) 3.80 - 5.20 M/uL    HGB 11.6 11.5 - 16.0 g/dL    HCT 35.3 35.0 - 47.0 %    .1 (H) 80.0 - 99.0 FL    MCH 33.2 26.0 - 34.0 PG    MCHC 32.9 30.0 - 36.5 g/dL    RDW 14.4 11.5 - 14.5 %    PLATELET 616 (L) 218 - 400 K/uL    MPV 11.2 8.9 - 12.9 FL    NRBC 0.0 0.0  WBC    ABSOLUTE NRBC 0.00 0.00 - 0.01 K/uL    NEUTROPHILS 89 (H) 32 - 75 %    LYMPHOCYTES 7 (L) 12 - 49 %    MONOCYTES 3 (L) 5 - 13 %    EOSINOPHILS 0 0 - 7 %    BASOPHILS 0 0 - 1 %    IMMATURE GRANULOCYTES 1 (H) 0 - 0.5 %    ABS. NEUTROPHILS 7.1 1.8 - 8.0 K/UL    ABS. LYMPHOCYTES 0.6 (L) 0.8 - 3.5 K/UL    ABS. MONOCYTES 0.2 0.0 - 1.0 K/UL    ABS. EOSINOPHILS 0.0 0.0 - 0.4 K/UL    ABS. BASOPHILS 0.0 0.0 - 0.1 K/UL    ABS. IMM.  GRANS. 0.0 0.00 - 0.04 K/UL    DF AUTOMATED     C REACTIVE PROTEIN, QT    Collection Time: 12/20/21  8:51 AM   Result Value Ref Range    C-Reactive protein 3.51 (H) 0.00 - 0.60 mg/dL   LD    Collection Time: 12/20/21  8:51 AM   Result Value Ref Range     (H) 81 - 246 U/L   D DIMER    Collection Time: 12/20/21  8:51 AM   Result Value Ref Range    D DIMER 0.33 <0.50 ug/ml(FEU)     chest X-ray      XR CHEST PORT   Final Result   Worsening bilateral airspace disease compatible with multi lobar pneumonia   and/or edema in the appropriate clinical setting. XR CHEST PORT   Final Result   Subtle bilateral pulmonary parenchymal opacities. Please see above. CT Results  (Last 48 hours)    None          Assessment:     1. Acute respiratory failure with hypoxia  2. COVID-19 pneumonia  3. History of dementia  4. Hypertension  5. Hypothyroidism  6. Dyslipidemia    Plan:     Patient admitted to the hospital  Will be watched here closely    Currently in 2 L nasal cannula oxygen  We will use supplemental oxygen as needed to keep saturation above 92%    Chest x-ray reviewed  Patient is COVID-19 positive status  Continue dexamethasone  Back on oxygen with worsening bilateral infiltrates on chest x-ray  We will start remdesivir  Continue vitamin C and zinc  Tessalon Perles  IV azithromycin    Continue blood pressure medications  Continue Synthroid  Continue Remeron    DVT and GI prophylaxis    Questions of patient were answered at bedside in detail  Case discussed in detail with RN, RT, and care team    Thank you for involving me in the care of this patient  I will follow with you closely during hospitalization    Time spent more than 30 minutes in direct patient care with no overlap reviewing results and records, decision-making, and answering questions.       Brenda Haji MD  [facility]  12/20/2021  1:04 PM

## 2021-12-20 NOTE — PROGRESS NOTES
DC Plan: [facility]    Carlos called [facility] [phone] and spoke with Simeon Roberts in administration. Carlos questioned Simeon Roberts if pt can return to [facility] due to her COVID status. Simeon Roberts confirmed pt can return to [facility]. Simeon Roberts indicated pt is in a private room. Carlos will continue to follow.

## 2021-12-20 NOTE — PROGRESS NOTES
Hospitalist Progress Note    Subjective:   Daily Progress Note: 12/20/2021 10:02 AM    Patient is very hard of hearing. Patient daughter Mora Lomeli, informed this provider that she may need new batteries. CXR shows worsening airspace disease. LD and CRP increasing with normal white count.     Current Facility-Administered Medications   Medication Dose Route Frequency    remdesivir 200 mg in 0.9% sodium chloride 250 mL IVPB  200 mg IntraVENous ONCE    [facility]] remdesivir 100 mg in 0.9% sodium chloride 250 mL IVPB  100 mg IntraVENous Q24H    benzonatate (TESSALON) capsule 200 mg  200 mg Oral TID    guaiFENesin-codeine (ROBITUSSIN AC) 100-10 mg/5 mL solution 5 mL  5 mL Oral Q6H PRN    amLODIPine (NORVASC) tablet 5 mg  5 mg Oral DAILY    zinc oxide-white petrolatum 17-57 % topical paste   Topical TID    sodium chloride (NS) flush 5-40 mL  5-40 mL IntraVENous Q8H    sodium chloride (NS) flush 5-40 mL  5-40 mL IntraVENous PRN    acetaminophen (TYLENOL) tablet 650 mg  650 mg Oral Q6H PRN    Or    acetaminophen (TYLENOL) suppository 650 mg  650 mg Rectal Q6H PRN    ondansetron (ZOFRAN ODT) tablet 4 mg  4 mg Oral Q6H PRN    Or    ondansetron (ZOFRAN) injection 4 mg  4 mg IntraVENous Q6H PRN    enoxaparin (LOVENOX) injection 30 mg  30 mg SubCUTAneous Q24H    cholecalciferol (VITAMIN D3) (1000 Units /25 mcg) tablet 2,000 Units  2,000 Units Oral DAILY    aspirin delayed-release tablet 81 mg  81 mg Oral DAILY    pantoprazole (PROTONIX) tablet 40 mg  40 mg Oral ACB    clopidogreL (PLAVIX) tablet 75 mg  75 mg Oral DAILY    mirtazapine (REMERON) tablet 15 mg  15 mg Oral QHS    melatonin tablet 10 mg  10 mg Oral QHS    levothyroxine (SYNTHROID) tablet 75 mcg  75 mcg Oral ACB    azithromycin (ZITHROMAX) 500 mg in 0.9% sodium chloride 250 mL (VIAL-MATE)  500 mg IntraVENous Q24H    dexamethasone (DECADRON) 4 mg/mL injection 4 mg  4 mg IntraVENous Q8H    zinc sulfate (ZINCATE) 50 mg zinc (220 mg) capsule 1 Capsule  1 Capsule Oral DAILY    ascorbic acid (vitamin C) (VITAMIN C) tablet 250 mg  250 mg Oral BID    hydrALAZINE (APRESOLINE) 20 mg/mL injection 20 mg  20 mg IntraVENous Q6H PRN        Review of Systems  Review of Systems   Unable to perform ROS: Other   Constitutional: Negative. Patient hard of hearing but wrote out symptoms and she said no   Respiratory: Negative. Cardiovascular: Negative. Gastrointestinal: Negative. All other systems reviewed and are negative. Objective:     Visit Vitals  /69   Pulse 86   Temp 98 °F (36.7 °C)   Resp 16   Ht 5' (1.524 m)   Wt 61.2 kg (135 lb)   SpO2 98%   BMI 26.37 kg/m²    O2 Flow Rate (L/min): 2 l/min O2 Device: None (Room air)    Temp (24hrs), Av °F (36.7 °C), Min:97.5 °F (36.4 °C), Max:98.2 °F (36.8 °C)      No intake/output data recorded.    1901 -  0700  In: -   Out: 150 [Urine:150]    Recent Results (from the past 24 hour(s))   METABOLIC PANEL, BASIC    Collection Time: 21  8:51 AM   Result Value Ref Range    Sodium 141 136 - 145 mmol/L    Potassium 4.4 3.5 - 5.1 mmol/L    Chloride 111 (H) 97 - 108 mmol/L    CO2 24 21 - 32 mmol/L    Anion gap 6 5 - 15 mmol/L    Glucose 139 (H) 65 - 100 mg/dL    BUN 36 (H) 6 - 20 mg/dL    Creatinine 0.99 0.55 - 1.02 mg/dL    BUN/Creatinine ratio 36 (H) 12 - 20      GFR est AA >60 >60 ml/min/1.73m2    GFR est non-AA 53 (L) >60 ml/min/1.73m2    Calcium 8.6 8.5 - 10.1 mg/dL   CBC WITH AUTOMATED DIFF    Collection Time: 21  8:51 AM   Result Value Ref Range    WBC 8.0 3.6 - 11.0 K/uL    RBC 3.49 (L) 3.80 - 5.20 M/uL    HGB 11.6 11.5 - 16.0 g/dL    HCT 35.3 35.0 - 47.0 %    .1 (H) 80.0 - 99.0 FL    MCH 33.2 26.0 - 34.0 PG    MCHC 32.9 30.0 - 36.5 g/dL    RDW 14.4 11.5 - 14.5 %    PLATELET 999 (L) 856 - 400 K/uL    MPV 11.2 8.9 - 12.9 FL    NRBC 0.0 0.0  WBC    ABSOLUTE NRBC 0.00 0.00 - 0.01 K/uL    NEUTROPHILS 89 (H) 32 - 75 %    LYMPHOCYTES 7 (L) 12 - 49 % MONOCYTES 3 (L) 5 - 13 %    EOSINOPHILS 0 0 - 7 %    BASOPHILS 0 0 - 1 %    IMMATURE GRANULOCYTES 1 (H) 0 - 0.5 %    ABS. NEUTROPHILS 7.1 1.8 - 8.0 K/UL    ABS. LYMPHOCYTES 0.6 (L) 0.8 - 3.5 K/UL    ABS. MONOCYTES 0.2 0.0 - 1.0 K/UL    ABS. EOSINOPHILS 0.0 0.0 - 0.4 K/UL    ABS. BASOPHILS 0.0 0.0 - 0.1 K/UL    ABS. IMM. GRANS. 0.0 0.00 - 0.04 K/UL    DF AUTOMATED     C REACTIVE PROTEIN, QT    Collection Time: 12/20/21  8:51 AM   Result Value Ref Range    C-Reactive protein 3.51 (H) 0.00 - 0.60 mg/dL   LD    Collection Time: 12/20/21  8:51 AM   Result Value Ref Range     (H) 81 - 246 U/L   D DIMER    Collection Time: 12/20/21  8:51 AM   Result Value Ref Range    D DIMER 0.33 <0.50 ug/ml(FEU)        XR CHEST PORT   Final Result   Worsening bilateral airspace disease compatible with multi lobar pneumonia   and/or edema in the appropriate clinical setting. XR CHEST PORT   Final Result   Subtle bilateral pulmonary parenchymal opacities. Please see above. PHYSICAL EXAM:    Physical Exam  Vitals and nursing note reviewed. HENT:      Head: Normocephalic and atraumatic. Ears:      Comments: [place] hearing aides present bilaterally  Cardiovascular:      Rate and Rhythm: Normal rate and regular rhythm. Pulmonary:      Effort: No respiratory distress. Breath sounds: No wheezing. Comments: Diminished breath sounds throughout  Musculoskeletal:      Right lower leg: No edema. Left lower leg: No edema. Skin:     General: Skin is warm. Capillary Refill: Capillary refill takes less than 2 seconds. Neurological:      Mental Status: She is alert.         Data Review    Recent Results (from the past 24 hour(s))   METABOLIC PANEL, BASIC    Collection Time: 12/20/21  8:51 AM   Result Value Ref Range    Sodium 141 136 - 145 mmol/L    Potassium 4.4 3.5 - 5.1 mmol/L    Chloride 111 (H) 97 - 108 mmol/L    CO2 24 21 - 32 mmol/L    Anion gap 6 5 - 15 mmol/L    Glucose 139 (H) 65 - 100 mg/dL BUN 36 (H) 6 - 20 mg/dL    Creatinine 0.99 0.55 - 1.02 mg/dL    BUN/Creatinine ratio 36 (H) 12 - 20      GFR est AA >60 >60 ml/min/1.73m2    GFR est non-AA 53 (L) >60 ml/min/1.73m2    Calcium 8.6 8.5 - 10.1 mg/dL   CBC WITH AUTOMATED DIFF    Collection Time: 12/20/21  8:51 AM   Result Value Ref Range    WBC 8.0 3.6 - 11.0 K/uL    RBC 3.49 (L) 3.80 - 5.20 M/uL    HGB 11.6 11.5 - 16.0 g/dL    HCT 35.3 35.0 - 47.0 %    .1 (H) 80.0 - 99.0 FL    MCH 33.2 26.0 - 34.0 PG    MCHC 32.9 30.0 - 36.5 g/dL    RDW 14.4 11.5 - 14.5 %    PLATELET 844 (L) 631 - 400 K/uL    MPV 11.2 8.9 - 12.9 FL    NRBC 0.0 0.0  WBC    ABSOLUTE NRBC 0.00 0.00 - 0.01 K/uL    NEUTROPHILS 89 (H) 32 - 75 %    LYMPHOCYTES 7 (L) 12 - 49 %    MONOCYTES 3 (L) 5 - 13 %    EOSINOPHILS 0 0 - 7 %    BASOPHILS 0 0 - 1 %    IMMATURE GRANULOCYTES 1 (H) 0 - 0.5 %    ABS. NEUTROPHILS 7.1 1.8 - 8.0 K/UL    ABS. LYMPHOCYTES 0.6 (L) 0.8 - 3.5 K/UL    ABS. MONOCYTES 0.2 0.0 - 1.0 K/UL    ABS. EOSINOPHILS 0.0 0.0 - 0.4 K/UL    ABS. BASOPHILS 0.0 0.0 - 0.1 K/UL    ABS. IMM.  GRANS. 0.0 0.00 - 0.04 K/UL    DF AUTOMATED     C REACTIVE PROTEIN, QT    Collection Time: 12/20/21  8:51 AM   Result Value Ref Range    C-Reactive protein 3.51 (H) 0.00 - 0.60 mg/dL   LD    Collection Time: 12/20/21  8:51 AM   Result Value Ref Range     (H) 81 - 246 U/L   D DIMER    Collection Time: 12/20/21  8:51 AM   Result Value Ref Range    D DIMER 0.33 <0.50 ug/ml(FEU)        Assessment/Plan:     Active Problems:    Essential hypertension (8/22/2017)      Acquired hypothyroidism (8/22/2017)      Hypoxia (12/17/2021)      Pneumonia due to COVID-19 virus (12/17/2021)      Dementia without behavioral disturbance ([facility] [address] (12/20/2021)      Acute respiratory failure with hypoxia ([facility] [address] (12/20/2021)        Hospital Course:    Ana Luisa Oviedo is a 80-year-old female with a history of dementia, hypertension, hypothyroidism, and hyperlipidemia from [place] [address] that presented to the emergency room on 12/17/2021 for a 4-day history of cough. She was exposed to Covid. Rapid Covid positive. Chest x-ray 12/17 shows signs concerning for pneumonia. Patient admitted for further Covid 19 work-up. Significant blood work for hypokalemia and creatinine of 1.53. Patient was placed on IV Decadron and azithromycin. Patient found to have elevated pro-Jose and LD. Pulmonology consulted. Elevated CRP. D-dimer normal. ABG 7.44/29/61 on 12/19 on nasal canula. Chest x-ray from 12/20 showing worsening bilateral airspace disease compatible with multilobar pneumonia and/or edema. Will start on remdesivir. Acute hypoxic respiratory failure secondary to Covid pneumonia  LD and CRP increasing with normal white count  CXR worsening airspace disease  Will start on remdesivir  On IV azithromycin, IV Decadron, zinc, and vitamin C  Wean oxygen as tolerated. Supportive therapy  Tessalon and cough syrup as needed  12/17 blood: NGTD, prelim  Pulmonology following    Hypertension  Continue norvasc  Hydralazine PRN    Hypothyroidism  Continue with Synthroid    Dementia without behavioral disturbances  Continue Remeron    DVT Prophylaxis: lovenox  GI Prophylaxis: protonix  Discharge and disposition barriers: >48 hr, remdesivir, IV decadron and antibiotics    Marshall Louise, daughter [phone]    Care Plan discussed with: Patient/Family, Nurse and     Total time spent with patient: 35 minutes.

## 2021-12-20 NOTE — CONSULTS
[facility]    Name:  Ruy Jenkins  MR#:  [id number]  :  1930  ACCOUNT #:  [de-identified]  DATE OF SERVICE:  2021    ATTENDING PHYSICIAN:  Denzel Koroma. OSMIN Galindo, hospitalist.    REASON FOR CONSULTATION:  COVID-19 pneumonia. HISTORY OF PRESENT ILLNESS:  The patient is a 80-year-old  female. Information is obtained from current medical records. Discussed with the nursing staff. The patient herself is hard of hearing and has an underlying dementia. She is a poor historian. She is a resident of [facility]. She presented to the emergency room on 2021 with complaint of cough and shortness of breath. Apparently a 4-day history prior to presentation. It appears that she was exposed to COVID-19 from someone residing at the [facility]. Her test was positive. Chest x-ray was done which shows only subtle infiltrates. Apparently, she had a blood gas done on room air which showed a PaO2 61. She is now on 2 liters of oxygen. She is saturating in the low 90% range. Intermittently, she coughs which is nonproductive. She is not able to provide me with any further information. PAST MEDICAL HISTORY:  Significant for dementia, hypertension, hyperlipidemia, and hypothyroidism. PAST SURGICAL HISTORY:  Significant for cholecystectomy, hysterectomy, and a knee replacement. There is history of carotid artery stenting. ALLERGIES:  NO KNOWN DRUG ALLERGIES. MEDICATIONS:  Currently, the patient is started on amlodipine 5 mg p.o. daily, vitamin C 250 mg p.o. b.i.d., aspirin 81 mg p.o. daily, azithromycin 500 mg IV daily, benzonatate 200 mg p.o. t.i.d., Plavix 75 mg p.o. daily, dexamethasone 4 mg IV q.8 hourly, Lovenox 30 mg subcutaneously daily, Synthroid 75 mcg p.o. daily, Protonix 40 mg p.o. daily, zinc sulfate p.o. daily, and Remeron 15 mg daily at bedtime. SOCIAL HISTORY:  She is a resident of a nursing home facility. Apparently, no history of drug, alcohol, tobacco abuse. OCCUPATIONAL HISTORY, FAMILY HISTORY, AND REVIEW OF SYSTEMS:  Largely unobtainable from the patient. PHYSICAL EXAMINATION:  GENERAL:  The patient is an elderly frail  female who does not appear to be in any distress. She is on 2 L oxygen. She coughs intermittently. VITAL SIGNS:  Temperature is 97.9, pulse is 76 per minute, respiratory rate is in the low 20s, blood pressure is 157/90. On 2 L oxygen, her saturation is in the low 90% range. HEENT:  Shows pupils are equal and reactive to light. No pallor or icterus. Nasal passages are patent. Oropharynx is without thrush. NECK:  Supple. Trachea central.  No JVD or lymphadenopathy. She is not using accessory muscles of respiration. CHEST:  Symmetrical with equal and fair air entry bilaterally. She does have a few basilar crackles. She begins to cough with deep inspiration. HEART:  Rhythm is regular without any loud murmur or gallop. ABDOMEN:  Soft and benign. EXTREMITIES:  Do not show any cyanosis or clubbing. No pitting edema. Pulses are palpable. Some osteoarthritic changes are noted. SKIN:  Warm and dry. NEUROLOGIC:  Grossly intact. DIAGNOSTIC DATA:  Chest x-ray done on 12/17/2021 shows only subtle bilateral infiltrates. Personally reviewed. Echocardiogram done in 02/2021 showed severe aortic stenosis. LABORATORY DATA:  Arterial blood gases done on room air showed 7.44 with a pCO2 of 29 and a PaO2 of 61. Electrolytes are mostly within normal limits. BUN is 34, creatinine is 1.22, blood glucose of 130. WBC count is 15.6 with neutrophils of 89%, hemoglobin is 13.1, platelet count of 917. INR 1.1. LDH is 353. Ferritin is 168. CRP is 2.01. Influenza A and B was negative. ASSESSMENT AND PLAN:  3  A 70-year-old  female, vaccination status is not known. She is presenting with COVID-19 infection. Chest x-ray shows very subtle infiltrates.   I will repeat the chest x-ray in the morning. She is relatively comfortable on two liters oxygen. Blood gases on room air showed 7.44/29/61. I will hold off remdesivir. Continue with supportive management including azithromycin intravenously and dexamethasone currently ordered 4 mg intravenously every 8 hours. Continue with zinc and ascorbic acid. She is on Lovenox subcutaneously for deep venous thrombosis prophylaxis. I will check a D-dimer in the morning. 2.  History of underlying dementia, hypertension, hypothyroidism, and dyslipidemia. Thank you for allowing me to participate in the care of this patient. I will follow the patient closely with you.     Ramírez Villanueva MD      [id number]  D:  12/19/2021 19:02  T:  12/20/2021 0:29  JOB #:  [id number]

## 2021-12-21 NOTE — PROGRESS NOTES
Pulmonology and Critical Care Progress Note    Subjective:     Chief Complaint:   Chief Complaint   Patient presents with    Cough          Patient seen and examined  Overnight as noted    Lying in bed comfortably   awake and alert  On 2 L nasal cannula oxygen  Gradual improvement in respiratory status  No acute distress    ABG 12/19 showed pH of 7.44, PCO2 29, PO2 61 on room air    Chest x-ray reviewed and shows worsening bilateral infiltrates      Review of Systems:  Review of systems not obtained due to patient factors. Current Facility-Administered Medications   Medication Dose Route Frequency Provider Last Rate Last Admin    VANCOMYCIN INFORMATION NOTE   Other Rx Dosing/Monitoring Shaka Prince MD        vancomycin (VANCOCIN) 1,250 mg in 0.9% sodium chloride 250 mL (VIAL-MATE)  1,250 mg IntraVENous ONCE Shaka Prince MD        [START ON 12/22/2021] vancomycin (VANCOCIN) 750 mg in 0.9% sodium chloride 250 mL (VIAL-MATE)  750 mg IntraVENous Q24H Shaka Prince MD        [START ON 12/23/2021] Vancomycin trough level - please draw prior to dose on 12/23 @ 1000. Thanks!    Other ONCE Shaka Prince MD        remdesivir 100 mg in 0.9% sodium chloride 250 mL IVPB  100 mg IntraVENous Q24H Corona Abraham MD        benzonatate (TESSALON) capsule 200 mg  200 mg Oral TID Ana Laura Galindo PA-C   200 mg at 12/21/21 0905    guaiFENesin-codeine (ROBITUSSIN AC) 100-10 mg/5 mL solution 5 mL  5 mL Oral Q6H PRN Ana Laura Galindo PA-C   5 mL at 12/21/21 0936    amLODIPine (NORVASC) tablet 5 mg  5 mg Oral DAILY Ana Laura Galindo PA-C   5 mg at 12/20/21 1109    zinc oxide-white petrolatum 17-57 % topical paste   Topical TID Amish Sears PA-C   Given at 12/21/21 3571    sodium chloride (NS) flush 5-40 mL  5-40 mL IntraVENous Q8H Cecile Thompson MD   10 mL at 12/21/21 0503    sodium chloride (NS) flush 5-40 mL  5-40 mL IntraVENous PRN Cecile Thompson MD        acetaminophen (TYLENOL) tablet 650 mg  650 mg Oral Q6H PRN Chio Bhardwaj MD        Or   Angelika Phillip acetaminophen (TYLENOL) suppository 650 mg  650 mg Rectal Q6H PRN Chio Bhardwaj MD        ondansetron (ZOFRAN ODT) tablet 4 mg  4 mg Oral Q6H PRN Chio Bhardwaj MD        Or    ondansetron [facility]) injection 4 mg  4 mg IntraVENous Q6H PRN Chio Bhardwaj MD        enoxaparin (LOVENOX) injection 30 mg  30 mg SubCUTAneous Q24H Chio Bhardwaj MD   30 mg at 12/21/21 0502    cholecalciferol (VITAMIN D3) (1000 Units /25 mcg) tablet 2,000 Units  2,000 Units Oral DAILY Chio Bhardwaj MD   2,000 Units at 12/21/21 0905    aspirin delayed-release tablet 81 mg  81 mg Oral DAILY Chio Bhardwaj MD   81 mg at 12/21/21 0905    pantoprazole (PROTONIX) tablet 40 mg  40 mg Oral ACLUCAS Bhardwaj MD   40 mg at 12/21/21 0905    clopidogreL (PLAVIX) tablet 75 mg  75 mg Oral DAILY Chio Bhardwaj MD   75 mg at 12/21/21 0905    mirtazapine (REMERON) tablet 15 mg  15 mg Oral QHS Chio Bhardwaj MD   15 mg at 12/20/21 2214    melatonin tablet 10 mg  10 mg Oral QHS Chio Bhardwaj MD   10 mg at 12/20/21 2213    levothyroxine (SYNTHROID) tablet 75 mcg  75 mcg Oral ACLUCAS Bhardwaj MD   75 mcg at 12/21/21 0905    azithromycin (ZITHROMAX) 500 mg in 0.9% sodium chloride 250 mL (VIAL-MATE)  500 mg IntraVENous Q24H Ana Laura Galindo PA-C 250 mL/hr at 12/20/21 2213 500 mg at 12/20/21 2213    dexamethasone (DECADRON) 4 mg/mL injection 4 mg  4 mg IntraVENous Q8H Ana Laura Galindo PA-C   4 mg at 12/21/21 0502    zinc sulfate (ZINCATE) 50 mg zinc (220 mg) capsule 1 Capsule  1 Capsule Oral DAILY Ana Laura Galindo PA-C   1 Capsule at 12/21/21 0905    ascorbic acid (vitamin C) (VITAMIN C) tablet 250 mg  250 mg Oral BID Ana Laura Galindo PA-C   250 mg at 12/21/21 6134    hydrALAZINE (APRESOLINE) 20 mg/mL injection 20 mg  20 mg IntraVENous Q6H PRN Ana Laura Galindo PA-C                No Known Allergies        Objective:     Blood pressure 128/76, pulse 62, temperature 98.4 °F (36.9 °C), resp. rate 17, height 5' (1.524 m), weight 61.2 kg (135 lb), SpO2 95 %. Temp (24hrs), Av.7 °F (36.5 °C), Min:96.8 °F (36 °C), Max:98.4 °F (36.9 °C)      Intake and Output:  Current Shift: No intake/output data recorded. Last 3 Shifts: No intake/output data recorded. Physical Exam:    General: Lying in bed comfortably, no acute distress  Eye: Reactive, symmetric  Throat and Neck: Supple  Lung: Reduced air entry bilaterally with prolonged exhalation but no wheezing. Occasional crackles. Heart: S1+S2. No murmurs  Abdomen: soft, non-tender. Bowel sounds normal. No masses; obese  Extremities: No edema  : Not done  Skin: No cyanosis  Neurologic: A & O x3. Grossly nonfocal  Psychiatric: Appropriate affect; coherent    Lab/Data Review:  No results found for this or any previous visit (from the past 24 hour(s)). chest X-ray      XR CHEST PORT   Final Result   Worsening bilateral airspace disease compatible with multi lobar pneumonia   and/or edema in the appropriate clinical setting. XR CHEST PORT   Final Result   Subtle bilateral pulmonary parenchymal opacities. Please see above. CT Results  (Last 48 hours)    None          Assessment:     1. Acute respiratory failure with hypoxia  2. COVID-19 pneumonia  3. History of dementia  4. Hypertension  5. Hypothyroidism  6.   Dyslipidemia    Plan:     Patient admitted to the hospital  Will be watched here closely    Currently in 2 L nasal cannula oxygen  We will use supplemental oxygen as needed to keep saturation above 92%    Chest x-ray reviewed  Patient is COVID-19 positive status  Continue dexamethasone  Back on oxygen with worsening bilateral infiltrates on chest x-ray  Remdesivir started 2021  Continue vitamin C and zinc  Tessalon Perles  IV azithromycin    Continue blood pressure medications  Continue Synthroid  Continue Remeron    DVT and GI prophylaxis    Questions of patient were answered at bedside in detail  Case discussed in detail with RN, RT, and care team    Thank you for involving me in the care of this patient  I will follow with you closely during hospitalization    Time spent more than 30 minutes in direct patient care with no overlap reviewing results and records, decision-making, and answering questions.       Arnulfo Martinez MD  [facility]  12/21/2021  1:04 PM

## 2021-12-21 NOTE — ROUTINE PROCESS
Bedside and Verbal shift change report given to Kelvin Mir RN(oncoming nurse) by Edsion Aguirre (offgoing nurse). Report included the following information SBAR, Kardex, MAR, Accordion, Recent Results, Med Rec Status and Quality Measures.

## 2021-12-21 NOTE — PROGRESS NOTES
Patient has pulled out 2 IVs, hard stick, PICC team to try a Midline or another peripheral, Alirio SOLORIO made aware

## 2021-12-21 NOTE — PROGRESS NOTES
Hospitalist Progress Note    Subjective:   Daily Progress Note: 12/21/2021 10:02 AM    Patient is hard of hearing but hearing aides were turned on today so she was able to communicate. LD increased while CRP decreased white blood cell count increased from 8 to 15.2. Patient has been excessive coughing, nonproductive. Current Facility-Administered Medications   Medication Dose Route Frequency    VANCOMYCIN INFORMATION NOTE   Other Rx Dosing/Monitoring    [START ON 12/22/2021] vancomycin (VANCOCIN) 750 mg in 0.9% sodium chloride 250 mL (VIAL-MATE)  750 mg IntraVENous Q24H    [START ON 12/23/2021] Vancomycin trough level - please draw prior to dose on 12/23 @ 1000. Thanks!    Other ONCE    benzocaine-menthoL (CEPACOL) lozenge 1 Lozenge  1 Lozenge Mucous Membrane PRN    cetirizine (ZYRTEC) tablet 5 mg  5 mg Oral DAILY PRN    remdesivir 100 mg in 0.9% sodium chloride 250 mL IVPB  100 mg IntraVENous Q24H    benzonatate (TESSALON) capsule 200 mg  200 mg Oral TID    guaiFENesin-codeine (ROBITUSSIN AC) 100-10 mg/5 mL solution 5 mL  5 mL Oral Q6H PRN    amLODIPine (NORVASC) tablet 5 mg  5 mg Oral DAILY    zinc oxide-white petrolatum 17-57 % topical paste   Topical TID    sodium chloride (NS) flush 5-40 mL  5-40 mL IntraVENous Q8H    sodium chloride (NS) flush 5-40 mL  5-40 mL IntraVENous PRN    acetaminophen (TYLENOL) tablet 650 mg  650 mg Oral Q6H PRN    Or    acetaminophen (TYLENOL) suppository 650 mg  650 mg Rectal Q6H PRN    ondansetron (ZOFRAN ODT) tablet 4 mg  4 mg Oral Q6H PRN    Or    ondansetron (ZOFRAN) injection 4 mg  4 mg IntraVENous Q6H PRN    enoxaparin (LOVENOX) injection 30 mg  30 mg SubCUTAneous Q24H    cholecalciferol (VITAMIN D3) (1000 Units /25 mcg) tablet 2,000 Units  2,000 Units Oral DAILY    aspirin delayed-release tablet 81 mg  81 mg Oral DAILY    pantoprazole (PROTONIX) tablet 40 mg  40 mg Oral ACB    clopidogreL (PLAVIX) tablet 75 mg  75 mg Oral DAILY    mirtazapine (REMERON) tablet 15 mg  15 mg Oral QHS    melatonin tablet 10 mg  10 mg Oral QHS    levothyroxine (SYNTHROID) tablet 75 mcg  75 mcg Oral ACB    azithromycin (ZITHROMAX) 500 mg in 0.9% sodium chloride 250 mL (VIAL-MATE)  500 mg IntraVENous Q24H    dexamethasone (DECADRON) 4 mg/mL injection 4 mg  4 mg IntraVENous Q8H    zinc sulfate (ZINCATE) 50 mg zinc (220 mg) capsule 1 Capsule  1 Capsule Oral DAILY    ascorbic acid (vitamin C) (VITAMIN C) tablet 250 mg  250 mg Oral BID    hydrALAZINE (APRESOLINE) 20 mg/mL injection 20 mg  20 mg IntraVENous Q6H PRN        Review of Systems  Review of Systems   Unable to perform ROS: Other   Constitutional: Negative. Patient hard of hearing but wrote out symptoms and she said no   Respiratory: Positive for cough. Negative for hemoptysis, shortness of breath and wheezing. Cardiovascular: Negative. Gastrointestinal: Negative. All other systems reviewed and are negative. Objective:     Visit Vitals  /76 (BP 1 Location: Right upper arm, BP Patient Position: At rest;Lying)   Pulse 62   Temp 98.4 °F (36.9 °C)   Resp 17   Ht 5' (1.524 m)   Wt 61.2 kg (135 lb)   SpO2 95%   BMI 26.37 kg/m²    O2 Flow Rate (L/min): 2 l/min O2 Device: None (Room air)    Temp (24hrs), Av.6 °F (36.4 °C), Min:96.8 °F (36 °C), Max:98.4 °F (36.9 °C)       0701 -  1900  In: -   Out: 850 [Urine:850]  No intake/output data recorded.     Recent Results (from the past 24 hour(s))   METABOLIC PANEL, COMPREHENSIVE    Collection Time: 21 11:46 AM   Result Value Ref Range    Sodium 144 136 - 145 mmol/L    Potassium 4.8 3.5 - 5.1 mmol/L    Chloride 111 (H) 97 - 108 mmol/L    CO2 24 21 - 32 mmol/L    Anion gap 9 5 - 15 mmol/L    Glucose 148 (H) 65 - 100 mg/dL    BUN 38 (H) 6 - 20 mg/dL    Creatinine 0.96 0.55 - 1.02 mg/dL    BUN/Creatinine ratio 40 (H) 12 - 20      GFR est AA >60 >60 ml/min/1.73m2    GFR est non-AA 54 (L) >60 ml/min/1.73m2    Calcium 8.8 8.5 - 10.1 mg/dL Bilirubin, total 0.7 0.2 - 1.0 mg/dL    AST (SGOT) 64 (H) 15 - 37 U/L    ALT (SGPT) 46 12 - 78 U/L    Alk. phosphatase 77 45 - 117 U/L    Protein, total 6.6 6.4 - 8.2 g/dL    Albumin 2.7 (L) 3.5 - 5.0 g/dL    Globulin 3.9 2.0 - 4.0 g/dL    A-G Ratio 0.7 (L) 1.1 - 2.2     CBC WITH AUTOMATED DIFF    Collection Time: 12/21/21 11:46 AM   Result Value Ref Range    WBC 15.2 (H) 3.6 - 11.0 K/uL    RBC 4.10 3.80 - 5.20 M/uL    HGB 13.7 11.5 - 16.0 g/dL    HCT 41.2 35.0 - 47.0 %    .5 (H) 80.0 - 99.0 FL    MCH 33.4 26.0 - 34.0 PG    MCHC 33.3 30.0 - 36.5 g/dL    RDW 14.3 11.5 - 14.5 %    PLATELET 908 770 - 342 K/uL    MPV 11.4 8.9 - 12.9 FL    NRBC 0.0 0.0  WBC    ABSOLUTE NRBC 0.00 0.00 - 0.01 K/uL    NEUTROPHILS 92 (H) 32 - 75 %    LYMPHOCYTES 3 (L) 12 - 49 %    MONOCYTES 4 (L) 5 - 13 %    EOSINOPHILS 0 0 - 7 %    BASOPHILS 0 0 - 1 %    IMMATURE GRANULOCYTES 1 (H) 0 - 0.5 %    ABS. NEUTROPHILS 14.0 (H) 1.8 - 8.0 K/UL    ABS. LYMPHOCYTES 0.5 (L) 0.8 - 3.5 K/UL    ABS. MONOCYTES 0.6 0.0 - 1.0 K/UL    ABS. EOSINOPHILS 0.0 0.0 - 0.4 K/UL    ABS. BASOPHILS 0.0 0.0 - 0.1 K/UL    ABS. IMM. GRANS. 0.2 (H) 0.00 - 0.04 K/UL    DF AUTOMATED     C REACTIVE PROTEIN, QT    Collection Time: 12/21/21 11:46 AM   Result Value Ref Range    C-Reactive protein 3.04 (H) 0.00 - 0.60 mg/dL   LD    Collection Time: 12/21/21 11:46 AM   Result Value Ref Range     (H) 81 - 246 U/L        XR CHEST PORT   Final Result   Worsening bilateral airspace disease compatible with multi lobar pneumonia   and/or edema in the appropriate clinical setting. XR CHEST PORT   Final Result   Subtle bilateral pulmonary parenchymal opacities. Please see above. PHYSICAL EXAM:    Physical Exam  Vitals and nursing note reviewed. HENT:      Head: Normocephalic and atraumatic. Ears:      Comments: [place] hearing aides present bilaterally  Cardiovascular:      Rate and Rhythm: Normal rate and regular rhythm.    Pulmonary:      Effort: No respiratory distress. Breath sounds: No wheezing. Comments: On nasal canula  Breathing sounds better  Musculoskeletal:      Right lower leg: No edema. Left lower leg: No edema. Skin:     General: Skin is warm. Capillary Refill: Capillary refill takes less than 2 seconds. Neurological:      Mental Status: She is alert. Data Review    Recent Results (from the past 24 hour(s))   METABOLIC PANEL, COMPREHENSIVE    Collection Time: 12/21/21 11:46 AM   Result Value Ref Range    Sodium 144 136 - 145 mmol/L    Potassium 4.8 3.5 - 5.1 mmol/L    Chloride 111 (H) 97 - 108 mmol/L    CO2 24 21 - 32 mmol/L    Anion gap 9 5 - 15 mmol/L    Glucose 148 (H) 65 - 100 mg/dL    BUN 38 (H) 6 - 20 mg/dL    Creatinine 0.96 0.55 - 1.02 mg/dL    BUN/Creatinine ratio 40 (H) 12 - 20      GFR est AA >60 >60 ml/min/1.73m2    GFR est non-AA 54 (L) >60 ml/min/1.73m2    Calcium 8.8 8.5 - 10.1 mg/dL    Bilirubin, total 0.7 0.2 - 1.0 mg/dL    AST (SGOT) 64 (H) 15 - 37 U/L    ALT (SGPT) 46 12 - 78 U/L    Alk. phosphatase 77 45 - 117 U/L    Protein, total 6.6 6.4 - 8.2 g/dL    Albumin 2.7 (L) 3.5 - 5.0 g/dL    Globulin 3.9 2.0 - 4.0 g/dL    A-G Ratio 0.7 (L) 1.1 - 2.2     CBC WITH AUTOMATED DIFF    Collection Time: 12/21/21 11:46 AM   Result Value Ref Range    WBC 15.2 (H) 3.6 - 11.0 K/uL    RBC 4.10 3.80 - 5.20 M/uL    HGB 13.7 11.5 - 16.0 g/dL    HCT 41.2 35.0 - 47.0 %    .5 (H) 80.0 - 99.0 FL    MCH 33.4 26.0 - 34.0 PG    MCHC 33.3 30.0 - 36.5 g/dL    RDW 14.3 11.5 - 14.5 %    PLATELET 705 247 - 807 K/uL    MPV 11.4 8.9 - 12.9 FL    NRBC 0.0 0.0  WBC    ABSOLUTE NRBC 0.00 0.00 - 0.01 K/uL    NEUTROPHILS 92 (H) 32 - 75 %    LYMPHOCYTES 3 (L) 12 - 49 %    MONOCYTES 4 (L) 5 - 13 %    EOSINOPHILS 0 0 - 7 %    BASOPHILS 0 0 - 1 %    IMMATURE GRANULOCYTES 1 (H) 0 - 0.5 %    ABS. NEUTROPHILS 14.0 (H) 1.8 - 8.0 K/UL    ABS. LYMPHOCYTES 0.5 (L) 0.8 - 3.5 K/UL    ABS. MONOCYTES 0.6 0.0 - 1.0 K/UL    ABS.  EOSINOPHILS 0.0 0.0 - 0.4 K/UL    ABS. BASOPHILS 0.0 0.0 - 0.1 K/UL    ABS. IMM. GRANS. 0.2 (H) 0.00 - 0.04 K/UL    DF AUTOMATED     C REACTIVE PROTEIN, QT    Collection Time: 12/21/21 11:46 AM   Result Value Ref Range    C-Reactive protein 3.04 (H) 0.00 - 0.60 mg/dL   LD    Collection Time: 12/21/21 11:46 AM   Result Value Ref Range     (H) 81 - 246 U/L        Assessment/Plan:     Active Problems:    Essential hypertension (8/22/2017)      Acquired hypothyroidism (8/22/2017)      Hypoxia (12/17/2021)      Pneumonia due to COVID-19 virus (12/17/2021)      Dementia without behavioral disturbance ([facility] [address] (12/20/2021)      Acute respiratory failure with hypoxia ([facility] [address] (12/20/2021)        Hospital Course:    Eliot Brewer is a 70-year-old female with a history of dementia, hypertension, hypothyroidism, and hyperlipidemia from [place] [address] that presented to the emergency room on 12/17/2021 for a 4-day history of cough. She was exposed to Covid. Rapid Covid positive. Chest x-ray 12/17 shows signs concerning for pneumonia. Patient admitted for further Covid 19 work-up. Significant blood work for hypokalemia and creatinine of 1.53. Patient was placed on IV Decadron and azithromycin. Patient found to have elevated pro-Jose and LD. Pulmonology consulted. Elevated CRP. D-dimer normal. ABG 7.44/29/61 on 12/19 on nasal canula. Chest x-ray from 12/20 showing worsening bilateral airspace disease compatible with multilobar pneumonia and/or edema. Remdesivir started on 12/20/2021. Acute hypoxic respiratory failure secondary to Covid pneumonia  LD increasing and CRP decreasing with leukocytosis  On remdesivir, IV azithromycin, IV Decadron, zinc, and vitamin C  Wean oxygen as tolerated.  Supportive therapy  Tessalon and cough syrup as needed  Lozenges as needed  12/17 blood: staphylococcus, coagulase negative, likely contaminant  Pulmonology following    Hypertension  Continue norvasc  Hydralazine PRN    Hypothyroidism  Continue with Synthroid    Dementia without behavioral disturbances  Continue Remeron    DVT Prophylaxis: lovenox  GI Prophylaxis: protonix  Discharge and disposition barriers: >48 hr, remdesivir, IV decadron and antibiotics    Nancy Roy, daughter [phone]    Care Plan discussed with: Patient/Family, Nurse and     Total time spent with patient: 35 minutes.

## 2021-12-21 NOTE — PROGRESS NOTES
Comprehensive Nutrition Assessment    Type and Reason for Visit: Consult,Initial (poor po x 5 days)    Nutrition Recommendations/Plan:   Diet as ordered/tolerated  Add Ensure Enlive TID  Monitor intake, weights, labs and skin changes    Document intake %, BM's in I/O's    Nutrition Assessment:  Admit from [facility] d/t persistent dry cough x 4 days, worsening and weakness. SOB requiring supplemental 02. +COVID. 12/21 Chest x-ray  shows worsening bilateral infiltrates. She is on soft diet with reported intakes/appetite poor x 5d. Labs:12/21 , K 4.8, BUN 38, Cre .96, Gluc 148, CRP 3.04, Alb 2.7Meds: vanco, remdesivir,  Vit D3, remeron, levothyroxine, Zinc sulfate, Vit C, Protonix. Malnutrition Assessment:  Malnutrition Status:  Mild malnutrition    Context:  Acute illness     Findings of the 6 clinical characteristics of malnutrition:   Energy Intake:  1 - 75% or less of est energy req for 7 or more days  Weight Loss:  No significant weight loss     Body Fat Loss:  Unable to assess,     Muscle Mass Loss:  Unable to assess,    Fluid Accumulation:  No significant fluid accumulation,            Estimated Daily Nutrient Needs:  Energy (kcal): 1534 sabrina (25kcal/kg); Weight Used for Energy Requirements: Current  Protein (g): 74 (1.2kg); Weight Used for Protein Requirements: Current  Fluid (ml/day): 1500ml; Method Used for Fluid Requirements: 1 ml/kcal      Nutrition Related Findings:  NFPE not performed d/t COVID. No dysphagia noted. No n/v, no c/d. No edema. No BM documented. Wounds:    Skin tears (Skin tear RLE posterior (not new). )         Current Nutrition Therapies:  ADULT DIET Easy to Chew    Anthropometric Measures:  · Height:  5' (152.4 cm)  · Current Body Wt:  61.2 kg (134 lb 14.7 oz)   · Admission Body Wt:  134 lb 14.7 oz    · Ideal Body Wt:  100 lbs:  134.9 %   · Adjusted Body Weight:   ; Weight Adjustment for: No adjustment   · BMI Category: Overweight (BMI 25.0-29. 9)         Nutrition Diagnosis:   · Inadequate protein-energy intake related to impaired respiratory function,inadequate protein-energy intake as evidenced by intake 0-25%      Nutrition Interventions:   Food and/or Nutrient Delivery: Continue current diet,Start oral nutrition supplement  Nutrition Education and Counseling: No recommendations at this time  Coordination of Nutrition Care: Continue to monitor while inpatient    Goals:  Pt will consume >50% meals and supplement. maintain weight +/-3%       Nutrition Monitoring and Evaluation:   Behavioral-Environmental Outcomes: None identified  Food/Nutrient Intake Outcomes: Food and nutrient intake,Supplement intake  Physical Signs/Symptoms Outcomes: Weight,Skin,Biochemical data    Discharge Planning:     Too soon to determine     Electronically signed by Gerardo Quinn RD on 12/21/2021 at 3:45 PM    Contact: [phone]

## 2021-12-21 NOTE — PROGRESS NOTES
Paige Brand MD made aware of critical blood culture results, orders received for pharmacy consult for vancomycin dosing.

## 2021-12-21 NOTE — PROGRESS NOTES
Day #1 of Vancomycin    Indication for Antimicrobials: bacteremia     Consult placed by: Dr. Ernestine Hussein    Significant Cultures:    blood: GPC in clusters 1/4 - prelim    Labs:  Recent Labs     21  0851 21  1300   CREA 0.99 1.22*   BUN 36* 34*   WBC 8.0 15.6*     Temp (24hrs), Av.7 °F (36.5 °C), Min:96.8 °F (36 °C), Max:98.4 °F (36.9 °C)      Is the Patient on Dialysis? No    Creatinine Clearance (mL/min):   CrCl (Actual Body Weight): 35.8  CrCl (Adjusted Body Weight): 30.3  CrCl (Ideal Body Weight): 26.6    Goal level: AUC/BRENDAN 400-600     Impression/Plan:   RUY seems to be resolved  WBC trending down and now within normal limits  Will order loading dose: vancomycin 1250 mg IV x 1  Maintenance regimen: 750 mg IV q24h   --projected steady state AUC ~422 with trough 13.4  Will get trough level on   Antimicrobial stop date      Pharmacy will follow daily and adjust medications as appropriate for renal function and/or serum levels.     Thank you,  [facility]

## 2021-12-22 NOTE — PROGRESS NOTES
Hospitalist Progress Note    Subjective:   Daily Progress Note: 12/22/2021 10:02 AM    Patient was coughing less today but was having decreased oxygen saturation. Consulted respiratory therapy for increasing oxygen demand and SPO2 of 87 this morning on 5 L of nasal cannula. Patient originally was having taking off her nasal cannula. Later in the day it was secured. But was still not saturating well enough reconsulted respiratory therapy who placed her on high flow which she is now adequately saturating at. Patient placed on one-to-one. Current Facility-Administered Medications   Medication Dose Route Frequency    dexAMETHasone (DECADRON) solution 10 mg  10 mg Oral ONCE    VANCOMYCIN INFORMATION NOTE   Other Rx Dosing/Monitoring    vancomycin (VANCOCIN) 750 mg in 0.9% sodium chloride 250 mL (VIAL-MATE)  750 mg IntraVENous Q24H    [START ON 12/23/2021] Vancomycin trough level - please draw prior to dose on 12/23 @ 1000. Thanks!    Other ONCE    benzocaine-menthoL (CEPACOL) lozenge 1 Lozenge  1 Lozenge Mucous Membrane PRN    cetirizine (ZYRTEC) tablet 5 mg  5 mg Oral DAILY PRN    remdesivir 100 mg in 0.9% sodium chloride 250 mL IVPB  100 mg IntraVENous Q24H    benzonatate (TESSALON) capsule 200 mg  200 mg Oral TID    guaiFENesin-codeine (ROBITUSSIN AC) 100-10 mg/5 mL solution 5 mL  5 mL Oral Q6H PRN    amLODIPine (NORVASC) tablet 5 mg  5 mg Oral DAILY    zinc oxide-white petrolatum 17-57 % topical paste   Topical TID    sodium chloride (NS) flush 5-40 mL  5-40 mL IntraVENous Q8H    sodium chloride (NS) flush 5-40 mL  5-40 mL IntraVENous PRN    acetaminophen (TYLENOL) tablet 650 mg  650 mg Oral Q6H PRN    Or    acetaminophen (TYLENOL) suppository 650 mg  650 mg Rectal Q6H PRN    ondansetron (ZOFRAN ODT) tablet 4 mg  4 mg Oral Q6H PRN    Or    ondansetron (ZOFRAN) injection 4 mg  4 mg IntraVENous Q6H PRN    enoxaparin (LOVENOX) injection 30 mg  30 mg SubCUTAneous Q24H    cholecalciferol (VITAMIN D3) (1000 Units /25 mcg) tablet 2,000 Units  2,000 Units Oral DAILY    aspirin delayed-release tablet 81 mg  81 mg Oral DAILY    pantoprazole (PROTONIX) tablet 40 mg  40 mg Oral ACB    clopidogreL (PLAVIX) tablet 75 mg  75 mg Oral DAILY    mirtazapine (REMERON) tablet 15 mg  15 mg Oral QHS    melatonin tablet 10 mg  10 mg Oral QHS    levothyroxine (SYNTHROID) tablet 75 mcg  75 mcg Oral ACB    azithromycin (ZITHROMAX) 500 mg in 0.9% sodium chloride 250 mL (VIAL-MATE)  500 mg IntraVENous Q24H    dexamethasone (DECADRON) 4 mg/mL injection 4 mg  4 mg IntraVENous Q8H    zinc sulfate (ZINCATE) 50 mg zinc (220 mg) capsule 1 Capsule  1 Capsule Oral DAILY    ascorbic acid (vitamin C) (VITAMIN C) tablet 250 mg  250 mg Oral BID    hydrALAZINE (APRESOLINE) 20 mg/mL injection 20 mg  20 mg IntraVENous Q6H PRN        Review of Systems  Review of Systems   Unable to perform ROS: Other   Constitutional: Negative. Patient hard of hearing but wrote out symptoms and she said no   Respiratory: Positive for cough. Negative for hemoptysis, shortness of breath and wheezing. Cardiovascular: Negative. Gastrointestinal: Negative. All other systems reviewed and are negative. Objective:     Visit Vitals  BP (!) 148/53 (BP 1 Location: Left upper arm, BP Patient Position: At rest)   Pulse 80   Temp 98.6 °F (37 °C)   Resp 16   Ht 5' (1.524 m)   Wt 61.2 kg (135 lb)   SpO2 (!) 87%   BMI 26.37 kg/m²    O2 Flow Rate (L/min): 5 l/min O2 Device: Nasal cannula    Temp (24hrs), Av.3 °F (36.8 °C), Min:98 °F (36.7 °C), Max:98.6 °F (37 °C)      No intake/output data recorded.    1901 -  0700  In: -   Out: 850 [Urine:850]    Recent Results (from the past 24 hour(s))   METABOLIC PANEL, COMPREHENSIVE    Collection Time: 21 11:46 AM   Result Value Ref Range    Sodium 144 136 - 145 mmol/L    Potassium 4.8 3.5 - 5.1 mmol/L    Chloride 111 (H) 97 - 108 mmol/L    CO2 24 21 - 32 mmol/L    Anion gap 9 5 - 15 mmol/L Glucose 148 (H) 65 - 100 mg/dL    BUN 38 (H) 6 - 20 mg/dL    Creatinine 0.96 0.55 - 1.02 mg/dL    BUN/Creatinine ratio 40 (H) 12 - 20      GFR est AA >60 >60 ml/min/1.73m2    GFR est non-AA 54 (L) >60 ml/min/1.73m2    Calcium 8.8 8.5 - 10.1 mg/dL    Bilirubin, total 0.7 0.2 - 1.0 mg/dL    AST (SGOT) 64 (H) 15 - 37 U/L    ALT (SGPT) 46 12 - 78 U/L    Alk. phosphatase 77 45 - 117 U/L    Protein, total 6.6 6.4 - 8.2 g/dL    Albumin 2.7 (L) 3.5 - 5.0 g/dL    Globulin 3.9 2.0 - 4.0 g/dL    A-G Ratio 0.7 (L) 1.1 - 2.2     CBC WITH AUTOMATED DIFF    Collection Time: 12/21/21 11:46 AM   Result Value Ref Range    WBC 15.2 (H) 3.6 - 11.0 K/uL    RBC 4.10 3.80 - 5.20 M/uL    HGB 13.7 11.5 - 16.0 g/dL    HCT 41.2 35.0 - 47.0 %    .5 (H) 80.0 - 99.0 FL    MCH 33.4 26.0 - 34.0 PG    MCHC 33.3 30.0 - 36.5 g/dL    RDW 14.3 11.5 - 14.5 %    PLATELET 237 403 - 515 K/uL    MPV 11.4 8.9 - 12.9 FL    NRBC 0.0 0.0  WBC    ABSOLUTE NRBC 0.00 0.00 - 0.01 K/uL    NEUTROPHILS 92 (H) 32 - 75 %    LYMPHOCYTES 3 (L) 12 - 49 %    MONOCYTES 4 (L) 5 - 13 %    EOSINOPHILS 0 0 - 7 %    BASOPHILS 0 0 - 1 %    IMMATURE GRANULOCYTES 1 (H) 0 - 0.5 %    ABS. NEUTROPHILS 14.0 (H) 1.8 - 8.0 K/UL    ABS. LYMPHOCYTES 0.5 (L) 0.8 - 3.5 K/UL    ABS. MONOCYTES 0.6 0.0 - 1.0 K/UL    ABS. EOSINOPHILS 0.0 0.0 - 0.4 K/UL    ABS. BASOPHILS 0.0 0.0 - 0.1 K/UL    ABS. IMM. GRANS. 0.2 (H) 0.00 - 0.04 K/UL    DF AUTOMATED     C REACTIVE PROTEIN, QT    Collection Time: 12/21/21 11:46 AM   Result Value Ref Range    C-Reactive protein 3.04 (H) 0.00 - 0.60 mg/dL   LD    Collection Time: 12/21/21 11:46 AM   Result Value Ref Range     (H) 81 - 246 U/L        XR CHEST PORT   Final Result   Worsening bilateral airspace disease compatible with multi lobar pneumonia   and/or edema in the appropriate clinical setting. XR CHEST PORT   Final Result   Subtle bilateral pulmonary parenchymal opacities. Please see above.            PHYSICAL EXAM:    Physical Exam  Vitals and nursing note reviewed. HENT:      Head: Normocephalic and atraumatic. Ears:      Comments: [place] hearing aides present bilaterally  Cardiovascular:      Rate and Rhythm: Normal rate and regular rhythm. Pulmonary:      Effort: No respiratory distress. Breath sounds: No wheezing. Comments: On nasal canula  Breathing sounds better  Musculoskeletal:      Right lower leg: No edema. Left lower leg: No edema. Skin:     General: Skin is warm. Capillary Refill: Capillary refill takes less than 2 seconds. Neurological:      Mental Status: She is alert. Data Review    Recent Results (from the past 24 hour(s))   METABOLIC PANEL, COMPREHENSIVE    Collection Time: 12/21/21 11:46 AM   Result Value Ref Range    Sodium 144 136 - 145 mmol/L    Potassium 4.8 3.5 - 5.1 mmol/L    Chloride 111 (H) 97 - 108 mmol/L    CO2 24 21 - 32 mmol/L    Anion gap 9 5 - 15 mmol/L    Glucose 148 (H) 65 - 100 mg/dL    BUN 38 (H) 6 - 20 mg/dL    Creatinine 0.96 0.55 - 1.02 mg/dL    BUN/Creatinine ratio 40 (H) 12 - 20      GFR est AA >60 >60 ml/min/1.73m2    GFR est non-AA 54 (L) >60 ml/min/1.73m2    Calcium 8.8 8.5 - 10.1 mg/dL    Bilirubin, total 0.7 0.2 - 1.0 mg/dL    AST (SGOT) 64 (H) 15 - 37 U/L    ALT (SGPT) 46 12 - 78 U/L    Alk.  phosphatase 77 45 - 117 U/L    Protein, total 6.6 6.4 - 8.2 g/dL    Albumin 2.7 (L) 3.5 - 5.0 g/dL    Globulin 3.9 2.0 - 4.0 g/dL    A-G Ratio 0.7 (L) 1.1 - 2.2     CBC WITH AUTOMATED DIFF    Collection Time: 12/21/21 11:46 AM   Result Value Ref Range    WBC 15.2 (H) 3.6 - 11.0 K/uL    RBC 4.10 3.80 - 5.20 M/uL    HGB 13.7 11.5 - 16.0 g/dL    HCT 41.2 35.0 - 47.0 %    .5 (H) 80.0 - 99.0 FL    MCH 33.4 26.0 - 34.0 PG    MCHC 33.3 30.0 - 36.5 g/dL    RDW 14.3 11.5 - 14.5 %    PLATELET 778 479 - 171 K/uL    MPV 11.4 8.9 - 12.9 FL    NRBC 0.0 0.0  WBC    ABSOLUTE NRBC 0.00 0.00 - 0.01 K/uL    NEUTROPHILS 92 (H) 32 - 75 %    LYMPHOCYTES 3 (L) 12 - 49 % MONOCYTES 4 (L) 5 - 13 %    EOSINOPHILS 0 0 - 7 %    BASOPHILS 0 0 - 1 %    IMMATURE GRANULOCYTES 1 (H) 0 - 0.5 %    ABS. NEUTROPHILS 14.0 (H) 1.8 - 8.0 K/UL    ABS. LYMPHOCYTES 0.5 (L) 0.8 - 3.5 K/UL    ABS. MONOCYTES 0.6 0.0 - 1.0 K/UL    ABS. EOSINOPHILS 0.0 0.0 - 0.4 K/UL    ABS. BASOPHILS 0.0 0.0 - 0.1 K/UL    ABS. IMM. GRANS. 0.2 (H) 0.00 - 0.04 K/UL    DF AUTOMATED     C REACTIVE PROTEIN, QT    Collection Time: 12/21/21 11:46 AM   Result Value Ref Range    C-Reactive protein 3.04 (H) 0.00 - 0.60 mg/dL   LD    Collection Time: 12/21/21 11:46 AM   Result Value Ref Range     (H) 81 - 246 U/L        Assessment/Plan:     Active Problems:    Essential hypertension (8/22/2017)      Acquired hypothyroidism (8/22/2017)      Hypoxia (12/17/2021)      Pneumonia due to COVID-19 virus (12/17/2021)      Dementia without behavioral disturbance ([facility] [address] (12/20/2021)      Acute respiratory failure with hypoxia ([facility] [address] (12/20/2021)        Hospital Course:    Dorita Dave is a 25-year-old female with a history of dementia, hypertension, hypothyroidism, and hyperlipidemia from [place] [address] that presented to the emergency room on 12/17/2021 for a 4-day history of cough. She was exposed to Covid. Rapid covid positive. Chest x-ray 12/17 shows signs concerning for pneumonia. Patient admitted for further Covid 19 work-up. Significant blood work for hypokalemia and creatinine of 1.53. Patient was placed on IV Decadron and azithromycin. Patient found to have elevated pro-Jose and LD. Pulmonology consulted. Elevated CRP. D-dimer normal. ABG 7.44/29/61 on 12/19 on nasal canula. Chest x-ray from 12/20 showing worsening bilateral airspace disease compatible with multilobar pneumonia and/or edema. Remdesivir started on 12/20/2021.12/17 blood: staphylococcus, coagulase negative will start on vancomycin. CXR 12/22 demonstrates resolution of the airspace disease previously  demonstrated within the lungs.  There is a milder degree residual groundglass  opacity and interstitial disease. Consider ID consult tomorrow    Acute hypoxic respiratory failure secondary to Covid pneumonia  LD increasing and CRP decreasing   Patient started on vancomycin  On remdesivir, IV azithromycin, IV Decadron, zinc, and vitamin C  Patient with increasing oxygen demand, now on high flow  Tessalon, cough syrup, and lozenges as needed  12/17 blood: staphylococcus, coagulase negative, prelim   One-to-one sitter  Speech therapy consulted  Pulmonology following    Hypertension  Continue norvasc  Hydralazine PRN    Hypothyroidism  Continue with Synthroid    Dementia without behavioral disturbances  Continue Remeron    DVT Prophylaxis: lovenox  GI Prophylaxis: protonix  Discharge and disposition barriers: >48 hr, remdesivir, IV decadron, and antibiotics    Sam Goltz, daughter Booker Gruber discussed with: Patient/Family, Nurse and     Total time spent with patient: 35 minutes.

## 2021-12-22 NOTE — PROGRESS NOTES
Writer noticed pt 02 sat was in the 70's pt was recently put on 10 l nc pt was still in the 70's low 80's, pt keeps pulling off the nc, nc then taped to stay on rt called rt stated that there was nothing she could do that she needed a sitter the Pa was notified and was going to call the rt to try to get on high flow will continue to monitor

## 2021-12-22 NOTE — PROGRESS NOTES
Pulmonology and Critical Care Progress Note    Subjective:     Chief Complaint:   Chief Complaint   Patient presents with    Cough          Patient seen and examined  Overnight as noted    Lying in bed comfortably   awake and alert  On 2 L nasal cannula oxygen  Gradual improvement in respiratory status  No acute distress    ABG 12/19 showed pH of 7.44, PCO2 29, PO2 61 on room air    Chest x-ray reviewed and shows worsening bilateral infiltrates      Review of Systems:  Review of systems not obtained due to patient factors. Current Facility-Administered Medications   Medication Dose Route Frequency Provider Last Rate Last Admin    VANCOMYCIN INFORMATION NOTE   Other Rx Dosing/Monitoring Shaka Prince MD        vancomycin (VANCOCIN) 750 mg in 0.9% sodium chloride 250 mL (VIAL-MATE)  750 mg IntraVENous Q24H Shaka Prince MD        [START ON 12/23/2021] Vancomycin trough level - please draw prior to dose on 12/23 @ 1000. Thanks!    Other ONCE Shaka Prince MD        benzocaine-menthoL (CEPACOL) lozenge 1 Lozenge  1 Lozenge Mucous Membrane PRN Aida Amezquita PA        cetirizine (ZYRTEC) tablet 5 mg  5 mg Oral DAILY PRN Lalitha España        remdesivir 100 mg in 0.9% sodium chloride 250 mL IVPB  100 mg IntraVENous Q24H Betty Tiwari MD   Held at 12/21/21 1600    benzonatate (TESSALON) capsule 200 mg  200 mg Oral TID Ana Laura Galindo PA-C   200 mg at 12/22/21 1057    guaiFENesin-codeine (ROBITUSSIN AC) 100-10 mg/5 mL solution 5 mL  5 mL Oral Q6H PRN Ana Laura Galindo PA-C   5 mL at 12/22/21 0723    amLODIPine (NORVASC) tablet 5 mg  5 mg Oral DAILY Ana Laura Galindo PA-C   5 mg at 12/22/21 1057    zinc oxide-white petrolatum 17-57 % topical paste   Topical TID Aminata Zapata PA-C   Given at 12/22/21 1059    sodium chloride (NS) flush 5-40 mL  5-40 mL IntraVENous Q8H Alona Marie MD   10 mL at 12/21/21 1306    sodium chloride (NS) flush 5-40 mL  5-40 mL IntraVENous PRN Benigno Cross MD        acetaminophen (TYLENOL) tablet 650 mg  650 mg Oral Q6H PRN Benigno Cross MD        Or   Sanjana Holguin acetaminophen (TYLENOL) suppository 650 mg  650 mg Rectal Q6H PRN Benigno Cross MD        ondansetron (ZOFRAN ODT) tablet 4 mg  4 mg Oral Q6H PRN Benigno Cross MD        Or    ondansetron [facility]) injection 4 mg  4 mg IntraVENous Q6H PRN Benigno Cross MD        enoxaparin (LOVENOX) injection 30 mg  30 mg SubCUTAneous Q24H Benigno Cross MD   30 mg at 12/22/21 0701    cholecalciferol (VITAMIN D3) (1000 Units /25 mcg) tablet 2,000 Units  2,000 Units Oral DAILY Benigno Cross MD   2,000 Units at 12/22/21 1057    aspirin delayed-release tablet 81 mg  81 mg Oral DAILY Benigno Cross MD   81 mg at 12/22/21 1058    pantoprazole (PROTONIX) tablet 40 mg  40 mg Oral ACB Benigno Cross MD   40 mg at 12/22/21 0701    clopidogreL (PLAVIX) tablet 75 mg  75 mg Oral DAILY Benigno Cross MD   75 mg at 12/22/21 1057    mirtazapine (REMERON) tablet 15 mg  15 mg Oral QHS Benigno Cross MD   15 mg at 12/21/21 2301    melatonin tablet 10 mg  10 mg Oral QHS Benigno Cross MD   10 mg at 12/21/21 2301    levothyroxine (SYNTHROID) tablet 75 mcg  75 mcg Oral SHAYLA Cross MD   75 mcg at 12/22/21 0701    azithromycin (ZITHROMAX) 500 mg in 0.9% sodium chloride 250 mL (VIAL-MATE)  500 mg IntraVENous Q24H Ana Laura Galindo PA-C 250 mL/hr at 12/20/21 2213 500 mg at 12/20/21 2213    dexamethasone (DECADRON) 4 mg/mL injection 4 mg  4 mg IntraVENous Q8H Ana Laura Galindo PA-C   4 mg at 12/21/21 1307    zinc sulfate (ZINCATE) 50 mg zinc (220 mg) capsule 1 Capsule  1 Capsule Oral DAILY Ana Laura Galindo PA-C   1 Capsule at 12/22/21 1057    ascorbic acid (vitamin C) (VITAMIN C) tablet 250 mg  250 mg Oral BID Ana Laura Galindo PA-LALO   250 mg at 12/22/21 1057    hydrALAZINE (APRESOLINE) 20 mg/mL injection 20 mg  20 mg IntraVENous Q6H PRN Ana Laura Galindo PA-C                No Known Allergies        Objective:     Blood pressure (!) 153/73, pulse (!) 107, temperature 97.5 °F (36.4 °C), resp. rate 18, height 5' (1.524 m), weight 61.2 kg (135 lb), SpO2 95 %. Temp (24hrs), Av °F (36.7 °C), Min:97.5 °F (36.4 °C), Max:98.6 °F (37 °C)      Intake and Output:  Current Shift: No intake/output data recorded. Last 3 Shifts:  1901 -  0700  In: -   Out: 850 [Urine:850]    Physical Exam:    General: Lying in bed comfortably, no acute distress  Eye: Reactive, symmetric  Throat and Neck: Supple  Lung: Reduced air entry bilaterally with prolonged exhalation but no wheezing. Occasional crackles. Heart: S1+S2. No murmurs  Abdomen: soft, non-tender. Bowel sounds normal. No masses; obese  Extremities: No edema  : Not done  Skin: No cyanosis  Neurologic: A & O x3. Grossly nonfocal  Psychiatric: Appropriate affect; coherent    Lab/Data Review:  Recent Results (from the past 24 hour(s))   METABOLIC PANEL, COMPREHENSIVE    Collection Time: 21 11:46 AM   Result Value Ref Range    Sodium 144 136 - 145 mmol/L    Potassium 4.8 3.5 - 5.1 mmol/L    Chloride 111 (H) 97 - 108 mmol/L    CO2 24 21 - 32 mmol/L    Anion gap 9 5 - 15 mmol/L    Glucose 148 (H) 65 - 100 mg/dL    BUN 38 (H) 6 - 20 mg/dL    Creatinine 0.96 0.55 - 1.02 mg/dL    BUN/Creatinine ratio 40 (H) 12 - 20      GFR est AA >60 >60 ml/min/1.73m2    GFR est non-AA 54 (L) >60 ml/min/1.73m2    Calcium 8.8 8.5 - 10.1 mg/dL    Bilirubin, total 0.7 0.2 - 1.0 mg/dL    AST (SGOT) 64 (H) 15 - 37 U/L    ALT (SGPT) 46 12 - 78 U/L    Alk.  phosphatase 77 45 - 117 U/L    Protein, total 6.6 6.4 - 8.2 g/dL    Albumin 2.7 (L) 3.5 - 5.0 g/dL    Globulin 3.9 2.0 - 4.0 g/dL    A-G Ratio 0.7 (L) 1.1 - 2.2     CBC WITH AUTOMATED DIFF    Collection Time: 21 11:46 AM   Result Value Ref Range    WBC 15.2 (H) 3.6 - 11.0 K/uL    RBC 4.10 3.80 - 5.20 M/uL    HGB 13.7 11.5 - 16.0 g/dL    HCT 41.2 35.0 - 47.0 %    .5 (H) 80.0 - 99.0 FL    MCH 33.4 26.0 - 34.0 PG    MCHC 33.3 30.0 - 36.5 g/dL    RDW 14.3 11.5 - 14.5 %    PLATELET 561 019 - 745 K/uL    MPV 11.4 8.9 - 12.9 FL    NRBC 0.0 0.0  WBC    ABSOLUTE NRBC 0.00 0.00 - 0.01 K/uL    NEUTROPHILS 92 (H) 32 - 75 %    LYMPHOCYTES 3 (L) 12 - 49 %    MONOCYTES 4 (L) 5 - 13 %    EOSINOPHILS 0 0 - 7 %    BASOPHILS 0 0 - 1 %    IMMATURE GRANULOCYTES 1 (H) 0 - 0.5 %    ABS. NEUTROPHILS 14.0 (H) 1.8 - 8.0 K/UL    ABS. LYMPHOCYTES 0.5 (L) 0.8 - 3.5 K/UL    ABS. MONOCYTES 0.6 0.0 - 1.0 K/UL    ABS. EOSINOPHILS 0.0 0.0 - 0.4 K/UL    ABS. BASOPHILS 0.0 0.0 - 0.1 K/UL    ABS. IMM. GRANS. 0.2 (H) 0.00 - 0.04 K/UL    DF AUTOMATED     C REACTIVE PROTEIN, QT    Collection Time: 12/21/21 11:46 AM   Result Value Ref Range    C-Reactive protein 3.04 (H) 0.00 - 0.60 mg/dL   LD    Collection Time: 12/21/21 11:46 AM   Result Value Ref Range     (H) 81 - 246 U/L     chest X-ray      XR CHEST PORT   Final Result   Worsening bilateral airspace disease compatible with multi lobar pneumonia   and/or edema in the appropriate clinical setting. XR CHEST PORT   Final Result   Subtle bilateral pulmonary parenchymal opacities. Please see above. CT Results  (Last 48 hours)    None          Assessment:     1. Acute respiratory failure with hypoxia  2. COVID-19 pneumonia  3. History of dementia  4. Hypertension  5. Hypothyroidism  6.   Dyslipidemia    Plan:     Patient admitted to the hospital  Will be watched here closely    Currently in 2 L nasal cannula oxygen  We will use supplemental oxygen as needed to keep saturation above 92%    Chest x-ray reviewed  Patient is COVID-19 positive status  Continue dexamethasone  Back on oxygen with worsening bilateral infiltrates on chest x-ray  Remdesivir started 12/20/2021  Continue vitamin C and zinc  Tessalon Perles  IV azithromycin    Continue blood pressure medications  Continue Synthroid  Continue Remeron    DVT and GI prophylaxis    Tried to contact daughter twice yesterday without success. Was able to get in touch with her sister. Questions of patient were answered at bedside in detail  Case discussed in detail with RN, RT, and care team    Thank you for involving me in the care of this patient  I will follow with you closely during hospitalization    Time spent more than 30 minutes in direct patient care with no overlap reviewing results and records, decision-making, and answering questions.       Mario Marie MD  [facility]  12/22/2021  1:04 PM

## 2021-12-22 NOTE — ROUTINE PROCESS
Pt sat were still low on 10L nc PA was notified tried to call RT, writer called unit manager, unit manager went into room and called RT, pt going on high flow ordered cxr and abg per PA

## 2021-12-23 NOTE — PROGRESS NOTES
Consult for Vancomycin Dosing by Pharmacy by Dr. Liya James provided for this 80y.o. year old female , for indication of bacteremia. Day of Therapy: 3  Goal of Level(s): (AUC = 400-600) or (trough = 10-15mcg/dL)     Other Current Antibiotics: none    Significant Cultures:       Date                             Culture                                       Organism      12/17                            blood  1/4 bottles                       Staph    All Micro Results       Procedure Component Value Units Date/Time    CULTURE, BLOOD, PAIRED [[id number]]  (Abnormal) Collected: 12/17/21 1915    Order Status: Completed Specimen: Blood Updated: 12/21/21 1007     Special Requests: No Special Requests        Culture result:       Staphylococcus species, coagulase negative growing in 1 of 4 bottles drawn SITE=(NO SITE)                  preliminary report of Gram Positive Cocci in clusters growing in 1 of 4 bottles drawn                  remaining bottle(s) has/have no growth so far            (NOTE) CALLED POSITIVE BLOOD CULTURE OF GPC IN CLUSTERS GROWING IN 1 OUT OF 4 BOTTLES TO [place], [place] AT [address] [facility] ON 12/20/21. AT    SARS-COV-2 [[id number]]  (Abnormal) Collected: 12/17/21 1918    Order Status: Completed Specimen: Nasopharyngeal Updated: 12/17/21 2134     SARS-CoV-2 DETECTED        Comment:   The specimen is POSITIVE for SARS-CoV-2, the novel coronavirus associated with COVID-19. This test has been authorized by the FDA under an Emergency Use Authorization (EUA) for use by authorized laboratories.    Fact sheet for Healthcare Providers: ConventionUpdate.co.nz Fact sheet for Patients: ConventionUpdate.co.nz   Methodology: Rapid RT-PCR Results verified, phoned [email or web address] [employer] (RAPID TEST) [[id number]] Collected: 12/17/21 1915    Order Status: Completed Specimen: Nasopharyngeal from Nasal washing Updated: 12/17/21 2019 Influenza A Antigen Negative        Influenza B Antigen Negative               Serum Creatinine Creatinine   Date/Time Value Ref Range Status   12/23/2021 08:24 AM 0.80 0.55 - 1.02 mg/dL Final   12/22/2021 12:22 PM 0.99 0.55 - 1.02 mg/dL Final   12/21/2021 11:46 AM 0.96 0.55 - 1.02 mg/dL Final      Creatinine Clearance Estimated Creatinine Clearance: 37.5 mL/min (based on SCr of 0.8 mg/dL). BUN Lab Results   Component Value Date/Time    BUN 40 (H) 12/23/2021 08:24 AM      WBC Lab Results   Component Value Date/Time    WBC 15.2 (H) 12/23/2021 08:24 AM      Temp Temp Readings from Last 1 Encounters:   12/23/21 97.9 °F (36.6 °C)      C-Reactive Protein C-Reactive protein   Date Value Ref Range Status   12/23/2021 11.20 (H) 0.00 - 0.60 mg/dL Final   12/22/2021 7.08 (H) 0.00 - 0.60 mg/dL Final   12/21/2021 3.04 (H) 0.00 - 0.60 mg/dL Final      Procalcitonin Lab Results   Component Value Date/Time    Procalcitonin 0.19 (H) 12/18/2021 06:03 AM        Last Level:   Vancomycin,trough   Date/Time Value Ref Range Status   12/23/2021 08:24 AM 1.8 (L) 5.0 - 10.0 ug/mL Final     Comment:        Trough levels of 15-20 ug/mL should be targeted for patients with coagulase negative Staphylococcus and MRSA pneumonia, endocarditis,osteomyelitis, meningitis, and bacteremia, as well as patients not responding to lower levels. Trough levels of 10-15 ug/mL for infections from other sources (e.g. urinary tract, cellulitis) are appropriate. All patients receiving concomitant nephrotoxic therapies should have their function closely monitored regardless of peak or trough levels.       No results found for: VANCR    Ht Readings from Last 1 Encounters:   12/21/21 152.4 cm (60\")        Wt Readings from Last 1 Encounters:   12/17/21 61.2 kg (135 lb)     Ideal body weight: 45.5 kg (100 lb 4.9 oz)  Adjusted ideal body weight: 51.8 kg (114 lb 3 oz)     Previous Regimen: 1250mg loading dose 12/21, 750mg IV Q24H     New Regimen: Continue 750mg IV Q24H  Vancomycin level 1.8 today. Vancomycin dose not given yesterday, no IV access per nursing note. Will reload with 1250mg IV x 1 today, followed by 750mg IV Q24H. Trough level 12/26 at 10:00    Pharmacy to follow daily and will make changes to dose and/or frequency based on clinical status.      _________________________________     SANCHEZ White CRISPIN [facility]

## 2021-12-23 NOTE — PROGRESS NOTES
Pulmonology and Critical Care Progress Note    Subjective:     Chief Complaint:   Chief Complaint   Patient presents with    Cough          Patient seen and examined  Overnight as noted    Lying in bed comfortably   awake and alert   now on high flow oxygen 60 L 90% since yesterday  Gradual improvement in respiratory status  No acute distress    ABG 12/19 showed pH of 7.44, PCO2 29, PO2 61 on room air    Chest x-ray 11/22 reviewed and shows worsening bilateral infiltrates      Review of Systems:  Review of systems not obtained due to patient factors.     Current Facility-Administered Medications   Medication Dose Route Frequency Provider Last Rate Last Admin    vancomycin (VANCOCIN) 1,250 mg in 0.9% sodium chloride 250 mL (VIAL-MATE)  1,250 mg IntraVENous ONCE Shaka Prince MD        [START ON 12/24/2021] vancomycin (VANCOCIN) 750 mg in 0.9% sodium chloride 250 mL (VIAL-MATE)  750 mg IntraVENous Q24H Ana Rosa Prince MD        VANCOMYCIN INFORMATION NOTE   Other Rx Dosing/Monitoring Shaka Prince MD        benzocaine-menthoL (CEPACOL) lozenge 1 Lozenge  1 Lozenge Mucous Membrane PRN Angela Amezquita PA        cetirizine (ZYRTEC) tablet 5 mg  5 mg Oral DAILY PRN Lalitha Manley        remdesivir 100 mg in 0.9% sodium chloride 250 mL IVPB  100 mg IntraVENous Q24H Corona Abraham  mL/hr at 12/22/21 1727 100 mg at 12/22/21 1727    benzonatate (TESSALON) capsule 200 mg  200 mg Oral TID Leveda Jasbir PA-C   200 mg at 12/23/21 0830    guaiFENesin-codeine (ROBITUSSIN AC) 100-10 mg/5 mL solution 5 mL  5 mL Oral Q6H PRN Ana Laura Galindo PA-C   5 mL at 12/22/21 0723    amLODIPine (NORVASC) tablet 5 mg  5 mg Oral DAILY Ana Laura Galindo PA-C   5 mg at 12/23/21 0830    zinc oxide-white petrolatum 17-57 % topical paste   Topical TID Leveda Fullkunal PA-C   Given at 12/23/21 0900    sodium chloride (NS) flush 5-40 mL  5-40 mL IntraVENous Q8H Vazquez Jose MD   10 mL at 12/23/21 0546    sodium chloride (NS) flush 5-40 mL  5-40 mL IntraVENous PRN Steff Erazo MD        acetaminophen (TYLENOL) tablet 650 mg  650 mg Oral Q6H PRN Steff Erazo MD        Or   [address] [place] [address] acetaminophen (TYLENOL) suppository 650 mg  650 mg Rectal Q6H PRN Steff Earzo MD        ondansetron (ZOFRAN ODT) tablet 4 mg  4 mg Oral Q6H PRN Steff Erazo MD        Or    [facility] 4 mg  4 mg IntraVENous Q6H PRN Steff Erazo MD        enoxaparin (LOVENOX) injection 30 mg  30 mg SubCUTAneous Q24H Steff Erazo MD   30 mg at 12/23/21 0529    cholecalciferol (VITAMIN D3) (1000 Units /25 mcg) tablet 2,000 Units  2,000 Units Oral DAILY Steff Erazo MD   2,000 Units at 12/23/21 0831    aspirin delayed-release tablet 81 mg  81 mg Oral DAILY Steff Erazo MD   81 mg at 12/23/21 0830    pantoprazole (PROTONIX) tablet 40 mg  40 mg Oral ACB Steff Erazo MD   40 mg at 12/23/21 0831    clopidogreL (PLAVIX) tablet 75 mg  75 mg Oral DAILY Steff Erazo MD   75 mg at 12/23/21 0831    mirtazapine (REMERON) tablet 15 mg  15 mg Oral QHS Steff Earzo MD   15 mg at 12/22/21 2253    melatonin tablet 10 mg  10 mg Oral QHS Steff Erazo MD   10 mg at 12/22/21 2254    levothyroxine (SYNTHROID) tablet 75 mcg  75 mcg Oral ACB Steff Erazo MD   75 mcg at 12/23/21 0831    dexamethasone (DECADRON) 4 mg/mL injection 4 mg  4 mg IntraVENous Q8H Ana Laura Galindo PA-C   4 mg at 12/23/21 0529    zinc sulfate (ZINCATE) 50 mg zinc (220 mg) capsule 1 Capsule  1 Capsule Oral DAILY Ana Laura Galindo PA-C   1 Capsule at 12/22/21 1057    ascorbic acid (vitamin C) (VITAMIN C) tablet 250 mg  250 mg Oral BID Ana Laura Galindo PA-C   250 mg at 12/23/21 0830    hydrALAZINE (APRESOLINE) 20 mg/mL injection 20 mg  20 mg IntraVENous Q6H PRN Ana Luara Galindo PA-C                No Known Allergies        Objective:     Blood pressure (!) 156/89, pulse (!) 56, temperature 97.9 °F (36.6 °C), resp. rate 16, height 5' (1.524 m), weight 61.2 kg (135 lb), SpO2 94 %. Temp (24hrs), Av.8 °F (36.6 °C), Min:96.6 °F (35.9 °C), Max:98.4 °F (36.9 °C)      Intake and Output:  Current Shift: No intake/output data recorded. Last 3 Shifts: No intake/output data recorded. Physical Exam:    General: Lying in bed comfortably, no acute distress  Eye: Reactive, symmetric  Throat and Neck: Supple  Lung: Reduced air entry bilaterally with prolonged exhalation but no wheezing. Occasional crackles. Heart: S1+S2. No murmurs  Abdomen: soft, non-tender. Bowel sounds normal. No masses; obese  Extremities: No edema  : Not done  Skin: No cyanosis  Neurologic: A & O x3. Grossly nonfocal  Psychiatric: Appropriate affect; coherent    Lab/Data Review:  Recent Results (from the past 24 hour(s))   METABOLIC PANEL, COMPREHENSIVE    Collection Time: 21 12:22 PM   Result Value Ref Range    Sodium 142 136 - 145 mmol/L    Potassium 4.1 3.5 - 5.1 mmol/L    Chloride 110 (H) 97 - 108 mmol/L    CO2 23 21 - 32 mmol/L    Anion gap 9 5 - 15 mmol/L    Glucose 112 (H) 65 - 100 mg/dL    BUN 42 (H) 6 - 20 mg/dL    Creatinine 0.99 0.55 - 1.02 mg/dL    BUN/Creatinine ratio 42 (H) 12 - 20      GFR est AA >60 >60 ml/min/1.73m2    GFR est non-AA 53 (L) >60 ml/min/1.73m2    Calcium 9.1 8.5 - 10.1 mg/dL    Bilirubin, total 0.8 0.2 - 1.0 mg/dL    AST (SGOT) 96 (H) 15 - 37 U/L    ALT (SGPT) 65 12 - 78 U/L    Alk.  phosphatase 84 45 - 117 U/L    Protein, total 6.6 6.4 - 8.2 g/dL    Albumin 3.1 (L) 3.5 - 5.0 g/dL    Globulin 3.5 2.0 - 4.0 g/dL    A-G Ratio 0.9 (L) 1.1 - 2.2     CBC WITH AUTOMATED DIFF    Collection Time: 21 12:22 PM   Result Value Ref Range    WBC 22.4 (H) 3.6 - 11.0 K/uL    RBC 3.82 3.80 - 5.20 M/uL    HGB 12.7 11.5 - 16.0 g/dL    HCT 37.9 35.0 - 47.0 %    MCV 99.2 (H) 80.0 - 99.0 FL    MCH 33.2 26.0 - 34.0 PG    MCHC 33.5 30.0 - 36.5 g/dL    RDW 14.5 11.5 - 14.5 % PLATELET 979 830 - 545 K/uL    MPV 11.2 8.9 - 12.9 FL    NRBC 0.1 (H) 0.0  WBC    ABSOLUTE NRBC 0.02 (H) 0.00 - 0.01 K/uL    NEUTROPHILS 92 (H) 32 - 75 %    LYMPHOCYTES 2 (L) 12 - 49 %    MONOCYTES 4 (L) 5 - 13 %    EOSINOPHILS 0 0 - 7 %    BASOPHILS 0 0 - 1 %    IMMATURE GRANULOCYTES 2 (H) 0 - 0.5 %    ABS. NEUTROPHILS 20.5 (H) 1.8 - 8.0 K/UL    ABS. LYMPHOCYTES 0.4 (L) 0.8 - 3.5 K/UL    ABS. MONOCYTES 1.0 0.0 - 1.0 K/UL    ABS. EOSINOPHILS 0.0 0.0 - 0.4 K/UL    ABS. BASOPHILS 0.1 0.0 - 0.1 K/UL    ABS. IMM.  GRANS. 0.4 (H) 0.00 - 0.04 K/UL    DF AUTOMATED     LD    Collection Time: 12/22/21 12:22 PM   Result Value Ref Range     (H) 81 - 246 U/L   C REACTIVE PROTEIN, QT    Collection Time: 12/22/21 12:22 PM   Result Value Ref Range    C-Reactive protein 7.08 (H) 0.00 - 0.60 mg/dL   BLOOD GAS, ARTERIAL    Collection Time: 12/22/21  4:50 PM   Result Value Ref Range    pH 7.45 7.35 - 7.45      PCO2 33 (L) 35 - 45 mmHg    PO2 118 (H) 75 - 100 mmHg    O2 SAT 99 >95 %    BICARBONATE 24 22 - 26 mmol/L    BASE DEFICIT 0.7 0 - 2 mmol/L    O2 METHOD High Flow O2      O2 FLOW RATE 60.0 L/min    FIO2 100.0 %    EPAP/CPAP/PEEP 0      SITE Right Radial      ESTEPHANIE'S TEST PASS     C REACTIVE PROTEIN, QT    Collection Time: 12/23/21  8:24 AM   Result Value Ref Range    C-Reactive protein 11.20 (H) 0.00 - 0.60 mg/dL   LD    Collection Time: 12/23/21  8:24 AM   Result Value Ref Range     (H) 81 - 446 U/L   METABOLIC PANEL, COMPREHENSIVE    Collection Time: 12/23/21  8:24 AM   Result Value Ref Range    Sodium 144 136 - 145 mmol/L    Potassium 3.9 3.5 - 5.1 mmol/L    Chloride 114 (H) 97 - 108 mmol/L    CO2 25 21 - 32 mmol/L    Anion gap 5 5 - 15 mmol/L    Glucose 126 (H) 65 - 100 mg/dL    BUN 40 (H) 6 - 20 mg/dL    Creatinine 0.80 0.55 - 1.02 mg/dL    BUN/Creatinine ratio 50 (H) 12 - 20      GFR est AA >60 >60 ml/min/1.73m2    GFR est non-AA >60 >60 ml/min/1.73m2    Calcium 9.4 8.5 - 10.1 mg/dL    Bilirubin, total 0.6 0.2 - 1.0 mg/dL    AST (SGOT) 67 (H) 15 - 37 U/L    ALT (SGPT) 64 12 - 78 U/L    Alk. phosphatase 88 45 - 117 U/L    Protein, total 6.8 6.4 - 8.2 g/dL    Albumin 2.7 (L) 3.5 - 5.0 g/dL    Globulin 4.1 (H) 2.0 - 4.0 g/dL    A-G Ratio 0.7 (L) 1.1 - 2.2     CBC WITH AUTOMATED DIFF    Collection Time: 12/23/21  8:24 AM   Result Value Ref Range    WBC 15.2 (H) 3.6 - 11.0 K/uL    RBC 3.91 3.80 - 5.20 M/uL    HGB 13.0 11.5 - 16.0 g/dL    HCT 38.9 35.0 - 47.0 %    MCV 99.5 (H) 80.0 - 99.0 FL    MCH 33.2 26.0 - 34.0 PG    MCHC 33.4 30.0 - 36.5 g/dL    RDW 14.3 11.5 - 14.5 %    PLATELET 424 422 - 524 K/uL    MPV 11.3 8.9 - 12.9 FL    NRBC 0.0 0.0  WBC    ABSOLUTE NRBC 0.00 0.00 - 0.01 K/uL    NEUTROPHILS 93 (H) 32 - 75 %    LYMPHOCYTES 3 (L) 12 - 49 %    MONOCYTES 3 (L) 5 - 13 %    EOSINOPHILS 0 0 - 7 %    BASOPHILS 0 0 - 1 %    IMMATURE GRANULOCYTES 1 (H) 0 - 0.5 %    ABS. NEUTROPHILS 14.1 (H) 1.8 - 8.0 K/UL    ABS. LYMPHOCYTES 0.4 (L) 0.8 - 3.5 K/UL    ABS. MONOCYTES 0.5 0.0 - 1.0 K/UL    ABS. EOSINOPHILS 0.0 0.0 - 0.4 K/UL    ABS. BASOPHILS 0.0 0.0 - 0.1 K/UL    ABS. IMM. GRANS. 0.2 (H) 0.00 - 0.04 K/UL    DF AUTOMATED     VANCOMYCIN, TROUGH    Collection Time: 12/23/21  8:24 AM   Result Value Ref Range    Vancomycin,trough 1.8 (L) 5.0 - 10.0 ug/mL    Reported dose date Not provided      Reported dose: Not provided Units     chest X-ray      XR CHEST PORT   Final Result   1. There has been are resolution of the airspace disease previously   demonstrated within the lungs. There is a milder degree residual groundglass   opacity and interstitial disease. The differential diagnosis would include   partially resolved interstitial and alveolar edema versus partially resolved   pneumonitis/aspiration pneumonia. I favor edema given the rapid interval   decrease in the airspace process. 2.  The patient is status post a previous transfemoral aortic valve replacement.       XR CHEST PORT   Final Result   Worsening bilateral airspace disease compatible with multi lobar pneumonia   and/or edema in the appropriate clinical setting. XR CHEST PORT   Final Result   Subtle bilateral pulmonary parenchymal opacities. Please see above. CT Results  (Last 48 hours)    None          Assessment:     1. Acute respiratory failure with hypoxia  2. COVID-19 pneumonia  3. History of dementia  4. Hypertension  5. Hypothyroidism  6. Dyslipidemia    Plan:     Patient admitted to the hospital  Will be watched here closely    On high flow oxygen 60 L 90% FiO2  Chest x-ray stable  Continue oxygen supplementation as needed to keep saturation above 92%    Chest x-ray reviewed  Patient is COVID-19 positive status  Continue dexamethasone  Back on oxygen with worsening bilateral infiltrates on chest x-ray  Remdesivir started 12/20/2021  Last D-dimer 12/20 was 0.3  We will repeat D-dimer today need for therapeutic anticoagulation  Continue vitamin C and zinc  Tessalon Perles  IV azithromycin    Continue blood pressure medications  Continue Synthroid  Continue Remeron    DVT and GI prophylaxis    Cussed with patient's daughter Evelyn Brown over the phone in detail yesterday, 12/22 and all questions were answered. Questions of patient were answered at bedside in detail  Case discussed in detail with RN, RT, and care team    Thank you for involving me in the care of this patient  I will follow with you closely during hospitalization    Time spent more than 30 minutes in direct patient care with no overlap reviewing results and records, decision-making, and answering questions.       Latisha Godwin MD  [facility]  12/23/2021  1:04 PM

## 2021-12-23 NOTE — PROGRESS NOTES
Hospitalist Progress Note    Subjective:   Daily Progress Note: 12/23/2021 10:02 AM    Patient was coughing less today but was having decreased oxygen saturation. Consulted respiratory therapy for increasing oxygen demand and SPO2 of 87 this morning on 5 L of nasal cannula. Patient originally was having taking off her nasal cannula. Later in the day it was secured. But was still not saturating well enough reconsulted respiratory therapy who placed her on high flow which she is now adequately saturating at. Patient placed on one-to-one.      Current Facility-Administered Medications   Medication Dose Route Frequency    [START ON 12/24/2021] vancomycin (VANCOCIN) 750 mg in 0.9% sodium chloride 250 mL (VIAL-MATE)  750 mg IntraVENous Q24H    [START ON 12/26/2021] Vancomycin trough level 12/26 at 10:00   Other ONCE    VANCOMYCIN INFORMATION NOTE   Other Rx Dosing/Monitoring    benzocaine-menthoL (CEPACOL) lozenge 1 Lozenge  1 Lozenge Mucous Membrane PRN    cetirizine (ZYRTEC) tablet 5 mg  5 mg Oral DAILY PRN    remdesivir 100 mg in 0.9% sodium chloride 250 mL IVPB  100 mg IntraVENous Q24H    benzonatate (TESSALON) capsule 200 mg  200 mg Oral TID    guaiFENesin-codeine (ROBITUSSIN AC) 100-10 mg/5 mL solution 5 mL  5 mL Oral Q6H PRN    amLODIPine (NORVASC) tablet 5 mg  5 mg Oral DAILY    zinc oxide-white petrolatum 17-57 % topical paste   Topical TID    sodium chloride (NS) flush 5-40 mL  5-40 mL IntraVENous Q8H    sodium chloride (NS) flush 5-40 mL  5-40 mL IntraVENous PRN    acetaminophen (TYLENOL) tablet 650 mg  650 mg Oral Q6H PRN    Or    acetaminophen (TYLENOL) suppository 650 mg  650 mg Rectal Q6H PRN    ondansetron (ZOFRAN ODT) tablet 4 mg  4 mg Oral Q6H PRN    Or    ondansetron (ZOFRAN) injection 4 mg  4 mg IntraVENous Q6H PRN    enoxaparin (LOVENOX) injection 30 mg  30 mg SubCUTAneous Q24H    cholecalciferol (VITAMIN D3) (1000 Units /25 mcg) tablet 2,000 Units  2,000 Units Oral DAILY    aspirin delayed-release tablet 81 mg  81 mg Oral DAILY    pantoprazole (PROTONIX) tablet 40 mg  40 mg Oral ACB    clopidogreL (PLAVIX) tablet 75 mg  75 mg Oral DAILY    mirtazapine (REMERON) tablet 15 mg  15 mg Oral QHS    melatonin tablet 10 mg  10 mg Oral QHS    levothyroxine (SYNTHROID) tablet 75 mcg  75 mcg Oral ACB    dexamethasone (DECADRON) 4 mg/mL injection 4 mg  4 mg IntraVENous Q8H    zinc sulfate (ZINCATE) 50 mg zinc (220 mg) capsule 1 Capsule  1 Capsule Oral DAILY    ascorbic acid (vitamin C) (VITAMIN C) tablet 250 mg  250 mg Oral BID    hydrALAZINE (APRESOLINE) 20 mg/mL injection 20 mg  20 mg IntraVENous Q6H PRN        Review of Systems  Review of Systems   Unable to perform ROS: Other   Constitutional: Negative. Patient hard of hearing but wrote out symptoms and she said no   Respiratory: Positive for cough. Negative for hemoptysis, shortness of breath and wheezing. Cardiovascular: Negative. Gastrointestinal: Negative. All other systems reviewed and are negative. Objective:     Visit Vitals  /66   Pulse 68   Temp 97.8 °F (36.6 °C)   Resp 16   Ht 5' (1.524 m)   Wt 61.2 kg (135 lb)   SpO2 93%   BMI 26.37 kg/m²    O2 Flow Rate (L/min): 60 l/min O2 Device: Heated,Hi flow nasal cannula    Temp (24hrs), Av.1 °F (36.7 °C), Min:97.8 °F (36.6 °C), Max:98.4 °F (36.9 °C)      No intake/output data recorded. No intake/output data recorded.     Recent Results (from the past 24 hour(s))   BLOOD GAS, ARTERIAL    Collection Time: 21  4:50 PM   Result Value Ref Range    pH 7.45 7.35 - 7.45      PCO2 33 (L) 35 - 45 mmHg    PO2 118 (H) 75 - 100 mmHg    O2 SAT 99 >95 %    BICARBONATE 24 22 - 26 mmol/L    BASE DEFICIT 0.7 0 - 2 mmol/L    O2 METHOD High Flow O2      O2 FLOW RATE 60.0 L/min    FIO2 100.0 %    EPAP/CPAP/PEEP 0      SITE Right Radial      ESTEPHANIE'S TEST PASS     C REACTIVE PROTEIN, QT    Collection Time: 21  8:24 AM   Result Value Ref Range    C-Reactive protein 11.20 (H) 0.00 - 0.60 mg/dL   LD    Collection Time: 12/23/21  8:24 AM   Result Value Ref Range     (H) 81 - 686 U/L   METABOLIC PANEL, COMPREHENSIVE    Collection Time: 12/23/21  8:24 AM   Result Value Ref Range    Sodium 144 136 - 145 mmol/L    Potassium 3.9 3.5 - 5.1 mmol/L    Chloride 114 (H) 97 - 108 mmol/L    CO2 25 21 - 32 mmol/L    Anion gap 5 5 - 15 mmol/L    Glucose 126 (H) 65 - 100 mg/dL    BUN 40 (H) 6 - 20 mg/dL    Creatinine 0.80 0.55 - 1.02 mg/dL    BUN/Creatinine ratio 50 (H) 12 - 20      GFR est AA >60 >60 ml/min/1.73m2    GFR est non-AA >60 >60 ml/min/1.73m2    Calcium 9.4 8.5 - 10.1 mg/dL    Bilirubin, total 0.6 0.2 - 1.0 mg/dL    AST (SGOT) 67 (H) 15 - 37 U/L    ALT (SGPT) 64 12 - 78 U/L    Alk. phosphatase 88 45 - 117 U/L    Protein, total 6.8 6.4 - 8.2 g/dL    Albumin 2.7 (L) 3.5 - 5.0 g/dL    Globulin 4.1 (H) 2.0 - 4.0 g/dL    A-G Ratio 0.7 (L) 1.1 - 2.2     CBC WITH AUTOMATED DIFF    Collection Time: 12/23/21  8:24 AM   Result Value Ref Range    WBC 15.2 (H) 3.6 - 11.0 K/uL    RBC 3.91 3.80 - 5.20 M/uL    HGB 13.0 11.5 - 16.0 g/dL    HCT 38.9 35.0 - 47.0 %    MCV 99.5 (H) 80.0 - 99.0 FL    MCH 33.2 26.0 - 34.0 PG    MCHC 33.4 30.0 - 36.5 g/dL    RDW 14.3 11.5 - 14.5 %    PLATELET 903 812 - 245 K/uL    MPV 11.3 8.9 - 12.9 FL    NRBC 0.0 0.0  WBC    ABSOLUTE NRBC 0.00 0.00 - 0.01 K/uL    NEUTROPHILS 93 (H) 32 - 75 %    LYMPHOCYTES 3 (L) 12 - 49 %    MONOCYTES 3 (L) 5 - 13 %    EOSINOPHILS 0 0 - 7 %    BASOPHILS 0 0 - 1 %    IMMATURE GRANULOCYTES 1 (H) 0 - 0.5 %    ABS. NEUTROPHILS 14.1 (H) 1.8 - 8.0 K/UL    ABS. LYMPHOCYTES 0.4 (L) 0.8 - 3.5 K/UL    ABS. MONOCYTES 0.5 0.0 - 1.0 K/UL    ABS. EOSINOPHILS 0.0 0.0 - 0.4 K/UL    ABS. BASOPHILS 0.0 0.0 - 0.1 K/UL    ABS. IMM.  GRANS. 0.2 (H) 0.00 - 0.04 K/UL    DF AUTOMATED     VANCOMYCIN, TROUGH    Collection Time: 12/23/21  8:24 AM   Result Value Ref Range    Vancomycin,trough 1.8 (L) 5.0 - 10.0 ug/mL    Reported dose date Not provided      Reported dose: Not provided Units   D DIMER    Collection Time: 12/23/21  1:40 PM   Result Value Ref Range    D DIMER 0.34 <0.50 ug/ml(FEU)        XR CHEST PORT   Final Result   1. There has been are resolution of the airspace disease previously   demonstrated within the lungs. There is a milder degree residual groundglass   opacity and interstitial disease. The differential diagnosis would include   partially resolved interstitial and alveolar edema versus partially resolved   pneumonitis/aspiration pneumonia. I favor edema given the rapid interval   decrease in the airspace process. 2.  The patient is status post a previous transfemoral aortic valve replacement. XR CHEST PORT   Final Result   Worsening bilateral airspace disease compatible with multi lobar pneumonia   and/or edema in the appropriate clinical setting. XR CHEST PORT   Final Result   Subtle bilateral pulmonary parenchymal opacities. Please see above. XR CHEST PORT    (Results Pending)        PHYSICAL EXAM:    Physical Exam  Vitals and nursing note reviewed. HENT:      Head: Normocephalic and atraumatic. Ears:      Comments: Pl[employer] hearing aides present bilaterally  Cardiovascular:      Rate and Rhythm: Normal rate and regular rhythm. Pulmonary:      Effort: No respiratory distress. Breath sounds: No wheezing. Comments: On nasal canula  Breathing sounds better  Musculoskeletal:      Right lower leg: No edema. Left lower leg: No edema. Skin:     General: Skin is warm. Capillary Refill: Capillary refill takes less than 2 seconds. Neurological:      Mental Status: She is alert.         Data Review    Recent Results (from the past 24 hour(s))   BLOOD GAS, ARTERIAL    Collection Time: 12/22/21  4:50 PM   Result Value Ref Range    pH 7.45 7.35 - 7.45      PCO2 33 (L) 35 - 45 mmHg    PO2 118 (H) 75 - 100 mmHg    O2 SAT 99 >95 %    BICARBONATE 24 22 - 26 mmol/L    BASE DEFICIT 0.7 0 - 2 mmol/L    O2 METHOD High Flow O2      O2 FLOW RATE 60.0 L/min    FIO2 100.0 %    EPAP/CPAP/PEEP 0      SITE Right Radial      ESTEPHANIE'S TEST PASS     C REACTIVE PROTEIN, QT    Collection Time: 12/23/21  8:24 AM   Result Value Ref Range    C-Reactive protein 11.20 (H) 0.00 - 0.60 mg/dL   LD    Collection Time: 12/23/21  8:24 AM   Result Value Ref Range     (H) 81 - 526 U/L   METABOLIC PANEL, COMPREHENSIVE    Collection Time: 12/23/21  8:24 AM   Result Value Ref Range    Sodium 144 136 - 145 mmol/L    Potassium 3.9 3.5 - 5.1 mmol/L    Chloride 114 (H) 97 - 108 mmol/L    CO2 25 21 - 32 mmol/L    Anion gap 5 5 - 15 mmol/L    Glucose 126 (H) 65 - 100 mg/dL    BUN 40 (H) 6 - 20 mg/dL    Creatinine 0.80 0.55 - 1.02 mg/dL    BUN/Creatinine ratio 50 (H) 12 - 20      GFR est AA >60 >60 ml/min/1.73m2    GFR est non-AA >60 >60 ml/min/1.73m2    Calcium 9.4 8.5 - 10.1 mg/dL    Bilirubin, total 0.6 0.2 - 1.0 mg/dL    AST (SGOT) 67 (H) 15 - 37 U/L    ALT (SGPT) 64 12 - 78 U/L    Alk. phosphatase 88 45 - 117 U/L    Protein, total 6.8 6.4 - 8.2 g/dL    Albumin 2.7 (L) 3.5 - 5.0 g/dL    Globulin 4.1 (H) 2.0 - 4.0 g/dL    A-G Ratio 0.7 (L) 1.1 - 2.2     CBC WITH AUTOMATED DIFF    Collection Time: 12/23/21  8:24 AM   Result Value Ref Range    WBC 15.2 (H) 3.6 - 11.0 K/uL    RBC 3.91 3.80 - 5.20 M/uL    HGB 13.0 11.5 - 16.0 g/dL    HCT 38.9 35.0 - 47.0 %    MCV 99.5 (H) 80.0 - 99.0 FL    MCH 33.2 26.0 - 34.0 PG    MCHC 33.4 30.0 - 36.5 g/dL    RDW 14.3 11.5 - 14.5 %    PLATELET 374 884 - 549 K/uL    MPV 11.3 8.9 - 12.9 FL    NRBC 0.0 0.0  WBC    ABSOLUTE NRBC 0.00 0.00 - 0.01 K/uL    NEUTROPHILS 93 (H) 32 - 75 %    LYMPHOCYTES 3 (L) 12 - 49 %    MONOCYTES 3 (L) 5 - 13 %    EOSINOPHILS 0 0 - 7 %    BASOPHILS 0 0 - 1 %    IMMATURE GRANULOCYTES 1 (H) 0 - 0.5 %    ABS. NEUTROPHILS 14.1 (H) 1.8 - 8.0 K/UL    ABS. LYMPHOCYTES 0.4 (L) 0.8 - 3.5 K/UL    ABS. MONOCYTES 0.5 0.0 - 1.0 K/UL    ABS. EOSINOPHILS 0.0 0.0 - 0.4 K/UL    ABS.  BASOPHILS 0.0 0.0 - 0.1 K/UL    ABS. IMM. GRANS. 0.2 (H) 0.00 - 0.04 K/UL    DF AUTOMATED     VANCOMYCIN, TROUGH    Collection Time: 12/23/21  8:24 AM   Result Value Ref Range    Vancomycin,trough 1.8 (L) 5.0 - 10.0 ug/mL    Reported dose date Not provided      Reported dose: Not provided Units   D DIMER    Collection Time: 12/23/21  1:40 PM   Result Value Ref Range    D DIMER 0.34 <0.50 ug/ml(FEU)        Assessment/Plan:     Active Problems:    Essential hypertension (8/22/2017)      Acquired hypothyroidism (8/22/2017)      Hypoxia (12/17/2021)      Pneumonia due to COVID-19 virus (12/17/2021)      Dementia without behavioral disturbance ([facility] [address] (12/20/2021)      Acute respiratory failure with hypoxia ([facility] [address] (12/20/2021)        Hospital Course:    Dorita Dave is a 25-year-old female with a history of dementia, hypertension, hypothyroidism, and hyperlipidemia from [place] [address] that presented to the emergency room on 12/17/2021 for a 4-day history of cough. She was exposed to Covid. Rapid covid positive. Chest x-ray 12/17 shows signs concerning for pneumonia. Patient admitted for further Covid 19 work-up. Significant blood work for hypokalemia and creatinine of 1.53. Patient was placed on IV Decadron and azithromycin. Patient found to have elevated pro-Jose and LD. Pulmonology consulted. Elevated CRP. D-dimer normal. ABG 7.44/29/61 on 12/19 on nasal canula. Chest x-ray from 12/20 showing worsening bilateral airspace disease compatible with multilobar pneumonia and/or edema. Remdesivir started on 12/20/2021.12/17 blood: staphylococcus, coagulase negative will start on vancomycin. CXR 12/22 demonstrates resolution of the airspace disease previously  demonstrated within the lungs. There is a milder degree residual groundglass  opacity and interstitial disease. Consider ID consult tomorrow. ABg 7.45/33/24. Repeat d-dimer is . 33.  Speech recommending diet downgrade to minced and moist 5 with mildly thick liquids with 1:1 assistance with ALL PO intake. Will consult ID. Acute hypoxic respiratory failure secondary to Covid pneumonia - superimposed aspiration PNA? LD is decreasing while CRP is increasing   On vancomycin, remdesivir, IV azithromycin, vitamin C, vitamin D, and zinc  Will increase  IV Decadron from 4mg to 6mg q8hrs  Patient with increasing oxygen demand, now on high flow  Tessalon, cough syrup, and lozenges as needed  12/17 blood: staphylococcus, coagulase negative, final  UA with culture: pending  One-to-one sitter  Speech therapy consulted  Pulmonology following  ID consulted    Hypertension  Continue norvasc  Hydralazine PRN    Hypothyroidism  Continue with Synthroid    Dementia without behavioral disturbances  Continue Remeron    DVT Prophylaxis: lovenox  GI Prophylaxis: protonix  Discharge and disposition barriers: >48 hr, remdesivir, IV decadron, antibiotics, increasing O2 demands    Alvarezalise Bassett, daughter Booker Gruber discussed with: Patient/Family, Nurse and     Total time spent with patient: 35 minutes.

## 2021-12-24 NOTE — PROGRESS NOTES
PHYSICAL THERAPY TREATMENT  Patient: Donald Shelton (68 y.o. female)  Date: 12/24/2021  Diagnosis: Pneumonia due to COVID-19 virus [U07.1, J12.82]  Hypoxia [R09.02] <principal problem not specified>       Precautions:    Chart, physical therapy assessment, plan of care and goals were reviewed. ASSESSMENT  Patient continues with skilled PT services and is minimally progressing towards goals. Patient now on high flow O2 so limited with distance during OOB mobility. Pt was CGA rolling, min A supine>sit and min A transfers to standing and bed>chair. Pt amb approx 3 ft with RW and no LOB noted but very fatigued with unsteady gait and observed SOB. Pt spO2 89% with activity rising back up to mid 90s quickly with rest in the chair. Patient very [address] therefore limited communication however pt pleasantly agreeable and cooperative during tx. Pt set up in recliner with breakfast, will continue to progress towards mobility goals. As O2 improves, rec d/c to [facility] with HHPT if enough assistance is provided. Current Level of Function Impacting Discharge (mobility/balance): weakness, SOB    Other factors to consider for discharge: assistance provided at [facility], DME         PLAN :  Patient continues to benefit from skilled intervention to address the above impairments. Continue treatment per established plan of care. to address goals. Recommendation for discharge: (in order for the patient to meet his/her long term goals)  Back to [facility] with HHPT    This discharge recommendation:  Has been made in collaboration with the attending provider and/or case management    IF patient discharges home will need the following DME: to be determined (TBD)       SUBJECTIVE:   Patient stated I am tired.     OBJECTIVE DATA SUMMARY:   Critical Behavior:  Neurologic State: Alert  Orientation Level: Oriented to person  Cognition: Impaired decision making     Functional Mobility Training:  Bed Mobility:  Rolling: Contact guard assistance  Supine to Sit: Minimum assistance     Scooting: Minimum assistance        Transfers:  Sit to Stand: Minimum assistance  Stand to Sit: Minimum assistance        Balance:  Sitting: Intact; With support  Sitting - Static: Good (unsupported)  Sitting - Dynamic: Fair (occasional)  Standing: Impaired; With support  Standing - Static: Constant support; Fair  Standing - Dynamic : Constant support;Poor     Ambulation/Gait Training:  Distance (ft): 3 Feet (ft)  Assistive Device: Gait belt;Walker, rolling  Ambulation - Level of Assistance: Minimal assistance  Speed/Xiomy: Slow;Shuffled  Step Length: Right shortened;Left shortened        Pain Ratin/10    Activity Tolerance:   Fair, desaturates with exertion and requires oxygen, requires rest breaks and observed SOB with activity  Please refer to the flowsheet for vital signs taken during this treatment. After treatment patient left in no apparent distress:   Sitting in chair, Call bell within reach and with breakfast    COMMUNICATION/COLLABORATION:   The patients plan of care was discussed with: Registered nurse. Josi Grubbs   Time Calculation: 27 mins         Problem: Mobility Impaired (Adult and Pediatric)  Goal: *Acute Goals and Plan of Care (Insert Text)  Description: Patient will move from supine to sit and sit to supine , scoot up and down, and roll side to side in bed with independence within 7 day(s). Patient will transfer from bed to chair and chair to bed with modified independence using the least restrictive device within 7 day(s). Patient will improve static standing balance to modified independence within 1 week(s). Patient will ambulate 100 feet with modified independence with least restrictive device within 1 weeks.        Outcome: Progressing Towards Goal

## 2021-12-24 NOTE — PROGRESS NOTES
Pulmonology and Critical Care Progress Note    Subjective:     Chief Complaint:   Chief Complaint   Patient presents with    Cough          Patient seen and examined  Overnight as noted    Lying in bed comfortably   awake and alert  In significant distress feeling unwell mildly tachypneic  now on high flow oxygen 60 L 90% since yesterday    ABG 12/24 showed pH of 7.48, PCO2 29, PO2 55, bicarb 24, saturation 91%    Chest x-ray  reviewed and shows worsening bilateral infiltrates      Review of Systems:  Review of systems not obtained due to patient factors.     Current Facility-Administered Medications   Medication Dose Route Frequency Provider Last Rate Last Admin    vancomycin (VANCOCIN) 750 mg in 0.9% sodium chloride 250 mL (VIAL-MATE)  750 mg IntraVENous Q24H Shaka Prince  mL/hr at 12/24/21 1232 750 mg at 12/24/21 1232    [START ON 12/26/2021] Vancomycin trough level 12/26 at 10:00   Other ONCE Shaka Prince MD        dexamethasone (DECADRON) 4 mg/mL injection 6 mg  6 mg IntraVENous Q8H Angela Amezquita PA   6 mg at 12/24/21 1318    VANCOMYCIN INFORMATION NOTE   Other Rx Dosing/Monitoring Sotero Prince MD        benzocaine-menthoL (CEPACOL) lozenge 1 Lozenge  1 Lozenge Mucous Membrane PRN Angela Amezquita PA        cetirizine (ZYRTEC) tablet 5 mg  5 mg Oral DAILY PRN Ab Cappsma        remdesivir 100 mg in 0.9% sodium chloride 250 mL IVPB  100 mg IntraVENous Q24H Corona Abraham  mL/hr at 12/24/21 1543 100 mg at 12/24/21 1543    benzonatate (TESSALON) capsule 200 mg  200 mg Oral TID Gia Gore PA-C   200 mg at 12/24/21 1543    guaiFENesin-codeine (ROBITUSSIN AC) 100-10 mg/5 mL solution 5 mL  5 mL Oral Q6H PRN Ana Laura Galindo PA-C   5 mL at 12/22/21 0723    amLODIPine (NORVASC) tablet 5 mg  5 mg Oral DAILY Ana Laura Galindo PA-C   5 mg at 12/24/21 0818    zinc oxide-white petrolatum 17-57 % topical paste   Topical TID Gia Gore PA-C   Given at 12/24/21 0900  sodium chloride (NS) flush 5-40 mL  5-40 mL IntraVENous Q8H Jermaine Hurtado MD   10 mL at 12/24/21 1318    sodium chloride (NS) flush 5-40 mL  5-40 mL IntraVENous PRN Jermaine Hurtado MD        acetaminophen (TYLENOL) tablet 650 mg  650 mg Oral Q6H PRN Jermaine Hurtado MD   650 mg at 12/23/21 2233    Or    acetaminophen (TYLENOL) suppository 650 mg  650 mg Rectal Q6H PRN Jermaine Hurtado MD        ondansetron (ZOFRAN ODT) tablet 4 mg  4 mg Oral Q6H PRN Jermaine Hurtado MD        Or    ondansetron (ZOFRAN) injection 4 mg  4 mg IntraVENous Q6H PRN Jermaine Hurtado MD        enoxaparin (LOVENOX) injection 30 mg  30 mg SubCUTAneous Q24H Jermaine Hurtado MD   30 mg at 12/24/21 0505    cholecalciferol (VITAMIN D3) (1000 Units /25 mcg) tablet 2,000 Units  2,000 Units Oral DAILY Jermaine Hurtado MD   2,000 Units at 12/24/21 0818    aspirin delayed-release tablet 81 mg  81 mg Oral DAILY Jermaine Hurtado MD   81 mg at 12/24/21 0818    pantoprazole (PROTONIX) tablet 40 mg  40 mg Oral SHAYLA Hurtado MD   40 mg at 12/24/21 0818    clopidogreL (PLAVIX) tablet 75 mg  75 mg Oral DAILY Jermaine Hurtado MD   75 mg at 12/24/21 0818    mirtazapine (REMERON) tablet 15 mg  15 mg Oral QHS Jermaine Hurtado MD   15 mg at 12/23/21 2233    melatonin tablet 10 mg  10 mg Oral QHS Jermaine Hurtado MD   10 mg at 12/23/21 2233    levothyroxine (SYNTHROID) tablet 75 mcg  75 mcg Oral ACLUCAS Hurtado MD   75 mcg at 12/24/21 0818    zinc sulfate (ZINCATE) 50 mg zinc (220 mg) capsule 1 Capsule  1 Capsule Oral DAILY Ana Laura Galindo PA-C   1 Capsule at 12/24/21 0818    ascorbic acid (vitamin C) (VITAMIN C) tablet 250 mg  250 mg Oral BID Ana Laura Galindo PA-C   250 mg at 12/24/21 0818    hydrALAZINE (APRESOLINE) 20 mg/mL injection 20 mg  20 mg IntraVENous Q6H PRN Ana Laura Galindo PA-C                No Known Allergies        Objective: Blood pressure 115/65, pulse 72, temperature 97.7 °F (36.5 °C), resp. rate 17, height 5' (1.524 m), weight 61.2 kg (135 lb), SpO2 97 %. Temp (24hrs), Av.7 °F (36.5 °C), Min:96.9 °F (36.1 °C), Max:98.1 °F (36.7 °C)      Intake and Output:  Current Shift: 701 - 1900  In: -   Out: 300 [Urine:300]  Last 3 Shifts: 1901 - 700  In: -   Out: 715 [Urine:475]    Physical Exam:    General: Lying in bed comfortably, no acute distress  Eye: Reactive, symmetric  Throat and Neck: Supple  Lung: Reduced air entry bilaterally with prolonged exhalation but no wheezing. Occasional crackles. Heart: S1+S2. No murmurs  Abdomen: soft, non-tender. Bowel sounds normal. No masses; obese  Extremities: No edema  : Not done  Skin: No cyanosis  Neurologic: A & O x3. Grossly nonfocal  Psychiatric: Appropriate affect; coherent    Lab/Data Review:  Recent Results (from the past 24 hour(s))   COVID-19 RAPID TEST    Collection Time: 21 12:35 PM   Result Value Ref Range    Specimen source Nasopharyngeal      COVID-19 rapid test DETECTED (A) Not Detected     BLOOD GAS, ARTERIAL    Collection Time: 21  1:45 PM   Result Value Ref Range    pH 7.48 (H) 7.35 - 7.45      PCO2 29 (L) 35 - 45 mmHg    PO2 55 (L) 75 - 100 mmHg    O2 SAT 91 (L) >95 %    BICARBONATE 24 22 - 26 mmol/L    BASE DEFICIT 0.8 0 - 2 mmol/L    O2 METHOD High Flow O2      O2 FLOW RATE 60.0 L/min    FIO2 90.0 %    SITE Right Radial      ESTEPHANIE'S TEST PASS       chest X-ray      XR CHEST PORT   Final Result   1. Moderate diffuse bilateral airspace opacities, increased from the previous   exam.   2. Trace bilateral pleural effusions. XR CHEST PORT   Final Result   1. There has been are resolution of the airspace disease previously   demonstrated within the lungs. There is a milder degree residual groundglass   opacity and interstitial disease.  The differential diagnosis would include   partially resolved interstitial and alveolar edema versus partially resolved   pneumonitis/aspiration pneumonia. I favor edema given the rapid interval   decrease in the airspace process. 2.  The patient is status post a previous transfemoral aortic valve replacement. XR CHEST PORT   Final Result   Worsening bilateral airspace disease compatible with multi lobar pneumonia   and/or edema in the appropriate clinical setting. XR CHEST PORT   Final Result   Subtle bilateral pulmonary parenchymal opacities. Please see above. CT Results  (Last 48 hours)    None          Assessment:     1. Acute respiratory failure with hypoxia  2. COVID-19 pneumonia  3. History of dementia  4. Hypertension  5. Hypothyroidism  6. Dyslipidemia    Plan:     Patient admitted to the hospital  Will be watched here closely    On high flow oxygen 60 L 90% FiO2  Continue oxygen supplementation as needed to keep saturation above 92%    Chest x-ray reviewed  Patient is COVID-19 positive status  Continue dexamethasone  Back on oxygen with worsening bilateral infiltrates on chest x-ray  Remdesivir started 12/20/2021  Last D-dimer 12/23 was 0.3      Chest x-ray  Give Lasix 40 mg IV x1  Already on vancomycin  Zosyn for broader antibiotic coverage    Continue vitamin C and zinc  Tessalon Perles    Continue blood pressure medications  Continue Synthroid  Continue Remeron    DVT and GI prophylaxis    Worsening clinical status  Condition tenuous  Prognosis guarded  If declines will require BiPAP or intubation    Questions of patient were answered at bedside in detail  Case discussed in detail with RN, RT, and care team    Thank you for involving me in the care of this patient  I will follow with you closely during hospitalization    Time spent more than 50 minutes in direct patient care with no overlap reviewing results and records, decision-making, and answering questions.       Bal Barclay MD  [facility]  12/24/2021  1:04 PM

## 2021-12-24 NOTE — PROGRESS NOTES
Hospitalist Progress Note    Subjective:   Daily Progress Note: 12/24/2021 10:02 AM    Patient examined with 1:1 sitter at bedside quietly resting. No concerns voiced from staff at bedside. Patient remains on high flow 60L 90%. No acute distress noted on examination.     Current Facility-Administered Medications   Medication Dose Route Frequency    vancomycin (VANCOCIN) 750 mg in 0.9% sodium chloride 250 mL (VIAL-MATE)  750 mg IntraVENous Q24H    [START ON 12/26/2021] Vancomycin trough level 12/26 at 10:00   Other ONCE    dexamethasone (DECADRON) 4 mg/mL injection 6 mg  6 mg IntraVENous Q8H    VANCOMYCIN INFORMATION NOTE   Other Rx Dosing/Monitoring    benzocaine-menthoL (CEPACOL) lozenge 1 Lozenge  1 Lozenge Mucous Membrane PRN    cetirizine (ZYRTEC) tablet 5 mg  5 mg Oral DAILY PRN    remdesivir 100 mg in 0.9% sodium chloride 250 mL IVPB  100 mg IntraVENous Q24H    benzonatate (TESSALON) capsule 200 mg  200 mg Oral TID    guaiFENesin-codeine (ROBITUSSIN AC) 100-10 mg/5 mL solution 5 mL  5 mL Oral Q6H PRN    amLODIPine (NORVASC) tablet 5 mg  5 mg Oral DAILY    zinc oxide-white petrolatum 17-57 % topical paste   Topical TID    sodium chloride (NS) flush 5-40 mL  5-40 mL IntraVENous Q8H    sodium chloride (NS) flush 5-40 mL  5-40 mL IntraVENous PRN    acetaminophen (TYLENOL) tablet 650 mg  650 mg Oral Q6H PRN    Or    acetaminophen (TYLENOL) suppository 650 mg  650 mg Rectal Q6H PRN    ondansetron (ZOFRAN ODT) tablet 4 mg  4 mg Oral Q6H PRN    Or    ondansetron (ZOFRAN) injection 4 mg  4 mg IntraVENous Q6H PRN    enoxaparin (LOVENOX) injection 30 mg  30 mg SubCUTAneous Q24H    cholecalciferol (VITAMIN D3) (1000 Units /25 mcg) tablet 2,000 Units  2,000 Units Oral DAILY    aspirin delayed-release tablet 81 mg  81 mg Oral DAILY    pantoprazole (PROTONIX) tablet 40 mg  40 mg Oral ACB    clopidogreL (PLAVIX) tablet 75 mg  75 mg Oral DAILY    mirtazapine (REMERON) tablet 15 mg  15 mg Oral QHS    melatonin tablet 10 mg  10 mg Oral QHS    levothyroxine (SYNTHROID) tablet 75 mcg  75 mcg Oral ACB    zinc sulfate (ZINCATE) 50 mg zinc (220 mg) capsule 1 Capsule  1 Capsule Oral DAILY    ascorbic acid (vitamin C) (VITAMIN C) tablet 250 mg  250 mg Oral BID    hydrALAZINE (APRESOLINE) 20 mg/mL injection 20 mg  20 mg IntraVENous Q6H PRN        Review of Systems  Review of Systems   Unable to perform ROS: Other   Constitutional: Negative. HENT:        [place]   Respiratory: Positive for cough. Negative for hemoptysis, shortness of breath and wheezing. Cardiovascular: Negative. Gastrointestinal: Negative. All other systems reviewed and are negative. Objective:     Visit Vitals  /74 (BP 1 Location: Left upper arm)   Pulse 78   Temp 97.8 °F (36.6 °C)   Resp 18   Ht 5' (1.524 m)   Wt 61.2 kg (135 lb)   SpO2 92%   BMI 26.37 kg/m²    O2 Flow Rate (L/min): 60 l/min O2 Device: Heated,Hi flow nasal cannula    Temp (24hrs), Av.7 °F (36.5 °C), Min:96.9 °F (36.1 °C), Max:98.1 °F (36.7 °C)      701 - 1900  In: -   Out: 157 [YXCJR:070]  1901 - 700  In: -   Out: 759 [Urine:475]    Recent Results (from the past 24 hour(s))   D DIMER    Collection Time: 21  1:40 PM   Result Value Ref Range    D DIMER 0.34 <0.50 ug/ml(FEU)        XR CHEST PORT   Final Result   1. Moderate diffuse bilateral airspace opacities, increased from the previous   exam.   2. Trace bilateral pleural effusions. XR CHEST PORT   Final Result   1. There has been are resolution of the airspace disease previously   demonstrated within the lungs. There is a milder degree residual groundglass   opacity and interstitial disease. The differential diagnosis would include   partially resolved interstitial and alveolar edema versus partially resolved   pneumonitis/aspiration pneumonia. I favor edema given the rapid interval   decrease in the airspace process.    2.  The patient is status post a previous transfemoral aortic valve replacement. XR CHEST PORT   Final Result   Worsening bilateral airspace disease compatible with multi lobar pneumonia   and/or edema in the appropriate clinical setting. XR CHEST PORT   Final Result   Subtle bilateral pulmonary parenchymal opacities. Please see above. PHYSICAL EXAM:    Physical Exam  Vitals and nursing note reviewed. HENT:      Head: Normocephalic and atraumatic. Ears:      Comments: [place] hearing aides present bilaterally  Cardiovascular:      Rate and Rhythm: Normal rate and regular rhythm. Pulmonary:      Effort: Respiratory distress present. Breath sounds: No wheezing. Comments: High flow 60L 90%  Musculoskeletal:      Right lower leg: No edema. Left lower leg: No edema. Skin:     General: Skin is warm. Capillary Refill: Capillary refill takes less than 2 seconds. Neurological:      Mental Status: She is alert. Data Review    Recent Results (from the past 24 hour(s))   D DIMER    Collection Time: 12/23/21  1:40 PM   Result Value Ref Range    D DIMER 0.34 <0.50 ug/ml(FEU)        Assessment/Plan:     Active Problems:    Essential hypertension (8/22/2017)      Acquired hypothyroidism (8/22/2017)      Hypoxia (12/17/2021)      Pneumonia due to COVID-19 virus (12/17/2021)      Dementia without behavioral disturbance ([facility] [address] (12/20/2021)      Acute respiratory failure with hypoxia ([address] (12/20/2021)        Hospital Course:    Mery Higgins is a 61-year-old female with a history of dementia, hypertension, hypothyroidism, and hyperlipidemia from [place] [address] that presented to the emergency room on 12/17/2021 for a 4-day history of cough. She was exposed to Covid. Rapid covid positive. Chest x-ray 12/17 shows signs concerning for pneumonia. Patient admitted for further Covid 19 work-up. Significant blood work for hypokalemia and creatinine of 1.53. Patient was placed on IV Decadron and azithromycin.  Patient found to have elevated pro-Jose and LD. Pulmonology consulted. Elevated CRP. D-dimer normal. ABG 7.44/29/61 on 12/19 on nasal canula. Chest x-ray from 12/20 showing worsening bilateral airspace disease compatible with multilobar pneumonia and/or edema. Remdesivir started on 12/20/2021.12/17 blood: staphylococcus, coagulase negative will start on vancomycin. CXR 12/22 demonstrates resolution of the airspace disease previouslydemonstrated within the lungs. There is a milder degree residual ground glass opacity and interstitial disease. Consider ID consult tomorrow. ABg 7.45/33/24. Repeat d-dimer is . 33. Speech recommending diet downgrade to minced and moist 5 with mildly thick liquids with 1:1 assistance with ALL PO intake. Repeat cxr shows bilateral diffuse airspace opacities. Acute hypoxic respiratory failure secondary to Covid pneumonia   Superimposed aspiration PNA? LD is decreasing while CRP is increasing   On vancomycin, remdesivir, IV azithromycin, vitamin C, vitamin D, and zinc  IV Decadron from 6mg q8hrs  Patient with increasing oxygen demand, now on high flow  Tessalon, cough syrup, and lozenges as needed  12/17 blood: staphylococcus, coagulase negative, final  One-to-one sitter for safety  Speech therapy following  Pulmonology following  Obtain repeat ABG    Hypertension  BP remains at goal  Continue norvasc  Hydralazine PRN    Hypothyroidism  Continue with Synthroid    Dementia:  Continue Remeron  Continue 1:1 sitter for safety  UA unremarkable    DVT Prophylaxis: lovenox  GI Prophylaxis: protonix  Discharge and disposition barriers:   >48 hr  Continue remdesivir, IV decadron, antibiotics  Obtain repeat ABGs    Radha Lawrence, daughter Booker Gruber discussed with: Patient     Total time spent with patient: 35 minutes.

## 2021-12-25 NOTE — PROGRESS NOTES
Pulmonology and Critical Care Progress Note    Subjective:     Chief Complaint:   Chief Complaint   Patient presents with    Cough          Patient seen and examined  Overnight as noted    Lying in bed comfortably  Lethargic but arouses easily  In significant distress feeling unwell mildly tachypneic  now on high flow oxygen 60 L 100%  ABGs and chest x-ray reviewed    Chest x-ray  reviewed and shows worsening bilateral infiltrates with small pleural effusions      Review of Systems:  Review of systems not obtained due to patient factors.     Current Facility-Administered Medications   Medication Dose Route Frequency Provider Last Rate Last Admin    TPN ADULT-PERIPHERAL AA 4.25% D5% + ELECTROLYTES   IntraVENous CONTINUOUS Violeta Turner NP        fat emulsion 20% (LIPOSYN, INTRAlipid) infusion 250 mL  250 mL IntraVENous CONTINUOUS Violeta Turner NP        piperacillin-tazobactam (ZOSYN) 3.375 g in 0.9% sodium chloride (MBP/ADV) 100 mL MBP  3.375 g IntraVENous Q8H Corona Abraham MD 25 mL/hr at 12/25/21 0934 3.375 g at 12/25/21 0934    vancomycin (VANCOCIN) 750 mg in 0.9% sodium chloride 250 mL (VIAL-MATE)  750 mg IntraVENous Q24H Shaka Prince  mL/hr at 12/25/21 1323 750 mg at 12/25/21 1323    [START ON 12/26/2021] Vancomycin trough level 12/26 at 10:00   Other ONCE Shaka Prince MD        dexamethasone (DECADRON) 4 mg/mL injection 6 mg  6 mg IntraVENous Q8H Angela Amezquita, PA   6 mg at 12/25/21 1323    VANCOMYCIN INFORMATION NOTE   Other Rx Dosing/Monitoring Yang Prince MD        benzocaine-menthoL (CEPACOL) lozenge 1 Lozenge  1 Lozenge Mucous Membrane PRN Angela Amezquita, PA        cetirizine (ZYRTEC) tablet 5 mg  5 mg Oral DAILY PRN Valentino Aldrich, [place]        remdesivir 100 mg in 0.9% sodium chloride 250 mL IVPB  100 mg IntraVENous Q24H Corona Abraham  mL/hr at 12/24/21 1543 100 mg at 12/24/21 1543    benzonatate (TESSALON) capsule 200 mg  200 mg Oral TID Cesar Ohara REBECCA   200 mg at 12/25/21 0934    guaiFENesin-codeine (ROBITUSSIN AC) 100-10 mg/5 mL solution 5 mL  5 mL Oral Q6H PRN Jose De Jesus Galindo PA-C   5 mL at 12/22/21 0723    amLODIPine (NORVASC) tablet 5 mg  5 mg Oral DAILY Ana Laura Galindo PA-C   5 mg at 12/25/21 2714    zinc oxide-white petrolatum 17-57 % topical paste   Topical TID China Cortez PA-C   Given at 12/25/21 0900    sodium chloride (NS) flush 5-40 mL  5-40 mL IntraVENous Q8H Kavita Rose MD   10 mL at 12/25/21 1324    sodium chloride (NS) flush 5-40 mL  5-40 mL IntraVENous PRN Kavita Rose MD        acetaminophen (TYLENOL) tablet 650 mg  650 mg Oral Q6H PRN Kavita Rose MD   650 mg at 12/23/21 2233    Or    acetaminophen (TYLENOL) suppository 650 mg  650 mg Rectal Q6H PRN Kavita Rose MD        ondansetron (ZOFRAN ODT) tablet 4 mg  4 mg Oral Q6H PRN Kavita Rose MD        Or    ondansetron (ZOFRAN) injection 4 mg  4 mg IntraVENous Q6H PRN Kavita Rose MD        enoxaparin (LOVENOX) injection 30 mg  30 mg SubCUTAneous Q24H Kavita Rose MD   30 mg at 12/25/21 0523    cholecalciferol (VITAMIN D3) (1000 Units /25 mcg) tablet 2,000 Units  2,000 Units Oral DAILY Kavita Rose MD   2,000 Units at 12/25/21 0934    aspirin delayed-release tablet 81 mg  81 mg Oral DAILY Kavita Rose MD   81 mg at 12/25/21 0934    pantoprazole (PROTONIX) tablet 40 mg  40 mg Oral ACB Kavita Rose MD   40 mg at 12/25/21 0934    clopidogreL (PLAVIX) tablet 75 mg  75 mg Oral DAILY Kavita Rose MD   75 mg at 12/25/21 0934    mirtazapine (REMERON) tablet 15 mg  15 mg Oral QHS Kavita Rose MD   15 mg at 12/24/21 2138    melatonin tablet 10 mg  10 mg Oral QHS Kavita Rose MD   10 mg at 12/24/21 2138    levothyroxine (SYNTHROID) tablet 75 mcg  75 mcg Oral ACB Kavita Rose MD   75 mcg at 12/25/21 0934    zinc sulfate (ZINCATE) 50 mg zinc (220 mg) capsule 1 Capsule  1 Capsule Oral DAILY Ana Laura GalindoREBECCA   1 Capsule at 21 0934    ascorbic acid (vitamin C) (VITAMIN C) tablet 250 mg  250 mg Oral BID Valentina Galindo Allison REBECCA   250 mg at 21 [id number]    hydrALAZINE (APRESOLINE) 20 mg/mL injection 20 mg  20 mg IntraVENous Q6H PRN Ana Laura GalindoREBECCA                No Known Allergies        Objective:     Blood pressure (!) 147/74, pulse 75, temperature 98.8 °F (37.1 °C), resp. rate 22, height 5' (1.524 m), weight 61.2 kg (135 lb), SpO2 (!) 89 %. Temp (24hrs), Av.1 °F (36.7 °C), Min:97.7 °F (36.5 °C), Max:98.8 °F (37.1 °C)      Intake and Output:  Current Shift: No intake/output data recorded. Last 3 Shifts:  1901 -  0700  In: 600 [P.O.:600]  Out: 775 [Urine:775]    Physical Exam:    General: Lying in bed comfortably, no acute distress  Eye: Reactive, symmetric  Throat and Neck: Supple  Lung: Reduced air entry bilaterally with prolonged exhalation but no wheezing. Occasional crackles. Heart: S1+S2. No murmurs  Abdomen: soft, non-tender. Bowel sounds normal. No masses; obese  Extremities: No edema  : Not done  Skin: No cyanosis  Neurologic: A & O x3. Grossly nonfocal  Psychiatric: Appropriate affect; coherent    Lab/Data Review:  No results found for this or any previous visit (from the past 24 hour(s)). chest X-ray      XR CHEST PORT   Final Result   1. Moderate diffuse bilateral airspace opacities, increased from the previous   exam.   2. Trace bilateral pleural effusions. XR CHEST PORT   Final Result   1. There has been are resolution of the airspace disease previously   demonstrated within the lungs. There is a milder degree residual groundglass   opacity and interstitial disease. The differential diagnosis would include   partially resolved interstitial and alveolar edema versus partially resolved   pneumonitis/aspiration pneumonia. I favor edema given the rapid interval   decrease in the airspace process.    2. The patient is status post a previous transfemoral aortic valve replacement. XR CHEST PORT   Final Result   Worsening bilateral airspace disease compatible with multi lobar pneumonia   and/or edema in the appropriate clinical setting. XR CHEST PORT   Final Result   Subtle bilateral pulmonary parenchymal opacities. Please see above. CT Results  (Last 48 hours)    None          Assessment:     1. Acute respiratory failure with hypoxia  2. COVID-19 pneumonia  3. History of dementia  4. Hypertension  5. Hypothyroidism  6. Dyslipidemia    Plan:     Patient admitted to the hospital  Will be watched here closely    Gradual decline in clinical status with increasing weakness  On high flow oxygen 60 L 100% FiO2  Continue oxygen supplementation as needed to keep saturation above 92%    Chest x-ray reviewed  Patient is COVID-19 positive status  Continue dexamethasone  Back on oxygen with worsening bilateral infiltrates on chest x-ray  Remdesivir started 12/20/2021  Last D-dimer 12/23 was 0.3    Chest x-ray  Give Lasix 40 mg IV x1  Already on vancomycin  Zosyn for broader antibiotic coverage    Continue vitamin C and zinc  Tessalon Perles    Starting TPN by primary service. Continue blood pressure medications  Continue Synthroid  Continue Remeron    DVT and GI prophylaxis    Worsening clinical status  Condition tenuous  Prognosis guarded  If declines will require BiPAP or intubation    Questions of patient were answered at bedside in detail  Case discussed in detail with RN, RT, and care team    Thank you for involving me in the care of this patient  I will follow with you closely during hospitalization    Time spent more than 50 minutes in direct patient care with no overlap reviewing results and records, decision-making, and answering questions.       Brenda Haji MD  [facility]  12/25/2021  1:04 PM

## 2021-12-25 NOTE — PROGRESS NOTES
Hospitalist Progress Note    Subjective:   Daily Progress Note: 12/25/2021 10:02 AM    Patient examined with 1:1 sitter at bedside quietly resting, appears ill and lethargic. Staff reports poor oral intake and that patient has become to weak to eat. Patient remains on high flow 60L 100%.       Current Facility-Administered Medications   Medication Dose Route Frequency    piperacillin-tazobactam (ZOSYN) 3.375 g in 0.9% sodium chloride (MBP/ADV) 100 mL MBP  3.375 g IntraVENous Q8H    vancomycin (VANCOCIN) 750 mg in 0.9% sodium chloride 250 mL (VIAL-MATE)  750 mg IntraVENous Q24H    [START ON 12/26/2021] Vancomycin trough level 12/26 at 10:00   Other ONCE    dexamethasone (DECADRON) 4 mg/mL injection 6 mg  6 mg IntraVENous Q8H    VANCOMYCIN INFORMATION NOTE   Other Rx Dosing/Monitoring    benzocaine-menthoL (CEPACOL) lozenge 1 Lozenge  1 Lozenge Mucous Membrane PRN    cetirizine (ZYRTEC) tablet 5 mg  5 mg Oral DAILY PRN    remdesivir 100 mg in 0.9% sodium chloride 250 mL IVPB  100 mg IntraVENous Q24H    benzonatate (TESSALON) capsule 200 mg  200 mg Oral TID    guaiFENesin-codeine (ROBITUSSIN AC) 100-10 mg/5 mL solution 5 mL  5 mL Oral Q6H PRN    amLODIPine (NORVASC) tablet 5 mg  5 mg Oral DAILY    zinc oxide-white petrolatum 17-57 % topical paste   Topical TID    sodium chloride (NS) flush 5-40 mL  5-40 mL IntraVENous Q8H    sodium chloride (NS) flush 5-40 mL  5-40 mL IntraVENous PRN    acetaminophen (TYLENOL) tablet 650 mg  650 mg Oral Q6H PRN    Or    acetaminophen (TYLENOL) suppository 650 mg  650 mg Rectal Q6H PRN    ondansetron (ZOFRAN ODT) tablet 4 mg  4 mg Oral Q6H PRN    Or    ondansetron (ZOFRAN) injection 4 mg  4 mg IntraVENous Q6H PRN    enoxaparin (LOVENOX) injection 30 mg  30 mg SubCUTAneous Q24H    cholecalciferol (VITAMIN D3) (1000 Units /25 mcg) tablet 2,000 Units  2,000 Units Oral DAILY    aspirin delayed-release tablet 81 mg  81 mg Oral DAILY    pantoprazole (PROTONIX) tablet 40 mg  40 mg Oral ACB    clopidogreL (PLAVIX) tablet 75 mg  75 mg Oral DAILY    mirtazapine (REMERON) tablet 15 mg  15 mg Oral QHS    melatonin tablet 10 mg  10 mg Oral QHS    levothyroxine (SYNTHROID) tablet 75 mcg  75 mcg Oral ACB    zinc sulfate (ZINCATE) 50 mg zinc (220 mg) capsule 1 Capsule  1 Capsule Oral DAILY    ascorbic acid (vitamin C) (VITAMIN C) tablet 250 mg  250 mg Oral BID    hydrALAZINE (APRESOLINE) 20 mg/mL injection 20 mg  20 mg IntraVENous Q6H PRN        Review of Systems  Review of Systems   Unable to perform ROS: Other (limited due to lethargy and mental status)   Constitutional: Negative. HENT:        [place]   Respiratory: Positive for cough and shortness of breath. All other systems reviewed and are negative. Objective:     Visit Vitals  BP (!) 147/74   Pulse 75   Temp 98.8 °F (37.1 °C)   Resp 22   Ht 5' (1.524 m)   Wt 61.2 kg (135 lb)   SpO2 (!) 89%   BMI 26.37 kg/m²    O2 Flow Rate (L/min): 60 l/min O2 Device: Hi flow nasal cannula    Temp (24hrs), Av.1 °F (36.7 °C), Min:97.7 °F (36.5 °C), Max:98.8 °F (37.1 °C)      No intake/output data recorded.  1901 -  0700  In: 600 [P.O.:600]  Out: 775 [Urine:775]    Recent Results (from the past 24 hour(s))   COVID-19 RAPID TEST    Collection Time: 21 12:35 PM   Result Value Ref Range    Specimen source Nasopharyngeal      COVID-19 rapid test DETECTED (A) Not Detected     BLOOD GAS, ARTERIAL    Collection Time: 21  1:45 PM   Result Value Ref Range    pH 7.48 (H) 7.35 - 7.45      PCO2 29 (L) 35 - 45 mmHg    PO2 55 (L) 75 - 100 mmHg    O2 SAT 91 (L) >95 %    BICARBONATE 24 22 - 26 mmol/L    BASE DEFICIT 0.8 0 - 2 mmol/L    O2 METHOD High Flow O2      O2 FLOW RATE 60.0 L/min    FIO2 90.0 %    SITE Right Radial      ESTEPHANIE'S TEST PASS          XR CHEST PORT   Final Result   1. Moderate diffuse bilateral airspace opacities, increased from the previous   exam.   2. Trace bilateral pleural effusions.       XR CHEST PORT Final Result   1. There has been are resolution of the airspace disease previously   demonstrated within the lungs. There is a milder degree residual groundglass   opacity and interstitial disease. The differential diagnosis would include   partially resolved interstitial and alveolar edema versus partially resolved   pneumonitis/aspiration pneumonia. I favor edema given the rapid interval   decrease in the airspace process. 2.  The patient is status post a previous transfemoral aortic valve replacement. XR CHEST PORT   Final Result   Worsening bilateral airspace disease compatible with multi lobar pneumonia   and/or edema in the appropriate clinical setting. XR CHEST PORT   Final Result   Subtle bilateral pulmonary parenchymal opacities. Please see above. PHYSICAL EXAM:    Physical Exam  Vitals and nursing note reviewed. Constitutional:       Comments: Lethargic, weak   HENT:      Head: Normocephalic and atraumatic. Ears:      Comments: [place] hearing aides present bilaterally  Cardiovascular:      Rate and Rhythm: Normal rate and regular rhythm. Pulmonary:      Effort: Respiratory distress present. Breath sounds: No wheezing. Comments: High flow 60L 100%  Musculoskeletal:      Right lower leg: No edema. Left lower leg: No edema. Skin:     General: Skin is warm. Capillary Refill: Capillary refill takes less than 2 seconds.    Psychiatric:      Comments: Lethargic, weak        Data Review    Recent Results (from the past 24 hour(s))   COVID-19 RAPID TEST    Collection Time: 12/24/21 12:35 PM   Result Value Ref Range    Specimen source Nasopharyngeal      COVID-19 rapid test DETECTED (A) Not Detected     BLOOD GAS, ARTERIAL    Collection Time: 12/24/21  1:45 PM   Result Value Ref Range    pH 7.48 (H) 7.35 - 7.45      PCO2 29 (L) 35 - 45 mmHg    PO2 55 (L) 75 - 100 mmHg    O2 SAT 91 (L) >95 %    BICARBONATE 24 22 - 26 mmol/L    BASE DEFICIT 0.8 0 - 2 mmol/L    O2 METHOD High Flow O2      O2 FLOW RATE 60.0 L/min    FIO2 90.0 %    SITE Right Radial      ESTEPHANIE'S TEST PASS          Assessment/Plan:     Principal Problem:    Pneumonia due to COVID-19 virus (12/17/2021)    Active Problems:    Essential hypertension (8/22/2017)      Acquired hypothyroidism (8/22/2017)      Hypoxia (12/17/2021)      Dementia without behavioral disturbance ([address] (12/20/2021)      Acute respiratory failure with hypoxia ([facility] (12/20/2021)        Hospital Course:    Maribel Arroyo is a 80-year-old female with a history of dementia, hypertension, hypothyroidism, and hyperlipidemia from [address] that presented to the emergency room on 12/17/2021 for a 4-day history of cough. She was exposed to Covid. Rapid covid positive. Chest x-ray 12/17 shows signs concerning for pneumonia. Patient admitted for further Covid 19 work-up. Significant blood work for hypokalemia and creatinine of 1.53. Patient was placed on IV Decadron and azithromycin. Patient found to have elevated pro-Jose and LD. Pulmonology consulted. Elevated CRP. D-dimer normal. ABG 7.44/29/61 on 12/19 on nasal canula. Chest x-ray from 12/20 showing worsening bilateral airspace disease compatible with multilobar pneumonia and/or edema. Remdesivir started on 12/20/2021.12/17 blood: staphylococcus, coagulase negative will start on vancomycin. CXR 12/22 demonstrates resolution of the airspace disease previouslydemonstrated within the lungs. There is a milder degree residual ground glass opacity and interstitial disease. Consider ID consult tomorrow. ABg 7.45/33/24. Repeat d-dimer is . 33. Speech recommending diet downgrade to minced and moist 5 with mildly thick liquids with 1:1 assistance with ALL PO intake. Repeat cxr shows bilateral diffuse airspace opacities. 12/25 jesus flow increased to 60 L 100%. TPN started. Acute hypoxic respiratory failure secondary to Covid pneumonia   Superimposed aspiration PNA?   LD is decreasing while CRP is increasing   On vancomycin, remdesivir, IV azithromycin, vitamin C, vitamin D, and zinc  IV Decadron from 6mg q8hrs  Patient with increasing oxygen demand, now on high flow 60L 100%  Tessalon, cough syrup, and lozenges as needed  12/17 blood: staphylococcus, coagulase negative, final  One-to-one sitter for safety  Speech therapy following  Pulmonology following    Hypertension  BP remains at goal  Continue norvasc  Hydralazine PRN    Hypothyroidism  Continue with Synthroid    Dementia:  Continue Remeron  Continue 1:1 sitter for safety  UA unremarkable    Poor oral intake:  Secondary to covid pneumonia  Will initiate tpn  Obtain RD evaluation      DVT Prophylaxis: lovenox  GI Prophylaxis: protonix  Discharge and disposition barriers:   >48 hr  Continue remdesivir, IV decadron, antibiotics  Initiate Nguyen Heard, daughter [phone]  left    Care Plan discussed with: Patient     Total time spent with patient: 35 minutes.

## 2021-12-26 NOTE — PROGRESS NOTES
Lab states that they were unable to get blood from patient at this time made DOMONIQUE Melton aware new order for Picc line.

## 2021-12-26 NOTE — PROGRESS NOTES
Hospitalist Progress Note    Subjective:   Daily Progress Note: 12/26/2021 10:02 AM    Patient examined with 1:1 sitter at bedside quietly resting, appears ill and lethargic. She is opening her eyes more today. She continues to have poor oral intake. Patient remains on high flow 60L 100%.       Current Facility-Administered Medications   Medication Dose Route Frequency    TPN ADULT-PERIPHERAL AA 4.25% D5% + ELECTROLYTES   IntraVENous CONTINUOUS    piperacillin-tazobactam (ZOSYN) 3.375 g in 0.9% sodium chloride (MBP/ADV) 100 mL MBP  3.375 g IntraVENous Q8H    vancomycin (VANCOCIN) 750 mg in 0.9% sodium chloride 250 mL (VIAL-MATE)  750 mg IntraVENous Q24H    Vancomycin trough level 12/26 at 10:00   Other ONCE    dexamethasone (DECADRON) 4 mg/mL injection 6 mg  6 mg IntraVENous Q8H    VANCOMYCIN INFORMATION NOTE   Other Rx Dosing/Monitoring    benzocaine-menthoL (CEPACOL) lozenge 1 Lozenge  1 Lozenge Mucous Membrane PRN    cetirizine (ZYRTEC) tablet 5 mg  5 mg Oral DAILY PRN    benzonatate (TESSALON) capsule 200 mg  200 mg Oral TID    guaiFENesin-codeine (ROBITUSSIN AC) 100-10 mg/5 mL solution 5 mL  5 mL Oral Q6H PRN    amLODIPine (NORVASC) tablet 5 mg  5 mg Oral DAILY    zinc oxide-white petrolatum 17-57 % topical paste   Topical TID    sodium chloride (NS) flush 5-40 mL  5-40 mL IntraVENous Q8H    sodium chloride (NS) flush 5-40 mL  5-40 mL IntraVENous PRN    acetaminophen (TYLENOL) tablet 650 mg  650 mg Oral Q6H PRN    Or    acetaminophen (TYLENOL) suppository 650 mg  650 mg Rectal Q6H PRN    ondansetron (ZOFRAN ODT) tablet 4 mg  4 mg Oral Q6H PRN    Or    ondansetron (ZOFRAN) injection 4 mg  4 mg IntraVENous Q6H PRN    enoxaparin (LOVENOX) injection 30 mg  30 mg SubCUTAneous Q24H    cholecalciferol (VITAMIN D3) (1000 Units /25 mcg) tablet 2,000 Units  2,000 Units Oral DAILY    aspirin delayed-release tablet 81 mg  81 mg Oral DAILY    pantoprazole (PROTONIX) tablet 40 mg  40 mg Oral ACB    clopidogreL (PLAVIX) tablet 75 mg  75 mg Oral DAILY    mirtazapine (REMERON) tablet 15 mg  15 mg Oral QHS    melatonin tablet 10 mg  10 mg Oral QHS    levothyroxine (SYNTHROID) tablet 75 mcg  75 mcg Oral ACB    zinc sulfate (ZINCATE) 50 mg zinc (220 mg) capsule 1 Capsule  1 Capsule Oral DAILY    ascorbic acid (vitamin C) (VITAMIN C) tablet 250 mg  250 mg Oral BID    hydrALAZINE (APRESOLINE) 20 mg/mL injection 20 mg  20 mg IntraVENous Q6H PRN        Review of Systems  Review of Systems   Unable to perform ROS: Mental status change (limited due to lethargy and mental status)   Constitutional: Negative. HENT:        [place]   Respiratory: Positive for cough and shortness of breath. All other systems reviewed and are negative. Objective:     Visit Vitals  /62 (BP 1 Location: Right lower arm, BP Patient Position: At rest)   Pulse 74   Temp 97.1 °F (36.2 °C)   Resp 24   Ht 5' (1.524 m)   Wt 61.2 kg (135 lb)   SpO2 97%   BMI 26.37 kg/m²    O2 Flow Rate (L/min): 60 l/min O2 Device: Hi flow nasal cannula    Temp (24hrs), Av.4 °F (36.3 °C), Min:96.8 °F (36 °C), Max:98 °F (36.7 °C)      No intake/output data recorded. No intake/output data recorded. Recent Results (from the past 24 hour(s))   CBC WITH AUTOMATED DIFF    Collection Time: 21  5:00 PM   Result Value Ref Range    WBC 16.2 (H) 3.6 - 11.0 K/uL    RBC 3.89 3.80 - 5.20 M/uL    HGB 12.9 11.5 - 16.0 g/dL    HCT 39.3 35.0 - 47.0 %    .0 (H) 80.0 - 99.0 FL    MCH 33.2 26.0 - 34.0 PG    MCHC 32.8 30.0 - 36.5 g/dL    RDW 14.6 (H) 11.5 - 14.5 %    PLATELET 089 (L) 240 - 400 K/uL    MPV 11.8 8.9 - 12.9 FL    NRBC 0.0 0.0  WBC    ABSOLUTE NRBC 0.00 0.00 - 0.01 K/uL    NEUTROPHILS 92 (H) 32 - 75 %    LYMPHOCYTES 2 (L) 12 - 49 %    MONOCYTES 3 (L) 5 - 13 %    EOSINOPHILS 0 0 - 7 %    BASOPHILS 0 0 - 1 %    IMMATURE GRANULOCYTES 3 (H) 0 - 0.5 %    ABS. NEUTROPHILS 14.9 (H) 1.8 - 8.0 K/UL    ABS.  LYMPHOCYTES 0.3 (L) 0.8 - 3.5 K/UL ABS. MONOCYTES 0.5 0.0 - 1.0 K/UL    ABS. EOSINOPHILS 0.0 0.0 - 0.4 K/UL    ABS. BASOPHILS 0.0 0.0 - 0.1 K/UL    ABS. IMM. GRANS. 0.5 (H) 0.00 - 0.04 K/UL    DF Smear Scanned      RBC COMMENTS Macrocytosis  1+       C REACTIVE PROTEIN, QT    Collection Time: 12/25/21  5:00 PM   Result Value Ref Range    C-Reactive protein 3.56 (H) 0.00 - 0.60 mg/dL        XR CHEST PORT   Final Result   1. Moderate diffuse bilateral airspace opacities, increased from the previous   exam.   2. Trace bilateral pleural effusions. XR CHEST PORT   Final Result   1. There has been are resolution of the airspace disease previously   demonstrated within the lungs. There is a milder degree residual groundglass   opacity and interstitial disease. The differential diagnosis would include   partially resolved interstitial and alveolar edema versus partially resolved   pneumonitis/aspiration pneumonia. I favor edema given the rapid interval   decrease in the airspace process. 2.  The patient is status post a previous transfemoral aortic valve replacement. XR CHEST PORT   Final Result   Worsening bilateral airspace disease compatible with multi lobar pneumonia   and/or edema in the appropriate clinical setting. XR CHEST PORT   Final Result   Subtle bilateral pulmonary parenchymal opacities. Please see above. XR CHEST PORT    (Results Pending)        PHYSICAL EXAM:    Physical Exam  Vitals and nursing note reviewed. Constitutional:       Comments: Lethargic, weak   HENT:      Head: Normocephalic and atraumatic. Ears:      Comments: [employer] hearing aides present bilaterally  Cardiovascular:      Rate and Rhythm: Normal rate and regular rhythm. Pulmonary:      Effort: Respiratory distress present. Breath sounds: No wheezing. Comments: High flow 60L 100%  Musculoskeletal:      Right lower leg: No edema. Left lower leg: No edema. Skin:     General: Skin is warm.       Capillary Refill: Capillary refill takes less than 2 seconds. Psychiatric:      Comments: Lethargic, weak        Data Review    Recent Results (from the past 24 hour(s))   CBC WITH AUTOMATED DIFF    Collection Time: 12/25/21  5:00 PM   Result Value Ref Range    WBC 16.2 (H) 3.6 - 11.0 K/uL    RBC 3.89 3.80 - 5.20 M/uL    HGB 12.9 11.5 - 16.0 g/dL    HCT 39.3 35.0 - 47.0 %    .0 (H) 80.0 - 99.0 FL    MCH 33.2 26.0 - 34.0 PG    MCHC 32.8 30.0 - 36.5 g/dL    RDW 14.6 (H) 11.5 - 14.5 %    PLATELET 792 (L) 781 - 400 K/uL    MPV 11.8 8.9 - 12.9 FL    NRBC 0.0 0.0  WBC    ABSOLUTE NRBC 0.00 0.00 - 0.01 K/uL    NEUTROPHILS 92 (H) 32 - 75 %    LYMPHOCYTES 2 (L) 12 - 49 %    MONOCYTES 3 (L) 5 - 13 %    EOSINOPHILS 0 0 - 7 %    BASOPHILS 0 0 - 1 %    IMMATURE GRANULOCYTES 3 (H) 0 - 0.5 %    ABS. NEUTROPHILS 14.9 (H) 1.8 - 8.0 K/UL    ABS. LYMPHOCYTES 0.3 (L) 0.8 - 3.5 K/UL    ABS. MONOCYTES 0.5 0.0 - 1.0 K/UL    ABS. EOSINOPHILS 0.0 0.0 - 0.4 K/UL    ABS. BASOPHILS 0.0 0.0 - 0.1 K/UL    ABS. IMM. GRANS. 0.5 (H) 0.00 - 0.04 K/UL    DF Smear Scanned      RBC COMMENTS Macrocytosis  1+       C REACTIVE PROTEIN, QT    Collection Time: 12/25/21  5:00 PM   Result Value Ref Range    C-Reactive protein 3.56 (H) 0.00 - 0.60 mg/dL        Assessment/Plan:     Principal Problem:    Pneumonia due to COVID-19 virus (12/17/2021)    Active Problems:    Essential hypertension (8/22/2017)      Acquired hypothyroidism (8/22/2017)      Hypoxia (12/17/2021)      Dementia without behavioral disturbance ([facility] (12/20/2021)      Acute respiratory failure with hypoxia ([address] (12/20/2021)        Hospital Course:    Ana Luisa Oviedo is a 55-year-old female with a history of dementia, hypertension, hypothyroidism, and hyperlipidemia from [place] [id number] that presented to the emergency room on 12/17/2021 for a 4-day history of cough. She was exposed to Covid. Rapid covid positive. Chest x-ray 12/17 shows signs concerning for pneumonia. Patient admitted for further Covid 19 work-up. Significant blood work for hypokalemia and creatinine of 1.53. Patient was placed on IV Decadron and azithromycin. Patient found to have elevated pro-Jose and LD. Pulmonology consulted. Elevated CRP. D-dimer normal. ABG 7.44/29/61 on 12/19 on nasal canula. Chest x-ray from 12/20 showing worsening bilateral airspace disease compatible with multilobar pneumonia and/or edema. Remdesivir started on 12/20/2021.12/17 blood: staphylococcus, coagulase negative will start on vancomycin. CXR 12/22 demonstrates resolution of the airspace disease previouslydemonstrated within the lungs. There is a milder degree residual ground glass opacity and interstitial disease. Continue IV vancomycin and zosyn. ABg 7.45/33/24. Repeat d-dimer is . 33. Speech recommending diet downgrade to minced and moist 5 with mildly thick liquids with 1:1 assistance with ALL PO intake. Repeat cxr shows bilateral diffuse airspace opacities. 12/25 jesus flow increased to 60 L 100%. TPN started. Continue vancomycin, Zosyn, vitamin C, vitamin D, zinc, Decadron 6 mg every 8 hours. Acute hypoxic respiratory failure secondary to Covid pneumonia   Superimposed aspiration PNA?   LD is decreasing while CRP is increasing   On vancomycin, zosyn, vitamin C, vitamin D, and zinc  S/p treatment with remdesivir,  IV Decadron from 6mg q8hrs  Patient with increasing oxygen demand, now on high flow 60L 100%  Tessalon, cough syrup, and lozenges as needed  12/17 blood: staphylococcus, coagulase negative, final  One-to-one sitter for safety  Speech therapy following  Pulmonology following    Hypertension  BP remains at goal  Continue norvasc  Hydralazine PRN    Hypothyroidism  Continue with Synthroid    Dementia:  Continue Remeron  Continue 1:1 sitter for safety  UA unremarkable    Poor oral intake:  Secondary to covid pneumonia  Continue tpn  RD consulted      DVT Prophylaxis: lovenox  GI Prophylaxis: protonix  Discharge and disposition barriers:   >72 hr  Continue  IV decadron, antibiotics  Continue tpn    Raisa Leroy, daughter [phone] updated. Daughter is currently requesting a f2f meeting with treatment team and the patient's five children. I've informed her of the patient's current medical condition and deterioration. Code status discussed, Graciela Tinoco reports she can not make that decision alone and wishes to speak with siblings and will make the decision at [address] [place] [address],[address]. Discussed poor oral intake and the initiation of tpn and possibility of requiring interventions if the patient does not improve with PO intake. Graciela Tinoco also wants to know if she can come visit after 10 days. I informed her that it will be high risk as the patient remains on high flow oxygen and concern for aerosol spread of the virus. Overall prognosis remains guarded. Time spent reviewing case with daughter >20 minutes    Care Plan discussed with: Patient     Total time spent with patient: 35 minutes.

## 2021-12-26 NOTE — PROGRESS NOTES
Spoke with [facility] Pharmacist concerning patient Vancomycin  trough, lab unable to obtain at this, [facility] pharmacist states to go ahead and give Vancomycin.

## 2021-12-26 NOTE — PROGRESS NOTES
Pulmonology and Critical Care Progress Note    Subjective:     Chief Complaint:   Chief Complaint   Patient presents with    Cough          Patient seen and examined  Overnight as noted    Lying in bed comfortably  Lethargic but arouses easily  In significant distress feeling unwell mildly tachypneic  now on high flow oxygen 60 L 100%  ABGs and chest x-ray reviewed    Chest x-ray  reviewed and shows worsening bilateral infiltrates with small pleural effusions      Review of Systems:  Review of systems not obtained due to patient factors.     Current Facility-Administered Medications   Medication Dose Route Frequency Provider Last Rate Last Admin    TPN ADULT-PERIPHERAL AA 4.25% D5% + ELECTROLYTES   IntraVENous CONTINUOUS Shani Majano NP        TPN ADULT-PERIPHERAL AA 4.25% D5% + ELECTROLYTES   IntraVENous CONTINUOUS Shani Majano NP 42 mL/hr at 12/25/21 2136 New Bag at 12/25/21 2136    piperacillin-tazobactam (ZOSYN) 3.375 g in 0.9% sodium chloride (MBP/ADV) 100 mL MBP  3.375 g IntraVENous Q8H Corona Abraham MD 25 mL/hr at 12/26/21 0828 3.375 g at 12/26/21 0828    vancomycin (VANCOCIN) 750 mg in 0.9% sodium chloride 250 mL (VIAL-MATE)  750 mg IntraVENous Q24H Shaka Prince  mL/hr at 12/25/21 1323 750 mg at 12/25/21 1323    Vancomycin trough level 12/26 at 10:00   Other ONCE Shaka Prince MD        dexamethasone (DECADRON) 4 mg/mL injection 6 mg  6 mg IntraVENous Q8H Angela Amezquita PA   6 mg at 12/26/21 0512    VANCOMYCIN INFORMATION NOTE   Other Rx Dosing/Monitoring Ary Prince MD        benzocaine-menthoL (CEPACOL) lozenge 1 Lozenge  1 Lozenge Mucous Membrane PRN Angela Amezquita PA        cetirizine (ZYRTEC) tablet 5 mg  5 mg Oral DAILY PRN Trinidad Amezquita PA        benzonatate (TESSALON) capsule 200 mg  200 mg Oral TID Ana Laura Galindo PA-C   200 mg at 12/26/21 0828    guaiFENesin-codeine (ROBITUSSIN AC) 100-10 mg/5 mL solution 5 mL  5 mL Oral Q6H PRN Dougie Jacobo, REBECCA   5 mL at 12/26/21 0512    amLODIPine (NORVASC) tablet 5 mg  5 mg Oral DAILY Ana Laura Galindo PA-C   5 mg at 12/26/21 0830    zinc oxide-white petrolatum 17-57 % topical paste   Topical TID Edinson Nelson PA-C   Given at 12/26/21 0900    sodium chloride (NS) flush 5-40 mL  5-40 mL IntraVENous Q8H Tanya Mcneal MD   10 mL at 12/26/21 0514    sodium chloride (NS) flush 5-40 mL  5-40 mL IntraVENous PRN Tanya Mcneal MD        acetaminophen (TYLENOL) tablet 650 mg  650 mg Oral Q6H PRN Tanya Mcneal MD   650 mg at 12/23/21 2233    Or    acetaminophen (TYLENOL) suppository 650 mg  650 mg Rectal Q6H PRN Tanya Mcneal MD        ondansetron (ZOFRAN ODT) tablet 4 mg  4 mg Oral Q6H PRN Tanya Mcneal MD        Or    ondansetron (ZOFRAN) injection 4 mg  4 mg IntraVENous Q6H PRN Tanya Mcneal MD        enoxaparin (LOVENOX) injection 30 mg  30 mg SubCUTAneous Q24H Tanya Mcneal MD   30 mg at 12/26/21 1589    cholecalciferol (VITAMIN D3) (1000 Units /25 mcg) tablet 2,000 Units  2,000 Units Oral DAILY Tnaya Mcneal MD   2,000 Units at 12/26/21 7587    aspirin delayed-release tablet 81 mg  81 mg Oral DAILY Tanya Mcneal MD   81 mg at 12/26/21 0829    pantoprazole (PROTONIX) tablet 40 mg  40 mg Oral ACB Tanya Mcneal MD   40 mg at 12/26/21 0829    clopidogreL (PLAVIX) tablet 75 mg  75 mg Oral DAILY Tanya Mcneal MD   75 mg at 12/26/21 0828    mirtazapine (REMERON) tablet 15 mg  15 mg Oral QHS Tanya Mcneal MD   15 mg at 12/25/21 2138    melatonin tablet 10 mg  10 mg Oral QHS Tanya Mcneal MD   10 mg at 12/25/21 2138    levothyroxine (SYNTHROID) tablet 75 mcg  75 mcg Oral ACLUCAS Mcneal MD   75 mcg at 12/26/21 0829    zinc sulfate (ZINCATE) 50 mg zinc (220 mg) capsule 1 Capsule  1 Capsule Oral DAILY Ana Laura Galindo PA-C   1 Capsule at 12/26/21 0828    ascorbic acid (vitamin C) (VITAMIN C) tablet 250 mg  250 mg Oral BID Ana Laura Galindo PA-C   250 mg at 21 6197    hydrALAZINE (APRESOLINE) 20 mg/mL injection 20 mg  20 mg IntraVENous Q6H PRN Ana Laura Galindo PA-C                No Known Allergies        Objective:     Blood pressure 115/62, pulse 74, temperature 97.1 °F (36.2 °C), resp. rate 24, height 5' (1.524 m), weight 61.2 kg (135 lb), SpO2 97 %. Temp (24hrs), Av.4 °F (36.3 °C), Min:96.8 °F (36 °C), Max:98 °F (36.7 °C)      Intake and Output:  Current Shift: No intake/output data recorded. Last 3 Shifts: No intake/output data recorded. Physical Exam:    General: Lying in bed comfortably, no acute distress  Eye: Reactive, symmetric  Throat and Neck: Supple  Lung: Reduced air entry bilaterally with prolonged exhalation but no wheezing. Occasional crackles. Heart: S1+S2. No murmurs  Abdomen: soft, non-tender. Bowel sounds normal. No masses; obese  Extremities: No edema  : Not done  Skin: No cyanosis  Neurologic: A & O x3. Grossly nonfocal  Psychiatric: Appropriate affect; coherent    Lab/Data Review:  Recent Results (from the past 24 hour(s))   CBC WITH AUTOMATED DIFF    Collection Time: 21  5:00 PM   Result Value Ref Range    WBC 16.2 (H) 3.6 - 11.0 K/uL    RBC 3.89 3.80 - 5.20 M/uL    HGB 12.9 11.5 - 16.0 g/dL    HCT 39.3 35.0 - 47.0 %    .0 (H) 80.0 - 99.0 FL    MCH 33.2 26.0 - 34.0 PG    MCHC 32.8 30.0 - 36.5 g/dL    RDW 14.6 (H) 11.5 - 14.5 %    PLATELET 708 (L) 815 - 400 K/uL    MPV 11.8 8.9 - 12.9 FL    NRBC 0.0 0.0  WBC    ABSOLUTE NRBC 0.00 0.00 - 0.01 K/uL    NEUTROPHILS 92 (H) 32 - 75 %    LYMPHOCYTES 2 (L) 12 - 49 %    MONOCYTES 3 (L) 5 - 13 %    EOSINOPHILS 0 0 - 7 %    BASOPHILS 0 0 - 1 %    IMMATURE GRANULOCYTES 3 (H) 0 - 0.5 %    ABS. NEUTROPHILS 14.9 (H) 1.8 - 8.0 K/UL    ABS. LYMPHOCYTES 0.3 (L) 0.8 - 3.5 K/UL    ABS. MONOCYTES 0.5 0.0 - 1.0 K/UL    ABS. EOSINOPHILS 0.0 0.0 - 0.4 K/UL    ABS. BASOPHILS 0.0 0.0 - 0.1 K/UL    ABS. IMM. Day Conde. 0.5 (H) 0.00 - 0.04 K/UL    DF Smear Scanned      RBC COMMENTS Macrocytosis  1+       C REACTIVE PROTEIN, QT    Collection Time: 12/25/21  5:00 PM   Result Value Ref Range    C-Reactive protein 3.56 (H) 0.00 - 0.60 mg/dL     chest X-ray      XR CHEST PORT   Final Result   1. Moderate diffuse bilateral airspace opacities, increased from the previous   exam.   2. Trace bilateral pleural effusions. XR CHEST PORT   Final Result   1. There has been are resolution of the airspace disease previously   demonstrated within the lungs. There is a milder degree residual groundglass   opacity and interstitial disease. The differential diagnosis would include   partially resolved interstitial and alveolar edema versus partially resolved   pneumonitis/aspiration pneumonia. I favor edema given the rapid interval   decrease in the airspace process. 2.  The patient is status post a previous transfemoral aortic valve replacement. XR CHEST PORT   Final Result   Worsening bilateral airspace disease compatible with multi lobar pneumonia   and/or edema in the appropriate clinical setting. XR CHEST PORT   Final Result   Subtle bilateral pulmonary parenchymal opacities. Please see above. XR CHEST PORT    (Results Pending)       CT Results  (Last 48 hours)    None          Assessment:     1. Acute respiratory failure with hypoxia  2. COVID-19 pneumonia  3. History of dementia  4. Hypertension  5. Hypothyroidism  6.   Dyslipidemia    Plan:     Patient admitted to the hospital  Will be watched here closely    Gradual decline in clinical status with increasing weakness  On high flow oxygen 60 L 100% FiO2  Continue oxygen supplementation as needed to keep saturation above 92%    Chest x-ray reviewed  Patient is COVID-19 positive status  Continue dexamethasone  Back on oxygen with worsening bilateral infiltrates on chest x-ray  Remdesivir started 12/20/2021  Last D-dimer 12/23 was 0.3    Chest x-ray  Give Lasix 40 mg IV x1  Already on vancomycin  Zosyn for broader antibiotic coverage    Continue vitamin C and zinc  Tessalon Perles    Starting TPN by primary service. Continue blood pressure medications  Continue Synthroid  Continue Remeron    DVT and GI prophylaxis    Worsening clinical status  Condition tenuous  Prognosis guarded  If declines will require BiPAP or intubation    Questions of patient were answered at bedside in detail  Case discussed in detail with RN, RT, and care team    Thank you for involving me in the care of this patient  I will follow with you closely during hospitalization    Time spent more than 50 minutes in direct patient care with no overlap reviewing results and records, decision-making, and answering questions.       Ariane Michelle MD  [facility]  12/26/2021  1:04 PM

## 2021-12-27 NOTE — PROGRESS NOTES
Hospitalist Progress Note    Subjective:   Daily Progress Note: 12/27/2021 10:02 AM    Patient examined with 1:1 sitter at bedside quietly resting, appears ill and lethargic. She is opening her eyes more today, not communicating much. She continues to have poor oral intake. Patient remains on high flow 60L 100%. Staff reports patient becoming agitated throwing her sheets and pulling her oxygen off.     Current Facility-Administered Medications   Medication Dose Route Frequency    TPN ADULT-PERIPHERAL AA 4.25% D5% + ELECTROLYTES   IntraVENous CONTINUOUS    vancomycin (VANCOCIN) 750 mg in 0.9% sodium chloride 250 mL (VIAL-MATE)  750 mg IntraVENous Q24H    piperacillin-tazobactam (ZOSYN) 3.375 g in 0.9% sodium chloride (MBP/ADV) 100 mL MBP  3.375 g IntraVENous Q8H    dexamethasone (DECADRON) 4 mg/mL injection 6 mg  6 mg IntraVENous Q8H    VANCOMYCIN INFORMATION NOTE   Other Rx Dosing/Monitoring    benzocaine-menthoL (CEPACOL) lozenge 1 Lozenge  1 Lozenge Mucous Membrane PRN    cetirizine (ZYRTEC) tablet 5 mg  5 mg Oral DAILY PRN    benzonatate (TESSALON) capsule 200 mg  200 mg Oral TID    guaiFENesin-codeine (ROBITUSSIN AC) 100-10 mg/5 mL solution 5 mL  5 mL Oral Q6H PRN    amLODIPine (NORVASC) tablet 5 mg  5 mg Oral DAILY    zinc oxide-white petrolatum 17-57 % topical paste   Topical TID    sodium chloride (NS) flush 5-40 mL  5-40 mL IntraVENous Q8H    sodium chloride (NS) flush 5-40 mL  5-40 mL IntraVENous PRN    acetaminophen (TYLENOL) tablet 650 mg  650 mg Oral Q6H PRN    Or    acetaminophen (TYLENOL) suppository 650 mg  650 mg Rectal Q6H PRN    ondansetron (ZOFRAN ODT) tablet 4 mg  4 mg Oral Q6H PRN    Or    ondansetron (ZOFRAN) injection 4 mg  4 mg IntraVENous Q6H PRN    enoxaparin (LOVENOX) injection 30 mg  30 mg SubCUTAneous Q24H    cholecalciferol (VITAMIN D3) (1000 Units /25 mcg) tablet 2,000 Units  2,000 Units Oral DAILY    aspirin delayed-release tablet 81 mg  81 mg Oral DAILY    pantoprazole (PROTONIX) tablet 40 mg  40 mg Oral ACB    clopidogreL (PLAVIX) tablet 75 mg  75 mg Oral DAILY    mirtazapine (REMERON) tablet 15 mg  15 mg Oral QHS    melatonin tablet 10 mg  10 mg Oral QHS    levothyroxine (SYNTHROID) tablet 75 mcg  75 mcg Oral ACB    zinc sulfate (ZINCATE) 50 mg zinc (220 mg) capsule 1 Capsule  1 Capsule Oral DAILY    ascorbic acid (vitamin C) (VITAMIN C) tablet 250 mg  250 mg Oral BID    hydrALAZINE (APRESOLINE) 20 mg/mL injection 20 mg  20 mg IntraVENous Q6H PRN        Review of Systems  Review of Systems   Unable to perform ROS: Mental status change (limited due to lethargy and mental status)   Constitutional: Negative. HENT:        [place]   Respiratory: Positive for cough and shortness of breath. All other systems reviewed and are negative. Objective:     Visit Vitals  BP (!) 158/84   Pulse 78   Temp 97.5 °F (36.4 °C)   Resp 20   Ht 5' (1.524 m)   Wt 61.2 kg (135 lb)   SpO2 97%   BMI 26.37 kg/m²    O2 Flow Rate (L/min): 60 l/min O2 Device: Hi flow nasal cannula    Temp (24hrs), Av.7 °F (35.9 °C), Min:94.4 °F (34.7 °C), Max:97.5 °F (36.4 °C)      No intake/output data recorded.  1901 -  0700  In: -   Out: 300 [Urine:300]    Recent Results (from the past 24 hour(s))   CBC WITH AUTOMATED DIFF    Collection Time: 21  6:45 PM   Result Value Ref Range    WBC 16.1 (H) 3.6 - 11.0 K/uL    RBC 3.73 (L) 3.80 - 5.20 M/uL    HGB 12.3 11.5 - 16.0 g/dL    HCT 37.1 35.0 - 47.0 %    MCV 99.5 (H) 80.0 - 99.0 FL    MCH 33.0 26.0 - 34.0 PG    MCHC 33.2 30.0 - 36.5 g/dL    RDW 14.5 11.5 - 14.5 %    PLATELET 640 (L) 631 - 400 K/uL    MPV 11.1 8.9 - 12.9 FL    NRBC 0.0 0.0  WBC    ABSOLUTE NRBC 0.00 0.00 - 0.01 K/uL    NEUTROPHILS 93 (H) 32 - 75 %    LYMPHOCYTES 2 (L) 12 - 49 %    MONOCYTES 3 (L) 5 - 13 %    EOSINOPHILS 0 0 - 7 %    BASOPHILS 1 0 - 1 %    MYELOCYTES 1 (H) 0 %    IMMATURE GRANULOCYTES 0 %    ABS. NEUTROPHILS 15.0 (H) 1.8 - 8.0 K/UL    ABS. LYMPHOCYTES 0.3 (L) 0.8 - 3.5 K/UL    ABS. MONOCYTES 0.5 0.0 - 1.0 K/UL    ABS. EOSINOPHILS 0.0 0.0 - 0.4 K/UL    ABS. BASOPHILS 0.2 (H) 0.0 - 0.1 K/UL    ABS. IMM. GRANS. 0.0 K/UL    DF Manual      RBC COMMENTS Normocytic, Normochromic     C REACTIVE PROTEIN, QT    Collection Time: 12/26/21  6:45 PM   Result Value Ref Range    C-Reactive protein 2.08 (H) 0.00 - 0.60 mg/dL        XR CHEST PORT   Final Result   No significant change. Bilateral interstitial markings, consistent   with edema, pneumonitis, or possibly fibrosis. Mildly elevated right   hemidiaphragm. Stable appearance of cardiomediastinal silhouette, status post   [place]. Arthritic changes of bilateral shoulders. Cholecystectomy clips. Apical   pleural thickening. No pneumothorax. XR CHEST PORT   Final Result   1. Moderate diffuse bilateral airspace opacities, increased from the previous   exam.   2. Trace bilateral pleural effusions. XR CHEST PORT   Final Result   1. There has been are resolution of the airspace disease previously   demonstrated within the lungs. There is a milder degree residual groundglass   opacity and interstitial disease. The differential diagnosis would include   partially resolved interstitial and alveolar edema versus partially resolved   pneumonitis/aspiration pneumonia. I favor edema given the rapid interval   decrease in the airspace process. 2.  The patient is status post a previous transfemoral aortic valve replacement. XR CHEST PORT   Final Result   Worsening bilateral airspace disease compatible with multi lobar pneumonia   and/or edema in the appropriate clinical setting. XR CHEST PORT   Final Result   Subtle bilateral pulmonary parenchymal opacities. Please see above. PHYSICAL EXAM:    Physical Exam  Vitals and nursing note reviewed. Constitutional:       Comments: Lethargic, weak   HENT:      Head: Normocephalic and atraumatic.       Ears:      Comments: [employer] hearing aides present bilaterally  Cardiovascular:      Rate and Rhythm: Normal rate and regular rhythm. Pulmonary:      Effort: Respiratory distress present. Breath sounds: No wheezing. Comments: High flow 60L 100%  Musculoskeletal:      Right lower leg: No edema. Left lower leg: No edema. Skin:     General: Skin is warm. Capillary Refill: Capillary refill takes less than 2 seconds. Psychiatric:      Comments: Lethargic, weak        Data Review    Recent Results (from the past 24 hour(s))   CBC WITH AUTOMATED DIFF    Collection Time: 12/26/21  6:45 PM   Result Value Ref Range    WBC 16.1 (H) 3.6 - 11.0 K/uL    RBC 3.73 (L) 3.80 - 5.20 M/uL    HGB 12.3 11.5 - 16.0 g/dL    HCT 37.1 35.0 - 47.0 %    MCV 99.5 (H) 80.0 - 99.0 FL    MCH 33.0 26.0 - 34.0 PG    MCHC 33.2 30.0 - 36.5 g/dL    RDW 14.5 11.5 - 14.5 %    PLATELET 493 (L) 826 - 400 K/uL    MPV 11.1 8.9 - 12.9 FL    NRBC 0.0 0.0  WBC    ABSOLUTE NRBC 0.00 0.00 - 0.01 K/uL    NEUTROPHILS 93 (H) 32 - 75 %    LYMPHOCYTES 2 (L) 12 - 49 %    MONOCYTES 3 (L) 5 - 13 %    EOSINOPHILS 0 0 - 7 %    BASOPHILS 1 0 - 1 %    MYELOCYTES 1 (H) 0 %    IMMATURE GRANULOCYTES 0 %    ABS. NEUTROPHILS 15.0 (H) 1.8 - 8.0 K/UL    ABS. LYMPHOCYTES 0.3 (L) 0.8 - 3.5 K/UL    ABS. MONOCYTES 0.5 0.0 - 1.0 K/UL    ABS. EOSINOPHILS 0.0 0.0 - 0.4 K/UL    ABS. BASOPHILS 0.2 (H) 0.0 - 0.1 K/UL    ABS. IMM.  GRANS. 0.0 K/UL    DF Manual      RBC COMMENTS Normocytic, Normochromic     C REACTIVE PROTEIN, QT    Collection Time: 12/26/21  6:45 PM   Result Value Ref Range    C-Reactive protein 2.08 (H) 0.00 - 0.60 mg/dL        Assessment/Plan:     Principal Problem:    Pneumonia due to COVID-19 virus (12/17/2021)    Active Problems:    Essential hypertension (8/22/2017)      Acquired hypothyroidism (8/22/2017)      Hypoxia (12/17/2021)      Dementia without behavioral disturbance ([facility] [address] (12/20/2021)      Acute respiratory failure with hypoxia ([facility] [address] (12/20/2021)        Hospital Course:    Lance Rodriguez is a 80-year-old female with a history of dementia, hypertension, hypothyroidism, and hyperlipidemia from [place] [address] that presented to the emergency room on 12/17/2021 for a 4-day history of cough. She was exposed to Covid. Rapid covid positive. Chest x-ray 12/17 shows signs concerning for pneumonia. Patient admitted for further Covid 19 work-up. Significant blood work for hypokalemia and creatinine of 1.53. Patient was placed on IV Decadron and azithromycin. Patient found to have elevated pro-Jose and LD. Pulmonology consulted. Elevated CRP. D-dimer normal. ABG 7.44/29/61 on 12/19 on nasal canula. Chest x-ray from 12/20 showing worsening bilateral airspace disease compatible with multilobar pneumonia and/or edema. Remdesivir started on 12/20/2021.12/17 blood: staphylococcus, coagulase negative will start on vancomycin. CXR 12/22 demonstrates resolution of the airspace disease previouslydemonstrated within the lungs. There is a milder degree residual ground glass opacity and interstitial disease. Continue IV vancomycin and zosyn. ABg 7.45/33/24. Repeat d-dimer is . 33. Speech recommending diet downgrade to minced and moist 5 with mildly thick liquids with 1:1 assistance with ALL PO intake. Repeat cxr shows bilateral diffuse airspace opacities. 12/25 jesus flow increased to 60 L 100%. Continue TPN and lipids. Continue vancomycin, Zosyn, vitamin C, vitamin D, zinc, Decadron 6 mg every 8 hours. 12/26 repeat cxr shows persistent pneumonia. Acute hypoxic respiratory failure secondary to Covid pneumonia   Superimposed aspiration PNA?   Infection markers trending downward  On vancomycin, zosyn, vitamin C, vitamin D, and zinc  S/p treatment with remdesivir  IV Decadron from 6mg q8hrs  Patient with increasing oxygen demand, now on high flow 60L 100%  Tessalon, cough syrup, and lozenges as needed  12/17 blood: staphylococcus, coagulase negative, final  One-to-one sitter for safety  Speech therapy following  Pulmonology following    Hypertension  BP slightly elevated but remains at goal  Continue norvasc 5mg  Hydralazine 20mg IV q6hrs PRN    Hypothyroidism  Continue with Synthroid 75mcg    Dementia:  Continue Remeron 15mg qhs  Continue 1:1 sitter for safety  UA unremarkable    Poor oral intake:  Secondary to covid pneumonia  Continue tpn, awaiting picc line placement  RD consulted      DVT Prophylaxis: lovenox  GI Prophylaxis: protonix  Discharge and disposition barriers:   >72 hr  Continue  IV decadron, antibiotics  Continue tpn  Continue high flow, wean as tolerated    Donaldo Ramirez, daughter [phone]     Treatment plan discussed with Interdisciplinary team in detail. Overall prognosis remains guarded    Care Plan discussed with: Interdisciplinary team    Total time spent with patient: 35 minutes.

## 2021-12-27 NOTE — PROGRESS NOTES
Pulmonology and Critical Care Progress Note    Subjective:       Patient seen and examined  Overnight events noted    Lying in bed comfortably, she had a sitter at the bedside. She is quite somnolent but easily arousable. She moans during examination but does not verbalize any complaints. Somewhat tachypneic and restless. She is now on 60 L 100% high flow oxygen. She remains a full code. now on high flow oxygen 60 L 100%  ABGs and chest x-ray reviewed    Chest x-ray  reviewed and shows worsening bilateral infiltrates with small pleural effusions      Review of Systems:  Review of systems not obtained due to patient factors.     Current Facility-Administered Medications   Medication Dose Route Frequency Provider Last Rate Last Admin    fat emulsion 20% (LIPOSYN, INTRAlipid) infusion 250 mL  250 mL IntraVENous ONCE Cristian Willard NP        TPN ADULT-AA 4.25% D 5% PERIPHERAL   IntraVENous CONTINUOUS Cristian Willard NP        TPN ADULT-PERIPHERAL AA 4.25% D5% + ELECTROLYTES   IntraVENous CONTINUOUS Cristian Willard NP 83 mL/hr at 12/26/21 2250 New Bag at 12/26/21 2250    vancomycin (VANCOCIN) 750 mg in 0.9% sodium chloride 250 mL (VIAL-MATE)  750 mg IntraVENous Q24H Chase Mercer  mL/hr at 12/26/21 1412 750 mg at 12/26/21 1412    piperacillin-tazobactam (ZOSYN) 3.375 g in 0.9% sodium chloride (MBP/ADV) 100 mL MBP  3.375 g IntraVENous Q8H Corona Abraham MD 25 mL/hr at 12/27/21 1645 3.375 g at 12/27/21 1645    dexamethasone (DECADRON) 4 mg/mL injection 6 mg  6 mg IntraVENous Q8H Angela Amezquita PA   6 mg at 12/27/21 1645    VANCOMYCIN INFORMATION NOTE   Other Rx Dosing/Monitoring Wei Prince MD        benzocaine-menthoL (CEPACOL) lozenge 1 Lozenge  1 Lozenge Mucous Membrane PRN Angela Amezquita PA        cetirizine (ZYRTEC) tablet 5 mg  5 mg Oral DAILY PRN Krystian Amezquita PA        benzonatate (TESSALON) capsule 200 mg  200 mg Oral TID Ana Laura Galindo PA-C   200 mg at 12/27/21 3321  guaiFENesin-codeine (ROBITUSSIN AC) 100-10 mg/5 mL solution 5 mL  5 mL Oral Q6H PRN Ana Laura Galindo PA-C   5 mL at 12/26/21 2247    amLODIPine (NORVASC) tablet 5 mg  5 mg Oral DAILY Ana Laura Galindo PA-C   5 mg at 12/27/21 0931    zinc oxide-white petrolatum 17-57 % topical paste   Topical TID Latrice Jimenez PA-C   Given at 12/27/21 1600    sodium chloride (NS) flush 5-40 mL  5-40 mL IntraVENous Q8H Rachel Duron MD   10 mL at 12/27/21 1645    sodium chloride (NS) flush 5-40 mL  5-40 mL IntraVENous PRN Rachel Duron MD        acetaminophen (TYLENOL) tablet 650 mg  650 mg Oral Q6H PRN Rachel Duron MD   650 mg at 12/23/21 2233    Or    acetaminophen (TYLENOL) suppository 650 mg  650 mg Rectal Q6H PRN Rachel Duron MD        ondansetron (ZOFRAN ODT) tablet 4 mg  4 mg Oral Q6H PRN Rachel Duron MD        Or    ondansetron (ZOFRAN) injection 4 mg  4 mg IntraVENous Q6H PRN Rachel Duron MD        enoxaparin (LOVENOX) injection 30 mg  30 mg SubCUTAneous Q24H Rachel Duron MD   30 mg at 12/27/21 0532    cholecalciferol (VITAMIN D3) (1000 Units /25 mcg) tablet 2,000 Units  2,000 Units Oral DAILY Rachel Duron MD   2,000 Units at 12/27/21 0931    aspirin delayed-release tablet 81 mg  81 mg Oral DAILY Rachel Duron MD   81 mg at 12/27/21 0931    pantoprazole (PROTONIX) tablet 40 mg  40 mg Oral ACB Rachel Duron MD   40 mg at 12/27/21 0931    clopidogreL (PLAVIX) tablet 75 mg  75 mg Oral DAILY Rachel Duron MD   75 mg at 12/27/21 0931    mirtazapine (REMERON) tablet 15 mg  15 mg Oral QHS Rachel Duron MD   15 mg at 12/26/21 2246    melatonin tablet 10 mg  10 mg Oral QHS Rachel Duron MD   10 mg at 12/26/21 2247    levothyroxine (SYNTHROID) tablet 75 mcg  75 mcg Oral ACB Rachel Duron MD   75 mcg at 12/27/21 0948    zinc sulfate (ZINCATE) 50 mg zinc (220 mg) capsule 1 Capsule  1 Capsule Oral DAILY Ana Laura Galindo PA-C   1 Capsule at 21 0931    ascorbic acid (vitamin C) (VITAMIN C) tablet 250 mg  250 mg Oral BID Dion KearsargeREBECCA   250 mg at 21 0931    hydrALAZINE (APRESOLINE) 20 mg/mL injection 20 mg  20 mg IntraVENous Q6H PRN Ana Laura Galindo PA-C                No Known Allergies        Objective:     Blood pressure 106/62, pulse (!) 53, temperature 97.6 °F (36.4 °C), resp. rate 17, height 5' (1.524 m), weight 61.2 kg (135 lb), SpO2 100 %. Temp (24hrs), Av.9 °F (36.1 °C), Min:94.4 °F (34.7 °C), Max:97.6 °F (36.4 °C)      Intake and Output:  Current Shift: No intake/output data recorded. Last 3 Shifts:  190 -  0700  In: -   Out: 300 [Urine:300]    Physical Exam:    General: Lying in bed comfortably, no acute distress  Eye: Reactive, symmetric  Throat and Neck: Supple  Lung: Reduced air entry bilaterally with prolonged exhalation but no wheezing. Occasional crackles. Heart: S1+S2. No murmurs  Abdomen: soft, non-tender. Bowel sounds normal. No masses; obese  Extremities: No edema  : Not done  Skin: No cyanosis  Neurologic: A & O x3. Grossly nonfocal  Psychiatric: Appropriate affect; coherent    Lab/Data Review:  Recent Results (from the past 24 hour(s))   CBC WITH AUTOMATED DIFF    Collection Time: 21  6:45 PM   Result Value Ref Range    WBC 16.1 (H) 3.6 - 11.0 K/uL    RBC 3.73 (L) 3.80 - 5.20 M/uL    HGB 12.3 11.5 - 16.0 g/dL    HCT 37.1 35.0 - 47.0 %    MCV 99.5 (H) 80.0 - 99.0 FL    MCH 33.0 26.0 - 34.0 PG    MCHC 33.2 30.0 - 36.5 g/dL    RDW 14.5 11.5 - 14.5 %    PLATELET 357 (L) 091 - 400 K/uL    MPV 11.1 8.9 - 12.9 FL    NRBC 0.0 0.0  WBC    ABSOLUTE NRBC 0.00 0.00 - 0.01 K/uL    NEUTROPHILS 93 (H) 32 - 75 %    LYMPHOCYTES 2 (L) 12 - 49 %    MONOCYTES 3 (L) 5 - 13 %    EOSINOPHILS 0 0 - 7 %    BASOPHILS 1 0 - 1 %    MYELOCYTES 1 (H) 0 %    IMMATURE GRANULOCYTES 0 %    ABS. NEUTROPHILS 15.0 (H) 1.8 - 8.0 K/UL    ABS.  LYMPHOCYTES 0.3 (L) 0.8 - 3.5 K/UL    ABS. MONOCYTES 0.5 0.0 - 1.0 K/UL    ABS. EOSINOPHILS 0.0 0.0 - 0.4 K/UL    ABS. BASOPHILS 0.2 (H) 0.0 - 0.1 K/UL    ABS. IMM. GRANS. 0.0 K/UL    DF Manual      RBC COMMENTS Normocytic, Normochromic     C REACTIVE PROTEIN, QT    Collection Time: 12/26/21  6:45 PM   Result Value Ref Range    C-Reactive protein 2.08 (H) 0.00 - 0.60 mg/dL   CBC WITH AUTOMATED DIFF    Collection Time: 12/27/21  3:45 PM   Result Value Ref Range    WBC 17.6 (H) 3.6 - 11.0 K/uL    RBC 3.54 (L) 3.80 - 5.20 M/uL    HGB 11.7 11.5 - 16.0 g/dL    HCT 34.4 (L) 35.0 - 47.0 %    MCV 97.2 80.0 - 99.0 FL    MCH 33.1 26.0 - 34.0 PG    MCHC 34.0 30.0 - 36.5 g/dL    RDW 14.4 11.5 - 14.5 %    PLATELET 97 (L) 810 - 400 K/uL    MPV 12.0 8.9 - 12.9 FL    NRBC 0.0 0.0  WBC    ABSOLUTE NRBC 0.00 0.00 - 0.01 K/uL    NEUTROPHILS PENDING %    LYMPHOCYTES PENDING %    MONOCYTES PENDING %    EOSINOPHILS PENDING %    BASOPHILS PENDING %    IMMATURE GRANULOCYTES PENDING %    ABS. NEUTROPHILS PENDING K/UL    ABS. LYMPHOCYTES PENDING K/UL    ABS. MONOCYTES PENDING K/UL    ABS. EOSINOPHILS PENDING K/UL    ABS. BASOPHILS PENDING K/UL    ABS. IMM. GRANS.  PENDING K/UL    DF PENDING    C REACTIVE PROTEIN, QT    Collection Time: 12/27/21  3:45 PM   Result Value Ref Range    C-Reactive protein 1.45 (H) 0.00 - 0.60 mg/dL   [place]    Collection Time: 12/27/21  3:45 PM   Result Value Ref Range    Vancomycin,trough 10.3 (H) 5.0 - 10.0 ug/mL    Reported dose date Dose Dependent      Reported dose: Dose Dependent Units   METABOLIC PANEL, COMPREHENSIVE    Collection Time: 12/27/21  3:45 PM   Result Value Ref Range    Sodium 148 (H) 136 - 145 mmol/L    Potassium 3.9 3.5 - 5.1 mmol/L    Chloride 119 (H) 97 - 108 mmol/L    CO2 24 21 - 32 mmol/L    Anion gap 5 5 - 15 mmol/L    Glucose 219 (H) 65 - 100 mg/dL    BUN 44 (H) 6 - 20 mg/dL    Creatinine 0.78 0.55 - 1.02 mg/dL    BUN/Creatinine ratio 56 (H) 12 - 20      GFR est AA >60 >60 ml/min/1.73m2    GFR est non-AA >60 >60 ml/min/1.73m2    Calcium 8.7 8.5 - 10.1 mg/dL    Bilirubin, total 0.6 0.2 - 1.0 mg/dL    AST (SGOT) 58 (H) 15 - 37 U/L    ALT (SGPT) 83 (H) 12 - 78 U/L    Alk. phosphatase 127 (H) 45 - 117 U/L    Protein, total 5.4 (L) 6.4 - 8.2 g/dL    Albumin 2.2 (L) 3.5 - 5.0 g/dL    Globulin 3.2 2.0 - 4.0 g/dL    A-G Ratio 0.7 (L) 1.1 - 2.2       chest X-ray      XR CHEST PORT   Final Result   No significant change. Bilateral interstitial markings, consistent   with edema, pneumonitis, or possibly fibrosis. Mildly elevated right   hemidiaphragm. Stable appearance of cardiomediastinal silhouette, status post   [place]. Arthritic changes of bilateral shoulders. Cholecystectomy clips. Apical   pleural thickening. No pneumothorax. XR CHEST PORT   Final Result   1. Moderate diffuse bilateral airspace opacities, increased from the previous   exam.   2. Trace bilateral pleural effusions. XR CHEST PORT   Final Result   1. There has been are resolution of the airspace disease previously   demonstrated within the lungs. There is a milder degree residual groundglass   opacity and interstitial disease. The differential diagnosis would include   partially resolved interstitial and alveolar edema versus partially resolved   pneumonitis/aspiration pneumonia. I favor edema given the rapid interval   decrease in the airspace process. 2.  The patient is status post a previous transfemoral aortic valve replacement. XR CHEST PORT   Final Result   Worsening bilateral airspace disease compatible with multi lobar pneumonia   and/or edema in the appropriate clinical setting. XR CHEST PORT   Final Result   Subtle bilateral pulmonary parenchymal opacities. Please see above. CT Results  (Last 48 hours)    None          Assessment:     1. Acute respiratory failure with hypoxia  2. COVID-19 pneumonia  3. History of dementia  4. Hypertension  5. Hypothyroidism  6. Dyslipidemia    Plan:     Patient admitted to the hospital  Will be watched here closely    Gradual decline in clinical status with increasing weakness  On high flow oxygen 60 L 100% FiO2  However her saturations 99%. I believe there is room to wean down. We will also obtain a blood gas in the morning. Repeat chest x-ray in the morning. Continue oxygen supplementation as needed to keep saturation above 92%    Chest x-ray reviewed  Patient is COVID-19 positive status  Continue dexamethasone  Back on oxygen with worsening bilateral infiltrates on chest x-ray  Remdesivir started 12/20/2021  Last D-dimer 12/23 was 0.3    Already on vancomycin  Zosyn for broader antibiotic coverage    Continue vitamin C and zinc  Tessalon Perles    Starting TPN by primary service. Continue blood pressure medications  Continue Synthroid  Continue Remeron    DVT and GI prophylaxis    Condition tenuous  Prognosis guarded  If declines will require BiPAP or intubation  Apparently she remains a full code. Will discuss with family members, possible family meeting tomorrow along with primary physician regarding goals of care. I would advocate conservative measures on this elderly patient with underlying dementia. Case discussed in detail with RN, RT, and care team   discussed with primary physician. Thank you for involving me in the care of this patient  I will follow with you closely during hospitalization    Time spent more than 30 minutes in direct patient care with no overlap reviewing results and records, decision-making, and answering questions.       Joanna Jean MD  [facility]  12/27/2021

## 2021-12-27 NOTE — PROGRESS NOTES
Spoke with Pharmacy and made him aware that results are back for trough level. Pharmacist states that he will adjust according to results.

## 2021-12-27 NOTE — PROGRESS NOTES
12/27/2021    No parenteral nutrition orders placed as of 1450 for this patient today. [facility] policy states that physician or authorized party must enter new parenteral nutrition orders daily and be received in pharmacy dept by 1400. Pharmacy dept cannot guarantee ability to compound any orders received after 1400. In the event patient remains NPO and would benefit from IV calories, a D10W infusion at the same rate of the previous days order may be ordered and administered per policy.     Amara UrenaD BCPS

## 2021-12-27 NOTE — PROGRESS NOTES
Made tree the Pharmacy aware that patients trough level was just drawn he states to hold off on Vancomycin until results come back

## 2021-12-27 NOTE — ROUTINE PROCESS
Bedside and Verbal shift change report given to Gloria Gonzalez RN (oncoming nurse) by Paul Piedra (offgoing nurse). Report included the following information SBAR, Kardex, MAR, Accordion, Recent Results, Med Rec Status and Quality Measures.

## 2021-12-28 NOTE — PROGRESS NOTES
Hospitalist Progress Note    Subjective:   Daily Progress Note: 12/28/2021 10:02 AM    Patient examined with 1:1 sitter at bedside quietly resting, appears ill and lethargic. She is opening her eyes more today, not communicating much. Staff reports she contShe continues to have poor oral intake. Patient remains on high flow 60L 100%.       Current Facility-Administered Medications   Medication Dose Route Frequency    TPN ADULT-AA 4.25% D 5% PERIPHERAL   IntraVENous CONTINUOUS    vancomycin (VANCOCIN) 1,250 mg in 0.9% sodium chloride 250 mL (VIAL-MATE)  1,250 mg IntraVENous Q24H    [START ON 12/29/2021] Vancomycin - Trough to be drawn prior to dose 12/29 @ 2000   Other ONCE    piperacillin-tazobactam (ZOSYN) 3.375 g in 0.9% sodium chloride (MBP/ADV) 100 mL MBP  3.375 g IntraVENous Q8H    dexamethasone (DECADRON) 4 mg/mL injection 6 mg  6 mg IntraVENous Q8H    VANCOMYCIN INFORMATION NOTE   Other Rx Dosing/Monitoring    benzocaine-menthoL (CEPACOL) lozenge 1 Lozenge  1 Lozenge Mucous Membrane PRN    cetirizine (ZYRTEC) tablet 5 mg  5 mg Oral DAILY PRN    benzonatate (TESSALON) capsule 200 mg  200 mg Oral TID    guaiFENesin-codeine (ROBITUSSIN AC) 100-10 mg/5 mL solution 5 mL  5 mL Oral Q6H PRN    amLODIPine (NORVASC) tablet 5 mg  5 mg Oral DAILY    zinc oxide-white petrolatum 17-57 % topical paste   Topical TID    sodium chloride (NS) flush 5-40 mL  5-40 mL IntraVENous Q8H    sodium chloride (NS) flush 5-40 mL  5-40 mL IntraVENous PRN    acetaminophen (TYLENOL) tablet 650 mg  650 mg Oral Q6H PRN    Or    acetaminophen (TYLENOL) suppository 650 mg  650 mg Rectal Q6H PRN    ondansetron (ZOFRAN ODT) tablet 4 mg  4 mg Oral Q6H PRN    Or    ondansetron (ZOFRAN) injection 4 mg  4 mg IntraVENous Q6H PRN    enoxaparin (LOVENOX) injection 30 mg  30 mg SubCUTAneous Q24H    cholecalciferol (VITAMIN D3) (1000 Units /25 mcg) tablet 2,000 Units  2,000 Units Oral DAILY    aspirin delayed-release tablet 81 mg  81 mg Oral DAILY    pantoprazole (PROTONIX) tablet 40 mg  40 mg Oral ACB    clopidogreL (PLAVIX) tablet 75 mg  75 mg Oral DAILY    mirtazapine (REMERON) tablet 15 mg  15 mg Oral QHS    melatonin tablet 10 mg  10 mg Oral QHS    levothyroxine (SYNTHROID) tablet 75 mcg  75 mcg Oral ACB    zinc sulfate (ZINCATE) 50 mg zinc (220 mg) capsule 1 Capsule  1 Capsule Oral DAILY    ascorbic acid (vitamin C) (VITAMIN C) tablet 250 mg  250 mg Oral BID    hydrALAZINE (APRESOLINE) 20 mg/mL injection 20 mg  20 mg IntraVENous Q6H PRN        Review of Systems  Review of Systems   Unable to perform ROS: Mental status change (limited due to lethargy and mental status)   Constitutional: Negative. HENT:        [place]   Respiratory: Positive for shortness of breath. All other systems reviewed and are negative. Objective:     Visit Vitals  BP (!) 142/75   Pulse 74   Temp 97.8 °F (36.6 °C)   Resp 24   Ht 5' (1.524 m)   Wt 65.7 kg (144 lb 13.5 oz)   SpO2 96%   BMI 28.29 kg/m²    O2 Flow Rate (L/min): 60 l/min O2 Device: Heated,Hi flow nasal cannula    Temp (24hrs), Av.8 °F (36.6 °C), Min:97.6 °F (36.4 °C), Max:97.9 °F (36.6 °C)      No intake/output data recorded.  1901 -  0700  In: .1 [I.V.:.1]  Out: 400 [Urine:400]    Recent Results (from the past 24 hour(s))   CBC WITH AUTOMATED DIFF    Collection Time: 21  3:45 PM   Result Value Ref Range    WBC 17.6 (H) 3.6 - 11.0 K/uL    RBC 3.54 (L) 3.80 - 5.20 M/uL    HGB 11.7 11.5 - 16.0 g/dL    HCT 34.4 (L) 35.0 - 47.0 %    MCV 97.2 80.0 - 99.0 FL    MCH 33.1 26.0 - 34.0 PG    MCHC 34.0 30.0 - 36.5 g/dL    RDW 14.4 11.5 - 14.5 %    PLATELET 97 (L) 445 - 400 K/uL    MPV 12.0 8.9 - 12.9 FL    NRBC 0.0 0.0  WBC    ABSOLUTE NRBC 0.00 0.00 - 0.01 K/uL    NEUTROPHILS 96 (H) 32 - 75 %    LYMPHOCYTES 2 (L) 12 - 49 %    MONOCYTES 2 (L) 5 - 13 %    EOSINOPHILS 0 0 - 7 %    BASOPHILS 0 0 - 1 %    IMMATURE GRANULOCYTES 0 %    ABS.  NEUTROPHILS 16.8 (H) 1.8 - 8.0 K/UL    ABS. LYMPHOCYTES 0.4 (L) 0.8 - 3.5 K/UL    ABS. MONOCYTES 0.4 0.0 - 1.0 K/UL    ABS. EOSINOPHILS 0.0 0.0 - 0.4 K/UL    ABS. BASOPHILS 0.0 0.0 - 0.1 K/UL    ABS. IMM. GRANS. 0.0 K/UL    DF Manual      RBC COMMENTS Normocytic, Normochromic     C REACTIVE PROTEIN, QT    Collection Time: 12/27/21  3:45 PM   Result Value Ref Range    C-Reactive protein 1.45 (H) 0.00 - 0.60 mg/dL   [place] [facility]    Collection Time: 12/27/21  3:45 PM   Result Value Ref Range    Vancomycin,trough 10.3 (H) 5.0 - 10.0 ug/mL    Reported dose date Dose Dependent      Reported dose: Dose Dependent Units   METABOLIC PANEL, COMPREHENSIVE    Collection Time: 12/27/21  3:45 PM   Result Value Ref Range    Sodium 148 (H) 136 - 145 mmol/L    Potassium 3.9 3.5 - 5.1 mmol/L    Chloride 119 (H) 97 - 108 mmol/L    CO2 24 21 - 32 mmol/L    Anion gap 5 5 - 15 mmol/L    Glucose 219 (H) 65 - 100 mg/dL    BUN 44 (H) 6 - 20 mg/dL    Creatinine 0.78 0.55 - 1.02 mg/dL    BUN/Creatinine ratio 56 (H) 12 - 20      GFR est AA >60 >60 ml/min/1.73m2    GFR est non-AA >60 >60 ml/min/1.73m2    Calcium 8.7 8.5 - 10.1 mg/dL    Bilirubin, total 0.6 0.2 - 1.0 mg/dL    AST (SGOT) 58 (H) 15 - 37 U/L    ALT (SGPT) 83 (H) 12 - 78 U/L    Alk.  phosphatase 127 (H) 45 - 117 U/L    Protein, total 5.4 (L) 6.4 - 8.2 g/dL    Albumin 2.2 (L) 3.5 - 5.0 g/dL    Globulin 3.2 2.0 - 4.0 g/dL    A-G Ratio 0.7 (L) 1.1 - 2.2     BLOOD GAS, ARTERIAL    Collection Time: 12/28/21  5:15 AM   Result Value Ref Range    pH 7.48 (H) 7.35 - 7.45      PCO2 30 (L) 35 - 45 mmHg    PO2 75 75 - 100 mmHg    O2 SAT 96 >95 %    BICARBONATE 24 22 - 26 mmol/L    BASE DEFICIT 0.3 0 - 2 mmol/L    O2 METHOD High Flow O2      O2 FLOW RATE 60.0 L/min    FIO2 100.0 %    EPAP/CPAP/PEEP 0      SITE Left Brachial      ESTEPHANIE'S TEST PASS     GLUCOSE, POC    Collection Time: 12/28/21  6:13 AM   Result Value Ref Range    Glucose (POC) 180 (H) 65 - 117 mg/dL    Performed by [facility] CHEST PORT Final Result   No significant interval change. XR CHEST PORT   Final Result   No significant change. Bilateral interstitial markings, consistent   with edema, pneumonitis, or possibly fibrosis. Mildly elevated right   hemidiaphragm. Stable appearance of cardiomediastinal silhouette, status post   [place]. Arthritic changes of bilateral shoulders. Cholecystectomy clips. Apical   pleural thickening. No pneumothorax. XR CHEST PORT   Final Result   1. Moderate diffuse bilateral airspace opacities, increased from the previous   exam.   2. Trace bilateral pleural effusions. XR CHEST PORT   Final Result   1. There has been are resolution of the airspace disease previously   demonstrated within the lungs. There is a milder degree residual groundglass   opacity and interstitial disease. The differential diagnosis would include   partially resolved interstitial and alveolar edema versus partially resolved   pneumonitis/aspiration pneumonia. I favor edema given the rapid interval   decrease in the airspace process. 2.  The patient is status post a previous transfemoral aortic valve replacement. XR CHEST PORT   Final Result   Worsening bilateral airspace disease compatible with multi lobar pneumonia   and/or edema in the appropriate clinical setting. XR CHEST PORT   Final Result   Subtle bilateral pulmonary parenchymal opacities. Please see above. PHYSICAL EXAM:    Physical Exam  Vitals and nursing note reviewed. Constitutional:       Comments: Lethargic, weak   HENT:      Head: Normocephalic and atraumatic. Ears:      Comments: [place] hearing aides present bilaterally  Cardiovascular:      Rate and Rhythm: Normal rate and regular rhythm. Pulmonary:      Effort: Respiratory distress present. Breath sounds: No wheezing. Comments: High flow 60L 100%  Musculoskeletal:      Right lower leg: No edema. Left lower leg: No edema. Skin:     General: Skin is warm. Capillary Refill: Capillary refill takes less than 2 seconds. Neurological:      Mental Status: She is disoriented. Psychiatric:      Comments: Lethargic, weak        Data Review    Recent Results (from the past 24 hour(s))   CBC WITH AUTOMATED DIFF    Collection Time: 12/27/21  3:45 PM   Result Value Ref Range    WBC 17.6 (H) 3.6 - 11.0 K/uL    RBC 3.54 (L) 3.80 - 5.20 M/uL    HGB 11.7 11.5 - 16.0 g/dL    HCT 34.4 (L) 35.0 - 47.0 %    MCV 97.2 80.0 - 99.0 FL    MCH 33.1 26.0 - 34.0 PG    MCHC 34.0 30.0 - 36.5 g/dL    RDW 14.4 11.5 - 14.5 %    PLATELET 97 (L) 904 - 400 K/uL    MPV 12.0 8.9 - 12.9 FL    NRBC 0.0 0.0  WBC    ABSOLUTE NRBC 0.00 0.00 - 0.01 K/uL    NEUTROPHILS 96 (H) 32 - 75 %    LYMPHOCYTES 2 (L) 12 - 49 %    MONOCYTES 2 (L) 5 - 13 %    EOSINOPHILS 0 0 - 7 %    BASOPHILS 0 0 - 1 %    IMMATURE GRANULOCYTES 0 %    ABS. NEUTROPHILS 16.8 (H) 1.8 - 8.0 K/UL    ABS. LYMPHOCYTES 0.4 (L) 0.8 - 3.5 K/UL    ABS. MONOCYTES 0.4 0.0 - 1.0 K/UL    ABS. EOSINOPHILS 0.0 0.0 - 0.4 K/UL    ABS. BASOPHILS 0.0 0.0 - 0.1 K/UL    ABS. IMM.  GRANS. 0.0 K/UL    DF Manual      RBC COMMENTS Normocytic, Normochromic     C REACTIVE PROTEIN, QT    Collection Time: 12/27/21  3:45 PM   Result Value Ref Range    C-Reactive protein 1.45 (H) 0.00 - 0.60 mg/dL   [place] [facility]    Collection Time: 12/27/21  3:45 PM   Result Value Ref Range    Vancomycin,trough 10.3 (H) 5.0 - 10.0 ug/mL    Reported dose date Dose Dependent      Reported dose: Dose Dependent Units   METABOLIC PANEL, COMPREHENSIVE    Collection Time: 12/27/21  3:45 PM   Result Value Ref Range    Sodium 148 (H) 136 - 145 mmol/L    Potassium 3.9 3.5 - 5.1 mmol/L    Chloride 119 (H) 97 - 108 mmol/L    CO2 24 21 - 32 mmol/L    Anion gap 5 5 - 15 mmol/L    Glucose 219 (H) 65 - 100 mg/dL    BUN 44 (H) 6 - 20 mg/dL    Creatinine 0.78 0.55 - 1.02 mg/dL    BUN/Creatinine ratio 56 (H) 12 - 20      GFR est AA >60 >60 ml/min/1.73m2    GFR est non-AA >60 >60 ml/min/1.73m2 Calcium 8.7 8.5 - 10.1 mg/dL    Bilirubin, total 0.6 0.2 - 1.0 mg/dL    AST (SGOT) 58 (H) 15 - 37 U/L    ALT (SGPT) 83 (H) 12 - 78 U/L    Alk. phosphatase 127 (H) 45 - 117 U/L    Protein, total 5.4 (L) 6.4 - 8.2 g/dL    Albumin 2.2 (L) 3.5 - 5.0 g/dL    Globulin 3.2 2.0 - 4.0 g/dL    A-G Ratio 0.7 (L) 1.1 - 2.2     BLOOD GAS, ARTERIAL    Collection Time: 12/28/21  5:15 AM   Result Value Ref Range    pH 7.48 (H) 7.35 - 7.45      PCO2 30 (L) 35 - 45 mmHg    PO2 75 75 - 100 mmHg    O2 SAT 96 >95 %    BICARBONATE 24 22 - 26 mmol/L    BASE DEFICIT 0.3 0 - 2 mmol/L    O2 METHOD High Flow O2      O2 FLOW RATE 60.0 L/min    FIO2 100.0 %    EPAP/CPAP/PEEP 0      SITE Left Brachial      ESTEPHANIE'S TEST PASS     GLUCOSE, POC    Collection Time: 12/28/21  6:13 AM   Result Value Ref Range    Glucose (POC) 180 (H) 65 - 117 mg/dL    Performed by Armin Welch         Assessment/Plan:     Principal Problem:    Pneumonia due to COVID-19 virus (12/17/2021)    Active Problems:    Essential hypertension (8/22/2017)      Acquired hypothyroidism (8/22/2017)      Hypoxia (12/17/2021)      Dementia without behavioral disturbance ([address] (12/20/2021)      Acute respiratory failure with hypoxia ([facility] [address] (12/20/2021)        Hospital Course:    Sruthi Franco is a 26-year-old female with a history of dementia, hypertension, hypothyroidism, and hyperlipidemia from [place] [address] that presented to the emergency room on 12/17/2021 for a 4-day history of cough. She was exposed to Covid. Rapid covid positive. Chest x-ray 12/17 shows signs concerning for pneumonia. Patient admitted for further Covid 19 work-up. Significant blood work for hypokalemia and creatinine of 1.53. Patient was placed on IV Decadron and azithromycin. Patient found to have elevated pro-Jose and LD. Pulmonology consulted. Elevated CRP. D-dimer normal. ABG 7.44/29/61 on 12/19 on nasal canula.  Chest x-ray from 12/20 showing worsening bilateral airspace disease compatible with multilobar pneumonia and/or edema. Remdesivir started on 12/20/2021.12/17 blood: staphylococcus, coagulase negative will start on vancomycin. CXR 12/22 demonstrates resolution of the airspace disease previouslydemonstrated within the lungs. There is a milder degree residual ground glass opacity and interstitial disease. Continue IV vancomycin and zosyn. ABg 7.45/33/24. Repeat d-dimer is . 33. Speech recommending diet downgrade to minced and moist 5 with mildly thick liquids with 1:1 assistance with ALL PO intake. Repeat cxr shows bilateral diffuse airspace opacities. 12/25 jesus flow increased to 60 L 100%. Continue TPN and lipids. Continue vancomycin, Zosyn, vitamin C, vitamin D, zinc, Decadron 6 mg every 8 hours. 12/26 repeat cxr shows persistent pneumonia. Acute hypoxic respiratory failure secondary to Covid pneumonia   Superimposed aspiration PNA? Infection markers trending downward  On vancomycin, zosyn, vitamin C, vitamin D, and zinc  S/p treatment with remdesivir  IV Decadron from 6mg q8hrs  Remains on high flow 60L 100%  Tessalon, cough syrup, and lozenges as needed  12/17 blood: staphylococcus, coagulase negative, final  One-to-one sitter for safety  Speech therapy following  Pulmonology following    Hypertension  BP at goal  Continue norvasc 5mg  Hydralazine 20mg IV q6hrs PRN    Hypothyroidism  Continue with Synthroid 75mcg    Dementia:  Continue Remeron 15mg qhs  Continue 1:1 sitter for safety  UA unremarkable    Poor oral intake:  Secondary to covid pneumonia  Continue tpn  RD following    DVT Prophylaxis: lovenox  GI Prophylaxis: protonix  Discharge and disposition barriers:   >72 hr  Continue  IV decadron, antibiotics  Continue tpn  Continue high flow, wean as tolerated    Marko Velasquez, daughter [phone]     Treatment plan discussed with Interdisciplinary team in detail. Overall prognosis remains guarded. Daughter Marko Gave updated on overall condition.  She continues to requests to see her mother. I informed her the patient still remains symptomatic with high flow oxygen that increases the risk for Aerosil contamination. She reports the CDC guidelines. I've informed her I am aware of the guidelines which are recommendations; however, for the patient safety and theirs she can not have visitors. She reports that her and the siblings recently had covid within the last 3 months and are unlikely to contract it again in such short time. I explained to her that is even more reason for us to maintain strict no visitation policy until the patient is making a recovery. She feels that she would be able to get the patient to eat if she was present. Plan for zoom meeting tomorrow at 3pm to discuss overall case and prognosis. Care Plan discussed with: Interdisciplinary team    Total time spent with patient: >45 minutes.

## 2021-12-28 NOTE — PROGRESS NOTES
Problem: Dysphagia (Adult)  Goal: *Acute Goals and Plan of Care (Insert Text)  Description: Speech Therapy Goals  Initiated 12/23/2021  -Patient will participate in modified barium swallow study within 7 day(s), if indicated pending pt's progress. [ ] Not met  [ ]  MET   [ ] Progressing  [x ] Discontinue  -Patient will tolerate minced and moist diet with mildly/nectar thick liquids without signs/symptoms of aspiration given no cues within 7 day(s). [ ] Not met  [ ]  MET   [ ] Progressing  [x ] Discontinue  -Patient will demonstrate understanding of swallow safety precautions and aspiration precautions, diet recs with no cues within 7 day(s). [ ] Not met  [ ]  MET   [ ] Natalya Beatty  [ x] Discontinue  12/28/2021 1808 by Sun Lugo  Outcome: Not Progressing Towards Goal  SPEECH [address] TREATMENT  Patient: Derian Fontana (88 y.o. female)  Date: 12/28/2021  Diagnosis: Pneumonia due to COVID-19 virus [U07.1, J12.82]  Hypoxia [R09.02] Pneumonia due to COVID-19 virus       Precautions: aspiration      ASSESSMENT:  Pt seen for follow-up. Pt is reportedly taking in minimal PO and currently is on TPN and receiving Mm5/mildly thick liquids diet. 1:1 sitter is present. Pt currently on HFNC 100% 60 L/min. Initially, pt's 02 sats 100% but rapid decline to 60-80% during session. Pt given trials of thin water pipetted via straw only. Pt readily accepts PO trials of thin water. There appears to be thrush on pt's tongue, limited view of oral cavity obtained. Oral phase with delayed a-p propulsion. Pharyngeal phase with delayed initiation noted, appears WFL once initiated. No overt s/s aspiration; however, given overall medical status significant concerns for aspiration are present. PLAN:  Recommendations and Planned Interventions:   At this time, significant concerns for pt's medical status are present and have been relayed to nsg and Dr. Lorna Luna. Would recommend holding PO due to overall status. However, pt's intake has been minimal and pt reportedly has only accepted sips of mildly thickened liquids for pleasure. Frequency/Duration: Patient will be followed by speech-language pathology 4 times a week to address goals. Discharge Recommendations:  pending progress     SUBJECTIVE:   Patient alert, moans, speech production is weak. OBJECTIVE:     CXR Results  (Last 48 hours)                 12/28/21 0915  XR CHEST PORT Final result    Impression:  No significant interval change. Narrative:  Clinical history: Respiratory failure       Comparison: Chest radiograph 12/26/2021. Findings:    Single view chest. Lung volumes are unchanged. Ongoing bilateral interstitial   opacities, unchanged, possibly representing edema, atypical infection, and/or   fibrotic change. The cardiac silhouette is unchanged in size. Aortic valve   replacement noted. The osseous structures are intact. Cognitive and Communication Status:  Neurologic State: Confused  Orientation Level: Unable to verbalize  Cognition: Decreased command following    Pain:  Pain Scale 1: Adult Nonverbal Pain Scale  Pain Intensity 1: 0       After treatment:   Patient left in no apparent distress in bed, Call bell within reach, and Nursing notified    COMMUNICATION/EDUCATION:   Patient was educated regarding her deficit(s) of dysphagia, swallow safety precautions, diet recs and POC. She demonstrated poor understanding as evidenced by AMS. The patient's plan of care including recommendations, planned interventions, and recommended diet changes were discussed with: Registered nurse.      Blanka Mcwilliams M.S. CCC-SLP  Time Calculation: 15 mins

## 2021-12-28 NOTE — PROGRESS NOTES
Spoke with [facility] Pharmacist concerning patients vancomycin he will adjust it according to results.

## 2021-12-28 NOTE — PROGRESS NOTES
Problem: Isolation Precautions - Risk of Spread of Infection  Goal: Prevent transmission of infectious organism to others  Outcome: Progressing Towards Goal  Note: Patient is on droplet plus precautions and strict PPE use is being used. Bedside shift change report given to Freddie Castellano RN (oncoming nurse) by Rebecca Paulson RN (offgoing nurse). Report included the following information SBAR, Kardex, ED Summary, Intake/Output, MAR, Accordion and Med Rec Status.

## 2021-12-28 NOTE — PROGRESS NOTES
Comprehensive Nutrition Assessment    Type and Reason for Visit: Reassess    Nutrition Recommendations/Plan:   Continue current Minced & Moist Diet, mildly thick liquids  Continue Ensure Enlive TID  Continue PPN of Clinimix 4.25% AA/5% Dex at 83ml/hr, 250ml 20% lipids daily  Provides 980kcals (64%), 85g protein (115%)    Consider placing central line vs NGT for optimal nutrition     Nutrition Assessment:  Admit from [facility], found COVID-19+ with worsening CXR. On 60L HFNC. Intakes remain poor despite diet texture downgrade and ONS. RN endorses pt eats nothing, refused meds this morning by spitting them out. Noted Mirtazapine on medlist, however ?efficancy if pt refusing PO meds. PPN currently meeting 64% kcal and >100% protein needs, any PO she can be convinced to have will help prevent wt loss. ? Wt gain per wt hx, may be fluid related. Pt would best benefit from NGT vs central line placement for optimal nutrition, however recognize barriers with AMS and proclivity to pull lines and resist treatment. Labs: na 148, , AST 58, ALT 83, ALK PHOS 127. Meds: Vit C, Vit D3, dexamethasone, Remeron, PPI, Zosyn, Vancomycin, ZnSu. Malnutrition Assessment:  Malnutrition Status:  Mild malnutrition    Context:  Acute illness     Findings of the 6 clinical characteristics of malnutrition:   Energy Intake:  1 - 75% or less of est energy req for 7 or more days  Weight Loss:  No significant weight loss     Body Fat Loss:  Unable to assess,     Muscle Mass Loss:  Unable to assess,    Fluid Accumulation:  No significant fluid accumulation,      Estimated Daily Nutrient Needs:  Energy (kcal): 1534 sabrina (25kcal/kg); Weight Used for Energy Requirements: Current  Protein (g): 74 (1.2kg); Weight Used for Protein Requirements: Current  Fluid (ml/day): 1500ml; Method Used for Fluid Requirements: 1 ml/kcal    Nutrition Related Findings:  NFPE not performed d/t COVID. SLP following for mild-mod oropharyngeal dysphagia.  No N/V/D/C per chart review, no edema. Last BM 12/24. Wounds:    Skin tears (Skin tear RLE posterior (not new). )       Current Nutrition Therapies:  ADULT ORAL NUTRITION SUPPLEMENT Breakfast, Lunch, Dinner; Standard High Calorie/High Protein  ADULT DIET Dysphagia - Minced & Moist; Mildly Thick (Nectar)  TPN ADULT-AA 4.25% D 5% PERIPHERAL    Anthropometric Measures:  · Height:  5' (152.4 cm)  · Current Body Wt:  65.7 kg (144 lb 13.5 oz) (12/28)   · Admission Body Wt:  134 lb 14.7 oz     · Ideal Body Wt:  100 lbs:  144.8 %   · BMI Category: Overweight (BMI 25.0-29. 9)       Nutrition Diagnosis:   · Inadequate protein-energy intake related to impaired respiratory function,cognitive or neurological impairment as evidenced by intake 0-25%      Nutrition Interventions:   Food and/or Nutrient Delivery: Continue current diet,Continue oral nutrition supplement,Continue parenteral nutrition  Nutrition Education and Counseling: No recommendations at this time,Education not appropriate  Coordination of Nutrition Care: Continue to monitor while inpatient    Goals:  intakes >/=50% of EENs in >5 days, Wt maintenance within +/-0.5kg in >5 days       Nutrition Monitoring and Evaluation:   Behavioral-Environmental Outcomes: None identified  Food/Nutrient Intake Outcomes: Diet advancement/tolerance,Food and nutrient intake,Supplement intake,Parenteral nutrition intake/tolerance  Physical Signs/Symptoms Outcomes: Biochemical data,Weight,Meal time behavior    Discharge Planning:     Too soon to determine     Electronically signed by State Ramirez on 12/28/2021 at 12:26 PM    Contact: Ext [phone]

## 2021-12-28 NOTE — PROGRESS NOTES
Pulmonology and Critical Care Progress Note    Subjective:       Patient seen and examined  Overnight events noted    Over the course of the day she has been doing fairly well on 100% high flow oxygen, 60 L flow. However later on this evening she was desaturating persistently. I was called. When I evaluated the patient she was not in any distress but her saturations were persistently in the 70 and 80% range. Heart rate was very variable. I ordered a blood gas and the EKG. She was placed on BiPAP. I have ordered her to be transferred to the ICU since she remains a full code. ABGs and chest x-ray reviewed, discussed below. Review of Systems:  Review of systems not obtained due to patient factors.     Current Facility-Administered Medications   Medication Dose Route Frequency Provider Last Rate Last Admin    dextrose 5 % - 0.45% NaCl infusion  50 mL/hr IntraVENous CONTINUOUS Clenton Lisa, NP 50 mL/hr at 12/28/21 1444 50 mL/hr at 12/28/21 1444    TPN ADULT-AA 4.25% D 5% PERIPHERAL   IntraVENous CONTINUOUS Clenton Lisa NP 83 mL/hr at 12/27/21 2247 New Bag at 12/27/21 2247    vancomycin (VANCOCIN) 1,250 mg in 0.9% sodium chloride 250 mL (VIAL-MATE)  1,250 mg IntraVENous Q24H Lester Adler  mL/hr at 12/27/21 2243 1,250 mg at 12/27/21 2243    [START ON 12/29/2021] Vancomycin - Trough to be drawn prior to dose 12/29 @ 2000   Other ONCE Percy Horton MD        piperacillin-tazobactam (ZOSYN) 3.375 g in 0.9% sodium chloride (MBP/ADV) 100 mL MBP  3.375 g IntraVENous Q8H Corona Abraham MD 25 mL/hr at 12/28/21 1018 3.375 g at 12/28/21 1018    dexamethasone (DECADRON) 4 mg/mL injection 6 mg  6 mg IntraVENous Q8H Angela Amezquita PA   6 mg at 12/28/21 1444    VANCOMYCIN INFORMATION NOTE   Other Rx Dosing/Monitoring Yumiko Prince MD        benzocaine-menthoL (CEPACOL) lozenge 1 Lozenge  1 Lozenge Mucous Membrane PRN Sohail Amezquita, PA        cetirizine (ZYRTEC) tablet 5 mg  5 mg Oral DAILY PRN OSMIN Patton        benzonatate (TESSALON) capsule 200 mg  200 mg Oral TID Ana Laura Galindo PA-C   200 mg at 12/27/21 2255    guaiFENesin-codeine (ROBITUSSIN AC) 100-10 mg/5 mL solution 5 mL  5 mL Oral Q6H PRN Jaswinder Galindo PA-C   5 mL at 12/26/21 2247    amLODIPine (NORVASC) tablet 5 mg  5 mg Oral DAILY Ana Laura Galindo PA-C   5 mg at 12/27/21 0931    zinc oxide-white petrolatum 17-57 % topical paste   Topical TID Wilber Poon PA-C   Given at 12/28/21 1019    sodium chloride (NS) flush 5-40 mL  5-40 mL IntraVENous Q8H John Oro MD   10 mL at 12/28/21 5324    sodium chloride (NS) flush 5-40 mL  5-40 mL IntraVENous PRN John Oro MD        acetaminophen (TYLENOL) tablet 650 mg  650 mg Oral Q6H PRN John Oro MD   650 mg at 12/23/21 2233    Or    acetaminophen (TYLENOL) suppository 650 mg  650 mg Rectal Q6H PRN John Oro MD        ondansetron (ZOFRAN ODT) tablet 4 mg  4 mg Oral Q6H PRN John Oro MD        Or    ondansetron (ZOFRAN) injection 4 mg  4 mg IntraVENous Q6H PRN John Oro MD        enoxaparin (LOVENOX) injection 30 mg  30 mg SubCUTAneous Q24H John Oro MD   30 mg at 12/28/21 0606    cholecalciferol (VITAMIN D3) (1000 Units /25 mcg) tablet 2,000 Units  2,000 Units Oral DAILY John Oro MD   2,000 Units at 12/27/21 0931    aspirin delayed-release tablet 81 mg  81 mg Oral DAILY John Oro MD   81 mg at 12/27/21 0931    pantoprazole (PROTONIX) tablet 40 mg  40 mg Oral ACB John Oro MD   40 mg at 12/27/21 0931    clopidogreL (PLAVIX) tablet 75 mg  75 mg Oral DAILY John Oro MD   75 mg at 12/27/21 0931    mirtazapine (REMERON) tablet 15 mg  15 mg Oral QHS John Oro MD   15 mg at 12/26/21 2246    melatonin tablet 10 mg  10 mg Oral QHS John Oro MD   10 mg at 12/27/21 0977    levothyroxine (SYNTHROID) tablet 75 mcg  75 mcg Oral ACB Alona Marie MD   75 mcg at 21 0931    zinc sulfate (ZINCATE) 50 mg zinc (220 mg) capsule 1 Capsule  1 Capsule Oral DAILY Ana Laura Galindo PA-C   1 Capsule at 21 0931    ascorbic acid (vitamin C) (VITAMIN C) tablet 250 mg  250 mg Oral BID Marizol Galindo PA-C   250 mg at 216    hydrALAZINE (APRESOLINE) 20 mg/mL injection 20 mg  20 mg IntraVENous Q6H PRN Ana Laura Galindo PA-C                No Known Allergies        Objective:     Blood pressure 107/65, pulse (!) 53, temperature 97.9 °F (36.6 °C), resp. rate 26, height 5' (1.524 m), weight 65.7 kg (144 lb 13.5 oz), SpO2 95 %. Temp (24hrs), Av.9 °F (36.6 °C), Min:97.8 °F (36.6 °C), Max:97.9 °F (36.6 °C)      Intake and Output:  Current Shift: No intake/output data recorded. Last 3 Shifts:  1901 -  0700  In: .1 [I.V.:.1]  Out: 400 [Urine:400]    Physical Exam:    General: Lying in bed, no acute distress, on high flow oxygen but desaturating. She has a sitter at the bedside. Eye: Reactive, symmetric  Throat and Neck: Supple  Lung: Reduced air entry bilaterally with scattered crackles, more pronounced over the bases. Heart: S1+S2. No murmurs  Abdomen: soft, non-tender.  Bowel sounds normal. No masses; obese  Extremities: No edema  : Not done  Skin: No cyanosis  Neurologic: Grossly nonfocal    Lab/Data Review:  Recent Results (from the past 24 hour(s))   BLOOD GAS, ARTERIAL    Collection Time: 21  5:15 AM   Result Value Ref Range    pH 7.48 (H) 7.35 - 7.45      PCO2 30 (L) 35 - 45 mmHg    PO2 75 75 - 100 mmHg    O2 SAT 96 >95 %    BICARBONATE 24 22 - 26 mmol/L    BASE DEFICIT 0.3 0 - 2 mmol/L    O2 METHOD High Flow O2      O2 FLOW RATE 60.0 L/min    FIO2 100.0 %    EPAP/CPAP/PEEP 0      SITE Left Brachial      ESTEPHANIE'S TEST PASS     GLUCOSE, POC    Collection Time: 21  6:13 AM   Result Value Ref Range    Glucose (POC) 180 (H) 65 - 117 mg/dL    Performed by [employer] Adela    METABOLIC PANEL, BASIC    Collection Time: 12/28/21  5:15 PM   Result Value Ref Range    Sodium 145 136 - 145 mmol/L    Potassium 4.5 3.5 - 5.1 mmol/L    Chloride 115 (H) 97 - 108 mmol/L    CO2 24 21 - 32 mmol/L    Anion gap 6 5 - 15 mmol/L    Glucose 206 (H) 65 - 100 mg/dL    BUN 51 (H) 6 - 20 mg/dL    Creatinine 0.76 0.55 - 1.02 mg/dL    BUN/Creatinine ratio 67 (H) 12 - 20      GFR est AA >60 >60 ml/min/1.73m2    GFR est non-AA >60 >60 ml/min/1.73m2    Calcium 8.3 (L) 8.5 - 10.1 mg/dL   MAGNESIUM    Collection Time: 12/28/21  5:15 PM   Result Value Ref Range    Magnesium 2.8 (H) 1.6 - 2.4 mg/dL   PHOSPHORUS    Collection Time: 12/28/21  5:15 PM   Result Value Ref Range    Phosphorus 3.2 2.6 - 4.7 mg/dL     chest X-ray      XR CHEST PORT   Final Result   No significant interval change. XR CHEST PORT   Final Result   No significant change. Bilateral interstitial markings, consistent   with edema, pneumonitis, or possibly fibrosis. Mildly elevated right   hemidiaphragm. Stable appearance of cardiomediastinal silhouette, status post   [place]. Arthritic changes of bilateral shoulders. Cholecystectomy clips. Apical   pleural thickening. No pneumothorax. XR CHEST PORT   Final Result   1. Moderate diffuse bilateral airspace opacities, increased from the previous   exam.   2. Trace bilateral pleural effusions. XR CHEST PORT   Final Result   1. There has been are resolution of the airspace disease previously   demonstrated within the lungs. There is a milder degree residual groundglass   opacity and interstitial disease. The differential diagnosis would include   partially resolved interstitial and alveolar edema versus partially resolved   pneumonitis/aspiration pneumonia. I favor edema given the rapid interval   decrease in the airspace process. 2.  The patient is status post a previous transfemoral aortic valve replacement.       XR CHEST PORT   Final Result   Worsening bilateral airspace disease compatible with multi lobar pneumonia   and/or edema in the appropriate clinical setting. XR CHEST PORT   Final Result   Subtle bilateral pulmonary parenchymal opacities. Please see above. CT Results  (Last 48 hours)    None          Assessment:     1. Acute respiratory failure with hypoxia  2. COVID-19 pneumonia  3. History of dementia  4. Hypertension  5. Hypothyroidism  6. Dyslipidemia    Plan:     Patient admitted to the hospital  Will be watched here closely    Gradual decline in clinical status with increasing weakness  On high flow oxygen 60 L 100% FiO2  However this evening the patient had persistent desaturations. I was called. No distress. She is now placed on a BiPAP 12/8 and 100% oxygen. Discussed with RT. She has been able to wean down oxygen to 60%. Saturations are in the 90% range. EKG is pending. I will obtain a blood gas in about half an hour. Chest x-ray done this morning showed no change in bilateral interstitial opacities. Arterial blood gases done this morning showed 7.4 8/30/1975 on 100% high flow oxygen. The patient is a full code. I will transfer the patient to ICU. Speech input appreciated. Given her overall status they have recommended that she be n.p.o. However she is quite behind nutritionally. Will discuss with family regarding NG tube placement and initiation of tube feeds. The patient however has been started on TPN. We will repeat blood gas and chest x-ray in the morning    Patient is COVID-19 positive status  Continue dexamethasone  Remdesivir started 12/20/2021  Last D-dimer 12/23 was 0.3    Already on vancomycin  Zosyn for broader antibiotic coverage    Continue vitamin C and zinc  We will discontinue Tessalon Perles. Started TPN by primary service.     Continue blood pressure medications  Continue Synthroid  Continue Remeron    DVT and GI prophylaxis    Condition tenuous  Prognosis guarded  If declines will require intubation  Apparently she remains a full code. I discussed with her daughter Florinda Gonzalez, [phone] in detail. She was informed that the patient is being transferred to the ICU. She will talk with the other family members and if a decision is made regarding DNR she will inform the nurse in the ICU. Will update the family tomorrow again. Case discussed in detail with RN, RT, and care team   discussed with primary physician. Thank you for involving me in the care of this patient  I will follow with you closely during hospitalization    Time spent more than 30 minutes in direct patient care with no overlap reviewing results and records, decision-making, and answering questions.       Carrie Benton MD  [facility]  12/28/2021

## 2021-12-28 NOTE — PROGRESS NOTES
Day #7 of Vancomycin  Consult provided for this 80 y.o. female for indication of bacteremia. Antibiotic regimen:  Vancomycin + Zosyn  Concomitant nephrotoxic drugs: None  Frequency of BMP: Not scheduled    Recent Labs     21  1545 21  1845 21  1700   WBC 17.6* 16.1* 16.2*   CREA 0.78  --   --    BUN 44*  --   --      Est CrCl: ~35-40 ml/min; UO: 0.2 ml/kg/hr  Temp (24hrs), Av.5 °F (36.4 °C), Min:97.5 °F (36.4 °C), Max:97.6 °F (36.4 °C)    Cultures:    Blood: coagulase-negative Staphylococcus sp.  in 1/4 bottles    MRSA Swab: Not ordered, past first dose of vancomycin    Target range: AUC/BRENDAN 400-600    Recent level history:  Date/Time Dose & Interval Measured Level (mcg/mL) Associated AUC/BRENDAN    @ 0824 1250 mg x 1 1.8     @ 1545 1250 mg x 1, 750 mg q24h 10.3 334        Assessment/Plan:   Hypothermic yesterday, continued leukocytosis, CRP improved  SCr continues to trend down, clearance more rapid than predicted  Extrapolated AUC is subtherapeutic, will increase to 1250 mg q24h for predicted AUC of 535  Will check a level prior to dose  @   Antimicrobial stop date TBD

## 2021-12-28 NOTE — PROGRESS NOTES
Patient removed medications from mouth and threw them in the floor. Crushed medications and tried to give them to patient, and she spit medications onto her gown.

## 2021-12-29 NOTE — PROGRESS NOTES
Patient currently on OT caseload, however patient transferred to ICU from  due to medical decline. OT to hold at this time. Will need new OT evaluation order once pt medically stable for re-assessment. Thank you.

## 2021-12-29 NOTE — PROGRESS NOTES
I had a long discussion with multiple family members in the conference room. All of their questions and concerns were answered. The patient is a DNR. They are leaning towards comfort care but they are not sure at this time. They will continue further discussions amongst themselves. I spent approximately 30 minutes with the family members.

## 2021-12-29 NOTE — PROGRESS NOTES
Pulmonology and Critical Care Progress Note    Subjective:       Patient seen and examined  Overnight events noted    Patient was transferred over to the ICU last evening. She has been on BiPAP all along. 12/8 with 60% O2. Blood gases and chest x-ray are discussed below. She wakes up and tries to talk. She still has a sitter at the bedside. She is constantly reaching to the mask. Hemodynamically stable. Discussed with the RN. I have asked RT to give her a try off BiPAP on high flow oxygen again. If she does well we can then do a swallow evaluation again. Apparently she was never started on TPN. ABGs and chest x-ray reviewed, discussed below. Review of Systems:  Review of systems not obtained due to patient factors.     Current Facility-Administered Medications   Medication Dose Route Frequency Provider Last Rate Last Admin    TPN ADULT-AA 4.25% D 5% PERIPHERAL   IntraVENous CONTINUOUS Sly Martinez MD        vancomycin (VANCOCIN) 1,250 mg in 0.9% sodium chloride 250 mL (VIAL-MATE)  1,250 mg IntraVENous Q24H Vilma Wood  mL/hr at 12/28/21 2000 1,250 mg at 12/28/21 2000    Vancomycin - Trough to be drawn prior to dose 12/29 @ 2000   Other ONCE Sly Del Angel MD        piperacillin-tazobactam (ZOSYN) 3.375 g in 0.9% sodium chloride (MBP/ADV) 100 mL MBP  3.375 g IntraVENous Q8H oCrona Abraham MD 25 mL/hr at 12/29/21 0838 3.375 g at 12/29/21 0838    dexamethasone (DECADRON) 4 mg/mL injection 6 mg  6 mg IntraVENous Q8H Angela Amezquita PA   6 mg at 12/29/21 1346    VANCOMYCIN INFORMATION NOTE   Other Rx Dosing/Monitoring Samantha Prince MD        benzocaine-menthoL (CEPACOL) lozenge 1 Lozenge  1 Lozenge Mucous Membrane PRN Angela Amezquita PA        cetirizine (ZYRTEC) tablet 5 mg  5 mg Oral DAILY PRN OSMIN Centeno        benzonatate (TESSALON) capsule 200 mg  200 mg Oral TID Barb Allison PA-C   200 mg at 12/27/21 2659    guaiFENesin-codeine (ROBITUSSIN AC) 100-10 mg/5 mL solution 5 mL  5 mL Oral Q6H PRN Ana Laura Galindo PA-C   5 mL at 12/26/21 2247    amLODIPine (NORVASC) tablet 5 mg  5 mg Oral DAILY Ana Laura Galindo PA-C   5 mg at 12/27/21 0931    zinc oxide-white petrolatum 17-57 % topical paste   Topical TID [place] Molina PA-C   Given at 12/29/21 0843    sodium chloride (NS) flush 5-40 mL  5-40 mL IntraVENous Q8H Maria Posada MD   10 mL at 12/29/21 1347    sodium chloride (NS) flush 5-40 mL  5-40 mL IntraVENous PRN Maria Posada MD        acetaminophen (TYLENOL) tablet 650 mg  650 mg Oral Q6H PRN Maria Posada MD   650 mg at 12/23/21 2233    Or    acetaminophen (TYLENOL) suppository 650 mg  650 mg Rectal Q6H PRN Maria Posada MD        ondansetron (ZOFRAN ODT) tablet 4 mg  4 mg Oral Q6H PRN Maria Posada MD        Or    ondansetron (ZOFRAN) injection 4 mg  4 mg IntraVENous Q6H PRN Maria Posada MD        enoxaparin (LOVENOX) injection 30 mg  30 mg SubCUTAneous Q24H Maria Posada MD   30 mg at 12/28/21 0606    cholecalciferol (VITAMIN D3) (1000 Units /25 mcg) tablet 2,000 Units  2,000 Units Oral DAILY Maria Posada MD   2,000 Units at 12/27/21 0931    aspirin delayed-release tablet 81 mg  81 mg Oral DAILY Maria Posada MD   81 mg at 12/27/21 0931    pantoprazole (PROTONIX) tablet 40 mg  40 mg Oral ACB Maria Posada MD   40 mg at 12/27/21 0931    clopidogreL (PLAVIX) tablet 75 mg  75 mg Oral DAILY Maria Posada MD   75 mg at 12/27/21 0931    mirtazapine (REMERON) tablet 15 mg  15 mg Oral QHS Maria Posada MD   15 mg at 12/26/21 2246    melatonin tablet 10 mg  10 mg Oral QHS Maria Posada MD   10 mg at 12/27/21 2255    levothyroxine (SYNTHROID) tablet 75 mcg  75 mcg Oral ACB Maria Posada MD   75 mcg at 12/27/21 0931    zinc sulfate (ZINCATE) 50 mg zinc (220 mg) capsule 1 Capsule  1 Capsule Oral DAILY Ariana Maldonado PA-C   1 Capsule at 21 6034    ascorbic acid (vitamin C) (VITAMIN C) tablet 250 mg  250 mg Oral BID Ariana REBECCA Maldonado   250 mg at 21 1379    hydrALAZINE (APRESOLINE) 20 mg/mL injection 20 mg  20 mg IntraVENous Q6H PRN Ana Laura Galindo PA-C                No Known Allergies        Objective:     Blood pressure (!) 104/57, pulse 87, temperature 97.9 °F (36.6 °C), resp. rate 22, height 5' (1.524 m), weight 65.7 kg (144 lb 13.5 oz), SpO2 94 %. Temp (24hrs), Av.8 °F (36.6 °C), Min:97.6 °F (36.4 °C), Max:97.9 °F (36.6 °C)      Intake and Output:  Current Shift: No intake/output data recorded. Last 3 Shifts:  1901 -  0700  In: 6.1 [I.V.:.1]  Out: 1000 [Urine:1000]    Physical Exam:    General: Lying in bed, no acute distress, on BiPAP 60%. She has a sitter at the bedside. She is restless. Eye: Reactive, symmetric  Throat and Neck: Supple  Lung: Reduced air entry bilaterally with scattered crackles, more pronounced over the bases. Heart: S1+S2. No murmurs  Abdomen: soft, non-tender. Bowel sounds normal. No masses; obese  Extremities:  She has more edema of the right upper extremity. Also some edema of the ankles.    : Not done  Skin: No cyanosis  Neurologic: Grossly nonfocal    Lab/Data Review:  Recent Results (from the past 24 hour(s))   METABOLIC PANEL, BASIC    Collection Time: 21  5:15 PM   Result Value Ref Range    Sodium 145 136 - 145 mmol/L    Potassium 4.5 3.5 - 5.1 mmol/L    Chloride 115 (H) 97 - 108 mmol/L    CO2 24 21 - 32 mmol/L    Anion gap 6 5 - 15 mmol/L    Glucose 206 (H) 65 - 100 mg/dL    BUN 51 (H) 6 - 20 mg/dL    Creatinine 0.76 0.55 - 1.02 mg/dL    BUN/Creatinine ratio 67 (H) 12 - 20      GFR est AA >60 >60 ml/min/1.73m2    GFR est non-AA >60 >60 ml/min/1.73m2    Calcium 8.3 (L) 8.5 - 10.1 mg/dL   MAGNESIUM    Collection Time: 21  5:15 PM   Result Value Ref Range    Magnesium 2.8 (H) 1.6 - 2.4 mg/dL   PHOSPHORUS    Collection Time: 12/28/21  5:15 PM   Result Value Ref Range    Phosphorus 3.2 2.6 - 4.7 mg/dL   BLOOD GAS, ARTERIAL    Collection Time: 12/28/21  6:55 PM   Result Value Ref Range    pH 7.47 (H) 7.35 - 7.45      PCO2 30 (L) 35 - 45 mmHg    PO2 71 (L) 75 - 100 mmHg    O2 SAT 95 (L) >95 %    BICARBONATE 23 22 - 26 mmol/L    BASE DEFICIT 1.2 0 - 2 mmol/L    O2 METHOD Non-invasive ventilation      FIO2 60.0 %    MODE SPONT      SET RATE 14      EPAP/CPAP/PEEP 8      SITE Arterial Line      ESTEPHANIE'S TEST PASS     C REACTIVE PROTEIN, QT    Collection Time: 12/29/21  4:50 AM   Result Value Ref Range    C-Reactive protein 1.95 (H) 0.00 - 0.60 mg/dL   CBC WITH AUTOMATED DIFF    Collection Time: 12/29/21  4:50 AM   Result Value Ref Range    WBC 25.4 (H) 3.6 - 11.0 K/uL    RBC 3.57 (L) 3.80 - 5.20 M/uL    HGB 11.9 11.5 - 16.0 g/dL    HCT 35.0 35.0 - 47.0 %    MCV 98.0 80.0 - 99.0 FL    MCH 33.3 26.0 - 34.0 PG    MCHC 34.0 30.0 - 36.5 g/dL    RDW 14.6 (H) 11.5 - 14.5 %    PLATELET 41 (LL) 792 - 400 K/uL    NRBC 0.0 0.0  WBC    ABSOLUTE NRBC 0.00 0.00 - 0.01 K/uL    NEUTROPHILS 94 (H) 32 - 75 %    LYMPHOCYTES 1 (L) 12 - 49 %    MONOCYTES 2 (L) 5 - 13 %    EOSINOPHILS 0 0 - 7 %    BASOPHILS 0 0 - 1 %    IMMATURE GRANULOCYTES 3 (H) 0 - 0.5 %    ABS. NEUTROPHILS 24.0 (H) 1.8 - 8.0 K/UL    ABS. LYMPHOCYTES 0.3 (L) 0.8 - 3.5 K/UL    ABS. MONOCYTES 0.4 0.0 - 1.0 K/UL    ABS. EOSINOPHILS 0.0 0.0 - 0.4 K/UL    ABS. BASOPHILS 0.1 0.0 - 0.1 K/UL    ABS. IMM.  GRANS. 0.7 (H) 0.00 - 0.04 K/UL    DF AUTOMATED     D DIMER    Collection Time: 12/29/21  4:50 AM   Result Value Ref Range    D DIMER 3.37 (H) <0.50 ug/ml(FEU)   METABOLIC PANEL, BASIC    Collection Time: 12/29/21  4:50 AM   Result Value Ref Range    Sodium 145 136 - 145 mmol/L    Potassium 4.6 3.5 - 5.1 mmol/L    Chloride 115 (H) 97 - 108 mmol/L    CO2 24 21 - 32 mmol/L    Anion gap 6 5 - 15 mmol/L    Glucose 202 (H) 65 - 100 mg/dL    BUN 51 (H) 6 - 20 mg/dL    Creatinine 0.74 0.55 - 1.02 mg/dL    BUN/Creatinine ratio 69 (H) 12 - 20      GFR est AA >60 >60 ml/min/1.73m2    GFR est non-AA >60 >60 ml/min/1.73m2    Calcium 8.1 (L) 8.5 - 10.1 mg/dL   MAGNESIUM    Collection Time: 12/29/21  4:50 AM   Result Value Ref Range    Magnesium 2.9 (H) 1.6 - 2.4 mg/dL   BLOOD GAS, ARTERIAL    Collection Time: 12/29/21  5:10 AM   Result Value Ref Range    pH 7.46 (H) 7.35 - 7.45      PCO2 31 (L) 35 - 45 mmHg    PO2 82 75 - 100 mmHg    O2 SAT 97 >95 %    BICARBONATE 23 22 - 26 mmol/L    BASE DEFICIT 1.5 0 - 2 mmol/L    O2 METHOD BiPAP      FIO2 60.0 %    EPAP/CPAP/PEEP 8      SITE Left Brachial      ESTEPHANIE'S TEST PASS       chest X-ray      XR CHEST PORT   Final Result      XR CHEST PORT   Final Result   No significant interval change. XR CHEST PORT   Final Result   No significant change. Bilateral interstitial markings, consistent   with edema, pneumonitis, or possibly fibrosis. Mildly elevated right   hemidiaphragm. Stable appearance of cardiomediastinal silhouette, status post   [place]. Arthritic changes of bilateral shoulders. Cholecystectomy clips. Apical   pleural thickening. No pneumothorax. XR CHEST PORT   Final Result   1. Moderate diffuse bilateral airspace opacities, increased from the previous   exam.   2. Trace bilateral pleural effusions. XR CHEST PORT   Final Result   1. There has been are resolution of the airspace disease previously   demonstrated within the lungs. There is a milder degree residual groundglass   opacity and interstitial disease. The differential diagnosis would include   partially resolved interstitial and alveolar edema versus partially resolved   pneumonitis/aspiration pneumonia. I favor edema given the rapid interval   decrease in the airspace process. 2.  The patient is status post a previous transfemoral aortic valve replacement.       XR CHEST PORT   Final Result   Worsening bilateral airspace disease compatible with multi lobar pneumonia   and/or edema in the appropriate clinical setting. XR CHEST PORT   Final Result   Subtle bilateral pulmonary parenchymal opacities. Please see above. CT Results  (Last 48 hours)    None          Assessment:     1. Acute respiratory failure with hypoxia  2. COVID-19 pneumonia  3. History of dementia  4. Hypertension  5. Hypothyroidism  6. Dyslipidemia    Plan:     Patient admitted to the hospital  Will be watched here closely    Gradual decline in clinical status with increasing weakness  Patient had been on high flow oxygen 60 L 100% FiO2  However 12/28 evening the patient had persistent desaturations. I was called. No distress. She was placed on a BiPAP 12/8 and 100% oxygen and transferred to the ICU. This morning she remains on BiPAP 12/8 with 60% O2. Blood gases done this morning show 7.4 6/31/82 on 60% BiPAP. Chest x-ray done yesterday and today shows diffuse increased interstitial markings/infiltrates. No significant change. She wakes up and tries to talk. Restless. Reaching for the mask. She has a sitter. I will try to give her a break of BiPAP on high flow oxygen. If she does well we will do a swallow evaluation. Arterial blood gases done 12/28 morning showed 7.4 8/30/1975 on 100% high flow oxygen. The patient is DNR now. Speech input appreciated. Given her overall status they have recommended that she be n.p.o. However she is quite behind nutritionally. Will discuss with family regarding NG tube placement and initiation of tube feeds. The patient however has an order for TPN. We will repeat blood gas and chest x-ray in the morning    Patient is COVID-19 positive status  Continue dexamethasone  Remdesivir started 12/20/2021  Last D-dimer 12/23 was 0.3    Already on vancomycin  Zosyn for broader antibiotic coverage    Continue vitamin C and zinc  We will discontinue Tessalon Perles.     Continue blood pressure medications  Continue Synthroid  Continue Remeron    DVT and GI prophylaxis    Condition tenuous  Prognosis guarded    I discussed with her daughter To Huang, [phone] in detail. The patient is now a DNR. They would like to see the patient in person before making a decision regarding comfort care. This is not unreasonable. Discussed with the nursing supervisor. Two persons are allowed at a time. When she comes in person we will also do a family meeting with primary attending who was also notified. Case discussed in detail with RN, RT, and care team   discussed with primary physician. Thank you for involving me in the care of this patient  I will follow with you closely during hospitalization    Time spent more than 50 minutes in direct patient care with no overlap reviewing results and records, decision-making, and answering questions.       Alicia Dai MD  [facility]  12/29/2021

## 2021-12-29 NOTE — PROGRESS NOTES
Patient currently on PT caseload however patient transferred to ICU from [facility]  due to medical decline. Will need new PT eval order once pt medically stable for reassessment. Thank you.

## 2021-12-29 NOTE — PROGRESS NOTES
Hospitalist Progress Note    Subjective:   Daily Progress Note: 12/29/2021 10:02 AM    Patient examined with 1:1 sitter at bedside quietly resting, appears ill and lethargic. She is opening her eyes more today, not communicating much. Staff reports she contShe continues to have poor oral intake. Patient remains on high flow 60L 100%.       Current Facility-Administered Medications   Medication Dose Route Frequency    TPN ADULT-AA 4.25% D 5% PERIPHERAL   IntraVENous CONTINUOUS    dexmedeTOMidine (PRECEDEX) 400 mcg in 0.9% sodium chloride (MBP/ADV) 100 mL MBP  0.1-1.5 mcg/kg/hr IntraVENous TITRATE    vancomycin (VANCOCIN) 1,250 mg in 0.9% sodium chloride 250 mL (VIAL-MATE)  1,250 mg IntraVENous Q24H    Vancomycin - Trough to be drawn prior to dose 12/29 @ 2000   Other ONCE    piperacillin-tazobactam (ZOSYN) 3.375 g in 0.9% sodium chloride (MBP/ADV) 100 mL MBP  3.375 g IntraVENous Q8H    dexamethasone (DECADRON) 4 mg/mL injection 6 mg  6 mg IntraVENous Q8H    VANCOMYCIN INFORMATION NOTE   Other Rx Dosing/Monitoring    benzocaine-menthoL (CEPACOL) lozenge 1 Lozenge  1 Lozenge Mucous Membrane PRN    cetirizine (ZYRTEC) tablet 5 mg  5 mg Oral DAILY PRN    guaiFENesin-codeine (ROBITUSSIN AC) 100-10 mg/5 mL solution 5 mL  5 mL Oral Q6H PRN    amLODIPine (NORVASC) tablet 5 mg  5 mg Oral DAILY    zinc oxide-white petrolatum 17-57 % topical paste   Topical TID    sodium chloride (NS) flush 5-40 mL  5-40 mL IntraVENous Q8H    sodium chloride (NS) flush 5-40 mL  5-40 mL IntraVENous PRN    acetaminophen (TYLENOL) tablet 650 mg  650 mg Oral Q6H PRN    Or    acetaminophen (TYLENOL) suppository 650 mg  650 mg Rectal Q6H PRN    ondansetron (ZOFRAN ODT) tablet 4 mg  4 mg Oral Q6H PRN    Or    ondansetron (ZOFRAN) injection 4 mg  4 mg IntraVENous Q6H PRN    enoxaparin (LOVENOX) injection 30 mg  30 mg SubCUTAneous Q24H    cholecalciferol (VITAMIN D3) (1000 Units /25 mcg) tablet 2,000 Units  2,000 Units Oral DAILY    aspirin delayed-release tablet 81 mg  81 mg Oral DAILY    pantoprazole (PROTONIX) tablet 40 mg  40 mg Oral ACB    clopidogreL (PLAVIX) tablet 75 mg  75 mg Oral DAILY    mirtazapine (REMERON) tablet 15 mg  15 mg Oral QHS    melatonin tablet 10 mg  10 mg Oral QHS    levothyroxine (SYNTHROID) tablet 75 mcg  75 mcg Oral ACB    zinc sulfate (ZINCATE) 50 mg zinc (220 mg) capsule 1 Capsule  1 Capsule Oral DAILY    ascorbic acid (vitamin C) (VITAMIN C) tablet 250 mg  250 mg Oral BID    hydrALAZINE (APRESOLINE) 20 mg/mL injection 20 mg  20 mg IntraVENous Q6H PRN        Review of Systems  Review of Systems   Unable to perform ROS: Mental status change (limited due to lethargy and mental status)   Constitutional: Negative. HENT:        [place]   Respiratory: Positive for shortness of breath. All other systems reviewed and are negative.       Objective:     Visit Vitals  BP (!) 102/52   Pulse 82   Temp 97.8 °F (36.6 °C)   Resp (!) 32   Ht 5' (1.524 m)   Wt 71.1 kg (156 lb 12 oz)   SpO2 92%   BMI 30.61 kg/m²    O2 Flow Rate (L/min): 60 l/min O2 Device: Hi flow nasal cannula    Temp (24hrs), Av.8 °F (36.6 °C), Min:97.6 °F (36.4 °C), Max:98 °F (36.7 °C)       07 - 0  In: 4413 [I.V.:1563]  Out: 600 [Urine:600]  1901 -  0700  In: 2036.1 [I.V.:.1]  Out: 1000 [Urine:1000]    Recent Results (from the past 24 hour(s))   METABOLIC PANEL, BASIC    Collection Time: 21  5:15 PM   Result Value Ref Range    Sodium 145 136 - 145 mmol/L    Potassium 4.5 3.5 - 5.1 mmol/L    Chloride 115 (H) 97 - 108 mmol/L    CO2 24 21 - 32 mmol/L    Anion gap 6 5 - 15 mmol/L    Glucose 206 (H) 65 - 100 mg/dL    BUN 51 (H) 6 - 20 mg/dL    Creatinine 0.76 0.55 - 1.02 mg/dL    BUN/Creatinine ratio 67 (H) 12 - 20      GFR est AA >60 >60 ml/min/1.73m2    GFR est non-AA >60 >60 ml/min/1.73m2    Calcium 8.3 (L) 8.5 - 10.1 mg/dL   MAGNESIUM    Collection Time: 21  5:15 PM   Result Value Ref Range    Magnesium 2.8 (H) 1.6 - 2.4 mg/dL   PHOSPHORUS    Collection Time: 12/28/21  5:15 PM   Result Value Ref Range    Phosphorus 3.2 2.6 - 4.7 mg/dL   BLOOD GAS, ARTERIAL    Collection Time: 12/28/21  6:55 PM   Result Value Ref Range    pH 7.47 (H) 7.35 - 7.45      PCO2 30 (L) 35 - 45 mmHg    PO2 71 (L) 75 - 100 mmHg    O2 SAT 95 (L) >95 %    BICARBONATE 23 22 - 26 mmol/L    BASE DEFICIT 1.2 0 - 2 mmol/L    O2 METHOD Non-invasive ventilation      FIO2 60.0 %    MODE SPONT      SET RATE 14      EPAP/CPAP/PEEP 8      SITE Arterial Line      ESTEPHANIE'S TEST PASS     C REACTIVE PROTEIN, QT    Collection Time: 12/29/21  4:50 AM   Result Value Ref Range    C-Reactive protein 1.95 (H) 0.00 - 0.60 mg/dL   CBC WITH AUTOMATED DIFF    Collection Time: 12/29/21  4:50 AM   Result Value Ref Range    WBC 25.4 (H) 3.6 - 11.0 K/uL    RBC 3.57 (L) 3.80 - 5.20 M/uL    HGB 11.9 11.5 - 16.0 g/dL    HCT 35.0 35.0 - 47.0 %    MCV 98.0 80.0 - 99.0 FL    MCH 33.3 26.0 - 34.0 PG    MCHC 34.0 30.0 - 36.5 g/dL    RDW 14.6 (H) 11.5 - 14.5 %    PLATELET 41 (LL) 343 - 400 K/uL    NRBC 0.0 0.0  WBC    ABSOLUTE NRBC 0.00 0.00 - 0.01 K/uL    NEUTROPHILS 94 (H) 32 - 75 %    LYMPHOCYTES 1 (L) 12 - 49 %    MONOCYTES 2 (L) 5 - 13 %    EOSINOPHILS 0 0 - 7 %    BASOPHILS 0 0 - 1 %    IMMATURE GRANULOCYTES 3 (H) 0 - 0.5 %    ABS. NEUTROPHILS 24.0 (H) 1.8 - 8.0 K/UL    ABS. LYMPHOCYTES 0.3 (L) 0.8 - 3.5 K/UL    ABS. MONOCYTES 0.4 0.0 - 1.0 K/UL    ABS. EOSINOPHILS 0.0 0.0 - 0.4 K/UL    ABS. BASOPHILS 0.1 0.0 - 0.1 K/UL    ABS. IMM.  GRANS. 0.7 (H) 0.00 - 0.04 K/UL    DF AUTOMATED     D DIMER    Collection Time: 12/29/21  4:50 AM   Result Value Ref Range    D DIMER 3.37 (H) <0.50 ug/ml(FEU)   METABOLIC PANEL, BASIC    Collection Time: 12/29/21  4:50 AM   Result Value Ref Range    Sodium 145 136 - 145 mmol/L    Potassium 4.6 3.5 - 5.1 mmol/L    Chloride 115 (H) 97 - 108 mmol/L    CO2 24 21 - 32 mmol/L    Anion gap 6 5 - 15 mmol/L    Glucose 202 (H) 65 - 100 mg/dL    BUN 51 (H) 6 - 20 mg/dL    Creatinine 0.74 0.55 - 1.02 mg/dL    BUN/Creatinine ratio 69 (H) 12 - 20      GFR est AA >60 >60 ml/min/1.73m2    GFR est non-AA >60 >60 ml/min/1.73m2    Calcium 8.1 (L) 8.5 - 10.1 mg/dL   MAGNESIUM    Collection Time: 12/29/21  4:50 AM   Result Value Ref Range    Magnesium 2.9 (H) 1.6 - 2.4 mg/dL   BLOOD GAS, ARTERIAL    Collection Time: 12/29/21  5:10 AM   Result Value Ref Range    pH 7.46 (H) 7.35 - 7.45      PCO2 31 (L) 35 - 45 mmHg    PO2 82 75 - 100 mmHg    O2 SAT 97 >95 %    BICARBONATE 23 22 - 26 mmol/L    BASE DEFICIT 1.5 0 - 2 mmol/L    O2 METHOD BiPAP      FIO2 60.0 %    EPAP/CPAP/PEEP 8      SITE Left Brachial      ESTEPHANIE'S TEST PASS          XR CHEST PORT   Final Result      XR CHEST PORT   Final Result   No significant interval change. XR CHEST PORT   Final Result   No significant change. Bilateral interstitial markings, consistent   with edema, pneumonitis, or possibly fibrosis. Mildly elevated right   hemidiaphragm. Stable appearance of cardiomediastinal silhouette, status post   [place]. Arthritic changes of bilateral shoulders. Cholecystectomy clips. Apical   pleural thickening. No pneumothorax. XR CHEST PORT   Final Result   1. Moderate diffuse bilateral airspace opacities, increased from the previous   exam.   2. Trace bilateral pleural effusions. XR CHEST PORT   Final Result   1. There has been are resolution of the airspace disease previously   demonstrated within the lungs. There is a milder degree residual groundglass   opacity and interstitial disease. The differential diagnosis would include   partially resolved interstitial and alveolar edema versus partially resolved   pneumonitis/aspiration pneumonia. I favor edema given the rapid interval   decrease in the airspace process. 2.  The patient is status post a previous transfemoral aortic valve replacement.       XR CHEST PORT   Final Result   Worsening bilateral airspace disease compatible with multi lobar pneumonia   and/or edema in the appropriate clinical setting. XR CHEST PORT   Final Result   Subtle bilateral pulmonary parenchymal opacities. Please see above. PHYSICAL EXAM:    Physical Exam  Vitals and nursing note reviewed. Constitutional:       Comments: Lethargic, weak   HENT:      Head: Normocephalic and atraumatic. Ears:      Comments: [employer] hearing aides present bilaterally  Cardiovascular:      Rate and Rhythm: Normal rate and regular rhythm. Pulmonary:      Effort: Respiratory distress present. Breath sounds: No wheezing. Comments: High flow 60L 100%  Musculoskeletal:      Right lower leg: No edema. Left lower leg: No edema. Skin:     General: Skin is warm. Capillary Refill: Capillary refill takes less than 2 seconds. Neurological:      Mental Status: She is disoriented.    Psychiatric:      Comments: Lethargic, weak        Data Review    Recent Results (from the past 24 hour(s))   METABOLIC PANEL, BASIC    Collection Time: 12/28/21  5:15 PM   Result Value Ref Range    Sodium 145 136 - 145 mmol/L    Potassium 4.5 3.5 - 5.1 mmol/L    Chloride 115 (H) 97 - 108 mmol/L    CO2 24 21 - 32 mmol/L    Anion gap 6 5 - 15 mmol/L    Glucose 206 (H) 65 - 100 mg/dL    BUN 51 (H) 6 - 20 mg/dL    Creatinine 0.76 0.55 - 1.02 mg/dL    BUN/Creatinine ratio 67 (H) 12 - 20      GFR est AA >60 >60 ml/min/1.73m2    GFR est non-AA >60 >60 ml/min/1.73m2    Calcium 8.3 (L) 8.5 - 10.1 mg/dL   MAGNESIUM    Collection Time: 12/28/21  5:15 PM   Result Value Ref Range    Magnesium 2.8 (H) 1.6 - 2.4 mg/dL   PHOSPHORUS    Collection Time: 12/28/21  5:15 PM   Result Value Ref Range    Phosphorus 3.2 2.6 - 4.7 mg/dL   BLOOD GAS, ARTERIAL    Collection Time: 12/28/21  6:55 PM   Result Value Ref Range    pH 7.47 (H) 7.35 - 7.45      PCO2 30 (L) 35 - 45 mmHg    PO2 71 (L) 75 - 100 mmHg    O2 SAT 95 (L) >95 %    BICARBONATE 23 22 - 26 mmol/L    BASE DEFICIT 1.2 0 - 2 mmol/L    O2 METHOD Non-invasive ventilation      FIO2 60.0 %    MODE SPONT      SET RATE 14      EPAP/CPAP/PEEP 8      SITE Arterial Line      ESTEPHANIE'S TEST PASS     C REACTIVE PROTEIN, QT    Collection Time: 12/29/21  4:50 AM   Result Value Ref Range    C-Reactive protein 1.95 (H) 0.00 - 0.60 mg/dL   CBC WITH AUTOMATED DIFF    Collection Time: 12/29/21  4:50 AM   Result Value Ref Range    WBC 25.4 (H) 3.6 - 11.0 K/uL    RBC 3.57 (L) 3.80 - 5.20 M/uL    HGB 11.9 11.5 - 16.0 g/dL    HCT 35.0 35.0 - 47.0 %    MCV 98.0 80.0 - 99.0 FL    MCH 33.3 26.0 - 34.0 PG    MCHC 34.0 30.0 - 36.5 g/dL    RDW 14.6 (H) 11.5 - 14.5 %    PLATELET 41 (LL) 701 - 400 K/uL    NRBC 0.0 0.0  WBC    ABSOLUTE NRBC 0.00 0.00 - 0.01 K/uL    NEUTROPHILS 94 (H) 32 - 75 %    LYMPHOCYTES 1 (L) 12 - 49 %    MONOCYTES 2 (L) 5 - 13 %    EOSINOPHILS 0 0 - 7 %    BASOPHILS 0 0 - 1 %    IMMATURE GRANULOCYTES 3 (H) 0 - 0.5 %    ABS. NEUTROPHILS 24.0 (H) 1.8 - 8.0 K/UL    ABS. LYMPHOCYTES 0.3 (L) 0.8 - 3.5 K/UL    ABS. MONOCYTES 0.4 0.0 - 1.0 K/UL    ABS. EOSINOPHILS 0.0 0.0 - 0.4 K/UL    ABS. BASOPHILS 0.1 0.0 - 0.1 K/UL    ABS. IMM.  GRANS. 0.7 (H) 0.00 - 0.04 K/UL    DF AUTOMATED     D DIMER    Collection Time: 12/29/21  4:50 AM   Result Value Ref Range    D DIMER 3.37 (H) <0.50 ug/ml(FEU)   METABOLIC PANEL, BASIC    Collection Time: 12/29/21  4:50 AM   Result Value Ref Range    Sodium 145 136 - 145 mmol/L    Potassium 4.6 3.5 - 5.1 mmol/L    Chloride 115 (H) 97 - 108 mmol/L    CO2 24 21 - 32 mmol/L    Anion gap 6 5 - 15 mmol/L    Glucose 202 (H) 65 - 100 mg/dL    BUN 51 (H) 6 - 20 mg/dL    Creatinine 0.74 0.55 - 1.02 mg/dL    BUN/Creatinine ratio 69 (H) 12 - 20      GFR est AA >60 >60 ml/min/1.73m2    GFR est non-AA >60 >60 ml/min/1.73m2    Calcium 8.1 (L) 8.5 - 10.1 mg/dL   MAGNESIUM    Collection Time: 12/29/21  4:50 AM   Result Value Ref Range    Magnesium 2.9 (H) 1.6 - 2.4 mg/dL   BLOOD GAS, ARTERIAL    Collection Time: 12/29/21  5:10 AM   Result Value Ref Range    pH 7.46 (H) 7.35 - 7.45      PCO2 31 (L) 35 - 45 mmHg    PO2 82 75 - 100 mmHg    O2 SAT 97 >95 %    BICARBONATE 23 22 - 26 mmol/L    BASE DEFICIT 1.5 0 - 2 mmol/L    O2 METHOD BiPAP      FIO2 60.0 %    EPAP/CPAP/PEEP 8      SITE Left Brachial      ESTEPHANIE'S TEST PASS          Assessment/Plan:     Principal Problem:    Pneumonia due to COVID-19 virus (12/17/2021)    Active Problems:    Essential hypertension (8/22/2017)      Acquired hypothyroidism (8/22/2017)      Hypoxia (12/17/2021)      Dementia without behavioral disturbance ([facility] [address] (12/20/2021)      Acute respiratory failure with hypoxia ([facility] [address] (12/20/2021)        Hospital Course:    Sandeep Mccann is a 51-year-old female with a history of dementia, hypertension, hypothyroidism, and hyperlipidemia from [place] [address] that presented to the emergency room on 12/17/2021 for a 4-day history of cough. She was exposed to Covid. Rapid covid positive. Chest x-ray 12/17 shows signs concerning for pneumonia. Patient admitted for further Covid 19 work-up. Significant blood work for hypokalemia and creatinine of 1.53. Patient was placed on IV Decadron and azithromycin. Patient found to have elevated pro-Jose and LD. Pulmonology consulted. Elevated CRP. D-dimer normal. ABG 7.44/29/61 on 12/19 on nasal canula. Chest x-ray from 12/20 showing worsening bilateral airspace disease compatible with multilobar pneumonia and/or edema. Remdesivir started on 12/20/2021.12/17 blood: staphylococcus, coagulase negative will start on vancomycin. CXR 12/22 demonstrates resolution of the airspace disease previouslydemonstrated within the lungs. There is a milder degree residual ground glass opacity and interstitial disease. Continue IV vancomycin and zosyn. ABg 7.45/33/24. Repeat d-dimer is . 33. Speech recommending diet downgrade to minced and moist 5 with mildly thick liquids with 1:1 assistance with ALL PO intake. Repeat cxr shows bilateral diffuse airspace opacities.   12/25 jesus flow increased to 60 L 100%. Continue TPN and lipids. Continue vancomycin, Zosyn, vitamin C, vitamin D, zinc, Decadron 6 mg every 8 hours. 12/26 repeat cxr shows persistent pneumonia. Acute hypoxic respiratory failure secondary to Covid pneumonia   Superimposed aspiration PNA? Infection markers trending downward  On vancomycin, zosyn, vitamin C, vitamin D, and zinc  S/p treatment with remdesivir  IV Decadron from 6mg q8hrs  Remains on high flow 60L 100%  Tessalon, cough syrup, and lozenges as needed  12/17 blood: staphylococcus, coagulase negative, final  One-to-one sitter for safety  Speech therapy following  Pulmonology following    Hypertension  BP at goal  Continue norvasc 5mg  Hydralazine 20mg IV q6hrs PRN    Hypothyroidism  Continue with Synthroid 75mcg    Dementia:  Continue Remeron 15mg qhs  Continue 1:1 sitter for safety  UA unremarkable    Poor oral intake:  Secondary to covid pneumonia  Continue tpn  RD following    DVT Prophylaxis: lovenox  GI Prophylaxis: protonix  Discharge and disposition barriers:   >72 hr  Continue  IV decadron, antibiotics  Continue tpn  Continue high flow, wean as tolerated    Rigo Mathew, daughter [phone]     Treatment plan discussed with Interdisciplinary team in detail. Overall prognosis remains guarded. Daughter Rigo Mathew updated on overall condition. She continues to requests to see her mother. I informed her the patient still remains symptomatic with high flow oxygen that increases the risk for Aerosil contamination. She reports the CDC guidelines. I've informed her I am aware of the guidelines which are recommendations; however, for the patient safety and theirs she can not have visitors. She reports that her and the siblings recently had covid within the last 3 months and are unlikely to contract it again in such short time. I explained to her that is even more reason for us to maintain strict no visitation policy until the patient is making a recovery.  She feels that she would be able to get the patient to eat if she was present. Plan for meeting to discuss end of life care and quality of life. Care Plan discussed with: Interdisciplinary team    Total time spent with patient: >45 minutes.

## 2021-12-29 NOTE — PROGRESS NOTES
Was paged that pulmonary talked with family, patient is now a DNR. Pulmonary requesting that I change the order.   The order has been changed to DNR

## 2021-12-29 NOTE — PROGRESS NOTES
Attempted to see patient, however currently on Bipap. Nsg to switch over to HFNC. TPN has not yet been initiated. Will f/u when patient placed on HFNC. You can access the FollowMyHealth Patient Portal offered by [facility] by registering at the following website: [email or web address]. By joining [employer]’s FollowMyHealth portal, you will also be able to view your health information using other applications (apps) compatible with our system.

## 2021-12-30 NOTE — PROGRESS NOTES
Hospitalist Progress Note    Subjective:   Daily Progress Note: 12/30/2021 10:02 AM    Patient examined with 1:1 sitter at bedside quietly resting, appears ill and lethargic. She is opening her eyes more today, not communicating much. Staff reports she contShe continues to have poor oral intake. Patient remains on high flow 60L 100%.       Current Facility-Administered Medications   Medication Dose Route Frequency    pantoprazole (PROTONIX) granules for oral suspension 40 mg  40 mg Per NG tube ACB    aspirin chewable tablet 81 mg  81 mg Per NG tube DAILY    TPN ADULT-AA 4.25% D 5% PERIPHERAL   IntraVENous CONTINUOUS    dexmedeTOMidine (PRECEDEX) 400 mcg in 0.9% sodium chloride (MBP/ADV) 100 mL MBP  0.1-1.5 mcg/kg/hr IntraVENous TITRATE    NOREPINephrine (LEVOPHED) 8 mg in 0.9% NS 250ml infusion  0.5-16 mcg/min IntraVENous TITRATE    vancomycin (VANCOCIN) 1,250 mg in 0.9% sodium chloride 250 mL (VIAL-MATE)  1,250 mg IntraVENous Q24H    piperacillin-tazobactam (ZOSYN) 3.375 g in 0.9% sodium chloride (MBP/ADV) 100 mL MBP  3.375 g IntraVENous Q8H    dexamethasone (DECADRON) 4 mg/mL injection 6 mg  6 mg IntraVENous Q8H    VANCOMYCIN INFORMATION NOTE   Other Rx Dosing/Monitoring    benzocaine-menthoL (CEPACOL) lozenge 1 Lozenge  1 Lozenge Mucous Membrane PRN    cetirizine (ZYRTEC) tablet 5 mg  5 mg Oral DAILY PRN    guaiFENesin-codeine (ROBITUSSIN AC) 100-10 mg/5 mL solution 5 mL  5 mL Oral Q6H PRN    amLODIPine (NORVASC) tablet 5 mg  5 mg Oral DAILY    zinc oxide-white petrolatum 17-57 % topical paste   Topical TID    sodium chloride (NS) flush 5-40 mL  5-40 mL IntraVENous Q8H    sodium chloride (NS) flush 5-40 mL  5-40 mL IntraVENous PRN    acetaminophen (TYLENOL) tablet 650 mg  650 mg Oral Q6H PRN    Or    acetaminophen (TYLENOL) suppository 650 mg  650 mg Rectal Q6H PRN    ondansetron (ZOFRAN ODT) tablet 4 mg  4 mg Oral Q6H PRN    Or    ondansetron (ZOFRAN) injection 4 mg  4 mg IntraVENous Q6H PRN  enoxaparin (LOVENOX) injection 30 mg  30 mg SubCUTAneous Q24H    cholecalciferol (VITAMIN D3) (1000 Units /25 mcg) tablet 2,000 Units  2,000 Units Oral DAILY    clopidogreL (PLAVIX) tablet 75 mg  75 mg Oral DAILY    mirtazapine (REMERON) tablet 15 mg  15 mg Oral QHS    melatonin tablet 10 mg  10 mg Oral QHS    levothyroxine (SYNTHROID) tablet 75 mcg  75 mcg Oral ACB    zinc sulfate (ZINCATE) 50 mg zinc (220 mg) capsule 1 Capsule  1 Capsule Oral DAILY    ascorbic acid (vitamin C) (VITAMIN C) tablet 250 mg  250 mg Oral BID    hydrALAZINE (APRESOLINE) 20 mg/mL injection 20 mg  20 mg IntraVENous Q6H PRN        Review of Systems  Review of Systems   Unable to perform ROS: Mental status change (limited due to lethargy and mental status)   Constitutional: Negative. HENT:        [place]   Respiratory: Positive for shortness of breath. All other systems reviewed and are negative. Objective:     Visit Vitals  /65 (BP 1 Location: Left leg, BP Patient Position: At rest)   Pulse 63   Temp 97.2 °F (36.2 °C)   Resp 18   Ht 5' (1.524 m)   Wt 71.1 kg (156 lb 12 oz)   SpO2 100%   BMI 30.61 kg/m²    O2 Flow Rate (L/min): 55 l/min O2 Device: Hi flow nasal cannula,Heated    Temp (24hrs), Av.5 °F (36.4 °C), Min:97.2 °F (36.2 °C), Max:97.8 °F (36.6 °C)      No intake/output data recorded.    1901 -  0700  In: 3252.2 [I.V.:3252.2]  Out: 1600 [[id number]]    Recent Results (from the past 24 hour(s))   VANCOMYCIN, RANDOM    Collection Time: 21  7:53 PM   Result Value Ref Range    Vancomycin, random 10.5 ug/mL    Reported dose date Dose Dependent      Reported dose: Dose Dependent Units   EKG, 12 LEAD, SUBSEQUENT    Collection Time: 21 11:29 PM   Result Value Ref Range    Ventricular Rate 62 BPM    Atrial Rate 62 BPM    P-R Interval 128 ms    QRS Duration 86 ms    Q-T Interval 476 ms    QTC Calculation (Bezet) 483 ms    Calculated P Axis 17 degrees    Calculated R Axis 8 degrees    Calculated T Axis 112 degrees    Diagnosis       Normal sinus rhythm  Low voltage QRS  Nonspecific T wave abnormality  Prolonged QT  Abnormal ECG    Confirmed by Luisito Florez MD, Rosangela Higginbotham ([id number]) on 12/30/2021 7:20:00 AM     BLOOD GAS, ARTERIAL    Collection Time: 12/30/21  3:23 AM   Result Value Ref Range    pH 7.46 (H) 7.35 - 7.45      PCO2 29 (L) 35 - 45 mmHg    PO2 110 (H) 75 - 100 mmHg    O2 SAT 99 >95 %    BICARBONATE 23 22 - 26 mmol/L    BASE DEFICIT 2.2 (H) 0 - 2 mmol/L    O2 METHOD BiPAP      FIO2 75.0 %    EPAP/CPAP/PEEP 8      SITE Left Brachial      ESTEPHANIE'S TEST PASS     METABOLIC PANEL, BASIC    Collection Time: 12/30/21  4:36 AM   Result Value Ref Range    Sodium 144 136 - 145 mmol/L    Potassium 4.6 3.5 - 5.1 mmol/L    Chloride 116 (H) 97 - 108 mmol/L    CO2 22 21 - 32 mmol/L    Anion gap 6 5 - 15 mmol/L    Glucose 175 (H) 65 - 100 mg/dL    BUN 55 (H) 6 - 20 mg/dL    Creatinine 0.85 0.55 - 1.02 mg/dL    BUN/Creatinine ratio 65 (H) 12 - 20      GFR est AA >60 >60 ml/min/1.73m2    GFR est non-AA >60 >60 ml/min/1.73m2    Calcium 7.7 (L) 8.5 - 10.1 mg/dL   CBC WITH AUTOMATED DIFF    Collection Time: 12/30/21  4:36 AM   Result Value Ref Range    WBC 18.9 (H) 3.6 - 11.0 K/uL    RBC 3.32 (L) 3.80 - 5.20 M/uL    HGB 11.0 (L) 11.5 - 16.0 g/dL    HCT 33.0 (L) 35.0 - 47.0 %    MCV 99.4 (H) 80.0 - 99.0 FL    MCH 33.1 26.0 - 34.0 PG    MCHC 33.3 30.0 - 36.5 g/dL    RDW 14.5 11.5 - 14.5 %    PLATELET 16 (LL) 877 - 400 K/uL    NRBC 0.0 0.0  WBC    ABSOLUTE NRBC 0.00 0.00 - 0.01 K/uL    NEUTROPHILS 95 (H) 32 - 75 %    LYMPHOCYTES 1 (L) 12 - 49 %    MONOCYTES 2 (L) 5 - 13 %    EOSINOPHILS 0 0 - 7 %    BASOPHILS 0 0 - 1 %    IMMATURE GRANULOCYTES 2 (H) 0 - 0.5 %    ABS. NEUTROPHILS 17.9 (H) 1.8 - 8.0 K/UL    ABS. LYMPHOCYTES 0.2 (L) 0.8 - 3.5 K/UL    ABS. MONOCYTES 0.3 0.0 - 1.0 K/UL    ABS. EOSINOPHILS 0.0 0.0 - 0.4 K/UL    ABS. BASOPHILS 0.0 0.0 - 0.1 K/UL    ABS. IMM.  GRANS. 0.4 (H) 0.00 - 0.04 K/UL    DF AUTOMATED     CBC W/O DIFF    Collection Time: 12/30/21  6:01 AM   Result Value Ref Range    WBC 16.7 (H) 3.6 - 11.0 K/uL    RBC 3.15 (L) 3.80 - 5.20 M/uL    HGB 10.3 (L) 11.5 - 16.0 g/dL    HCT 31.0 (L) 35.0 - 47.0 %    MCV 98.4 80.0 - 99.0 FL    MCH 32.7 26.0 - 34.0 PG    MCHC 33.2 30.0 - 36.5 g/dL    RDW 14.4 11.5 - 14.5 %    PLATELET 16 (LL) 109 - 400 K/uL    NRBC 0.0 0.0  WBC    ABSOLUTE NRBC 0.00 0.00 - 0.01 K/uL        XR CHEST PORT   Final Result      XR CHEST PORT   Final Result      XR CHEST PORT   Final Result   No significant interval change. XR CHEST PORT   Final Result   No significant change. Bilateral interstitial markings, consistent   with edema, pneumonitis, or possibly fibrosis. Mildly elevated right   hemidiaphragm. Stable appearance of cardiomediastinal silhouette, status post   [place]. Arthritic changes of bilateral shoulders. Cholecystectomy clips. Apical   pleural thickening. No pneumothorax. XR CHEST PORT   Final Result   1. Moderate diffuse bilateral airspace opacities, increased from the previous   exam.   2. Trace bilateral pleural effusions. XR CHEST PORT   Final Result   1. There has been are resolution of the airspace disease previously   demonstrated within the lungs. There is a milder degree residual groundglass   opacity and interstitial disease. The differential diagnosis would include   partially resolved interstitial and alveolar edema versus partially resolved   pneumonitis/aspiration pneumonia. I favor edema given the rapid interval   decrease in the airspace process. 2.  The patient is status post a previous transfemoral aortic valve replacement. XR CHEST PORT   Final Result   Worsening bilateral airspace disease compatible with multi lobar pneumonia   and/or edema in the appropriate clinical setting. XR CHEST PORT   Final Result   Subtle bilateral pulmonary parenchymal opacities. Please see above.            PHYSICAL EXAM:    Physical Exam  Vitals and nursing note reviewed. Constitutional:       Comments: Lethargic, weak   HENT:      Head: Normocephalic and atraumatic. Ears:      Comments: [employer] hearing aides present bilaterally  Cardiovascular:      Rate and Rhythm: Normal rate and regular rhythm. Pulmonary:      Effort: Respiratory distress present. Breath sounds: No wheezing. Comments: High flow 60L 100%  Musculoskeletal:      Right lower leg: No edema. Left lower leg: No edema. Skin:     General: Skin is warm. Capillary Refill: Capillary refill takes less than 2 seconds. Neurological:      Mental Status: She is disoriented.    Psychiatric:      Comments: Lethargic, weak        Data Review    Recent Results (from the past 24 hour(s))   VANCOMYCIN, RANDOM    Collection Time: 12/29/21  7:53 PM   Result Value Ref Range    Vancomycin, random 10.5 ug/mL    Reported dose date Dose Dependent      Reported dose: Dose Dependent Units   EKG, 12 LEAD, SUBSEQUENT    Collection Time: 12/29/21 11:29 PM   Result Value Ref Range    Ventricular Rate 62 BPM    Atrial Rate 62 BPM    P-R Interval 128 ms    QRS Duration 86 ms    Q-T Interval 476 ms    QTC Calculation (Bezet) 483 ms    Calculated P Axis 17 degrees    Calculated R Axis 8 degrees    Calculated T Axis 112 degrees    Diagnosis       Normal sinus rhythm  Low voltage QRS  Nonspecific T wave abnormality  Prolonged QT  Abnormal ECG    Confirmed by [facility] MD, Elfego Pineda ([id number]) on 12/30/2021 7:20:00 AM     BLOOD GAS, ARTERIAL    Collection Time: 12/30/21  3:23 AM   Result Value Ref Range    pH 7.46 (H) 7.35 - 7.45      PCO2 29 (L) 35 - 45 mmHg    PO2 110 (H) 75 - 100 mmHg    O2 SAT 99 >95 %    BICARBONATE 23 22 - 26 mmol/L    BASE DEFICIT 2.2 (H) 0 - 2 mmol/L    O2 METHOD BiPAP      FIO2 75.0 %    EPAP/CPAP/PEEP 8      SITE Left Brachial      ESTEPHANIE'S TEST PASS     METABOLIC PANEL, BASIC    Collection Time: 12/30/21  4:36 AM   Result Value Ref Range    Sodium 144 136 - 145 mmol/L    Potassium 4.6 3.5 - 5.1 mmol/L Chloride 116 (H) 97 - 108 mmol/L    CO2 22 21 - 32 mmol/L    Anion gap 6 5 - 15 mmol/L    Glucose 175 (H) 65 - 100 mg/dL    BUN 55 (H) 6 - 20 mg/dL    Creatinine 0.85 0.55 - 1.02 mg/dL    BUN/Creatinine ratio 65 (H) 12 - 20      GFR est AA >60 >60 ml/min/1.73m2    GFR est non-AA >60 >60 ml/min/1.73m2    Calcium 7.7 (L) 8.5 - 10.1 mg/dL   CBC WITH AUTOMATED DIFF    Collection Time: 12/30/21  4:36 AM   Result Value Ref Range    WBC 18.9 (H) 3.6 - 11.0 K/uL    RBC 3.32 (L) 3.80 - 5.20 M/uL    HGB 11.0 (L) 11.5 - 16.0 g/dL    HCT 33.0 (L) 35.0 - 47.0 %    MCV 99.4 (H) 80.0 - 99.0 FL    MCH 33.1 26.0 - 34.0 PG    MCHC 33.3 30.0 - 36.5 g/dL    RDW 14.5 11.5 - 14.5 %    PLATELET 16 (LL) 242 - 400 K/uL    NRBC 0.0 0.0  WBC    ABSOLUTE NRBC 0.00 0.00 - 0.01 K/uL    NEUTROPHILS 95 (H) 32 - 75 %    LYMPHOCYTES 1 (L) 12 - 49 %    MONOCYTES 2 (L) 5 - 13 %    EOSINOPHILS 0 0 - 7 %    BASOPHILS 0 0 - 1 %    IMMATURE GRANULOCYTES 2 (H) 0 - 0.5 %    ABS. NEUTROPHILS 17.9 (H) 1.8 - 8.0 K/UL    ABS. LYMPHOCYTES 0.2 (L) 0.8 - 3.5 K/UL    ABS. MONOCYTES 0.3 0.0 - 1.0 K/UL    ABS. EOSINOPHILS 0.0 0.0 - 0.4 K/UL    ABS. BASOPHILS 0.0 0.0 - 0.1 K/UL    ABS. IMM.  GRANS. 0.4 (H) 0.00 - 0.04 K/UL    DF AUTOMATED     CBC W/O DIFF    Collection Time: 12/30/21  6:01 AM   Result Value Ref Range    WBC 16.7 (H) 3.6 - 11.0 K/uL    RBC 3.15 (L) 3.80 - 5.20 M/uL    HGB 10.3 (L) 11.5 - 16.0 g/dL    HCT 31.0 (L) 35.0 - 47.0 %    MCV 98.4 80.0 - 99.0 FL    MCH 32.7 26.0 - 34.0 PG    MCHC 33.2 30.0 - 36.5 g/dL    RDW 14.4 11.5 - 14.5 %    PLATELET 16 (LL) 037 - 400 K/uL    NRBC 0.0 0.0  WBC    ABSOLUTE NRBC 0.00 0.00 - 0.01 K/uL        Assessment/Plan:     Principal Problem:    Pneumonia due to COVID-19 virus (12/17/2021)    Active Problems:    Essential hypertension (8/22/2017)      Acquired hypothyroidism (8/22/2017)      Hypoxia (12/17/2021)      Dementia without behavioral disturbance ([facility] [address] (12/20/2021)      Acute respiratory failure with hypoxia [facility]) (12/20/2021)        Hospital Course:    Rosalind Ramos is a 79-year-old female with a history of dementia, hypertension, hypothyroidism, and hyperlipidemia from [address] that presented to the emergency room on 12/17/2021 for a 4-day history of cough. She was exposed to Covid. Rapid covid positive. Chest x-ray 12/17 shows signs concerning for pneumonia. Patient admitted for further Covid 19 work-up. Significant blood work for hypokalemia and creatinine of 1.53. Patient was placed on IV Decadron and azithromycin. Patient found to have elevated pro-Jose and LD. Pulmonology consulted. Elevated CRP. D-dimer normal. ABG 7.44/29/61 on 12/19 on nasal canula. Chest x-ray from 12/20 showing worsening bilateral airspace disease compatible with multilobar pneumonia and/or edema. Remdesivir started on 12/20/2021.12/17 blood: staphylococcus, coagulase negative will start on vancomycin. CXR 12/22 demonstrates resolution of the airspace disease previouslydemonstrated within the lungs. There is a milder degree residual ground glass opacity and interstitial disease. Continue IV vancomycin and zosyn. ABg 7.45/33/24. Repeat d-dimer is . 33. Speech recommending diet downgrade to minced and moist 5 with mildly thick liquids with 1:1 assistance with ALL PO intake. Repeat cxr shows bilateral diffuse airspace opacities. 12/25 jesus flow increased to 60 L 100%. Continue TPN and lipids. Continue vancomycin, Zosyn, vitamin C, vitamin D, zinc, Decadron 6 mg every 8 hours. 12/26 repeat cxr shows persistent pneumonia. Acute hypoxic respiratory failure secondary to Covid pneumonia   Superimposed aspiration PNA?   Infection markers trending downward  On vancomycin, zosyn, vitamin C, vitamin D, and zinc  S/p treatment with remdesivir  IV Decadron from 6mg q8hrs  Remains on high flow 60L 100%  Tessalon, cough syrup, and lozenges as needed  12/17 blood: staphylococcus, coagulase negative, final  One-to-one sitter for safety  Speech therapy following  Pulmonology following    Hypertension  BP at goal  Continue norvasc 5mg  Hydralazine 20mg IV q6hrs PRN    Hypothyroidism  Continue with Synthroid 75mcg    Dementia:  Continue Remeron 15mg qhs  Continue 1:1 sitter for safety  UA unremarkable    Poor oral intake:  Secondary to covid pneumonia  Continue tpn  RD following    DVT Prophylaxis: lovenox  GI Prophylaxis: protonix  Discharge and disposition barriers:   >72 hr  Continue  IV decadron, antibiotics  Continue tpn  Continue high flow, wean as tolerated    Gian Álvarez, daughter [phone]     Treatment plan discussed with Interdisciplinary team in detail. Overall prognosis remains guarded. Daughter Gian Álvarez updated on overall condition. She continues to requests to see her mother. I informed her the patient still remains symptomatic with high flow oxygen that increases the risk for Aerosil contamination. She reports the CDC guidelines. I've informed her I am aware of the guidelines which are recommendations; however, for the patient safety and theirs she can not have visitors. She reports that her and the siblings recently had covid within the last 3 months and are unlikely to contract it again in such short time. I explained to her that is even more reason for us to maintain strict no visitation policy until the patient is making a recovery. She feels that she would be able to get the patient to eat if she was present. Plan for meeting to discuss end of life care and quality of life. Care Plan discussed with: Interdisciplinary team    Total time spent with patient: >45 minutes.

## 2021-12-30 NOTE — PROGRESS NOTES
Comprehensive Nutrition Assessment    Type and Reason for Visit: Reassess (interim)    Nutrition Recommendations/Plan:   Initiate TF via NG of Osmolite 1.2 at 20mL/hr       Increase by 20mL q4hr to goal rate 55mL/hr, continuous  Flush with 100mL free water q4hr  Goal feeds provide 1584kcal (100%), 74g (100%), 1558mL fluid (100%)    Document TF rate, protein modular provision, water flushes, and GRVs in EMR    Nutrition Assessment:  Admit from [facility], found COVID-19+ with worsening CXR. On 60L HFNC. Intakes remain poor despite diet texture downgrade and ONS. Pt refusing all PO, spitting out food. PPN initiated 12/25, ran at 83mL/hr from 12/26-29; meeting ~50%kcal, >100% protein. Pt now transferred to ICU for worsening hypoxia. NG placed and plans to initiate TF. RD to order TF. Labs: BUN 55, , , Mg 2.9. Meds: precedex, norepi, Vit C, Vit D3, dexamethasone, Remeron, PPI, Zosyn, Vancomycin, ZnSu. Malnutrition Assessment:  Malnutrition Status:  Mild malnutrition    Context:  Acute illness      Estimated Daily Nutrient Needs:  Energy (kcal): 1534 sabrina (25kcal/kg); Weight Used for Energy Requirements: Current  Protein (g): 74 (1.2kg); Weight Used for Protein Requirements: Current  Fluid (ml/day): 1500ml; Method Used for Fluid Requirements: 1 ml/kcal    Nutrition Related Findings:  NFPE not performed d/t COVID. SLP following for mild-mod oropharyngeal dysphagia. No N/V/D/C per chart review, no edema. Last BM 12/24. Wounds:    Skin tears (Skin tear RLE posterior (not new). )       Current Nutrition Therapies:  ADULT ORAL NUTRITION SUPPLEMENT Breakfast, Lunch, Dinner; Standard High Calorie/High Protein  ADULT DIET Dysphagia - Minced & Moist; Mildly Thick (Nectar)  TPN ADULT-AA 4.25% D 5% PERIPHERAL    Anthropometric Measures:  · Height:  5' (152.4 cm)  · Current Body Wt:  65.7 kg (144 lb 13.5 oz) (12/28)   · Admission Body Wt:  134 lb 14.7 oz    · Ideal Body Wt:  100 lbs:  144.8 %   · BMI Category: Overweight (BMI 25.0-29. 9)       Nutrition Diagnosis:   · Inadequate protein-energy intake related to impaired respiratory function,cognitive or neurological impairment as evidenced by intake 0-25%    Nutrition Interventions:   Food and/or Nutrient Delivery: Continue NPO,Start tube feeding  Nutrition Education and Counseling: No recommendations at this time,Education not appropriate  Coordination of Nutrition Care: Continue to monitor while inpatient    Goals:  intakes >/=50% of EENs in >5 days, Wt maintenance within +/-0.5kg in >5 days       Nutrition Monitoring and Evaluation:   Behavioral-Environmental Outcomes: None identified  Food/Nutrient Intake Outcomes: Diet advancement/tolerance,Food and nutrient intake,Supplement intake,Parenteral nutrition intake/tolerance  Physical Signs/Symptoms Outcomes: Biochemical data,Weight,Meal time behavior    Discharge Planning:     Too soon to determine     Electronically signed by Ablaro Betts on 12/30/2021 at 11:00 AM    Contact:

## 2021-12-30 NOTE — PROGRESS NOTES
CM reviewed clinical chart. Patient's family is currently considering comfort care. Her original discharge disposition was to go to the [facility]. CM will continue to follow.

## 2021-12-30 NOTE — ANTIMICROBIAL STEWARDSHIP
The Antimicrobial Stewardship Team has reviewed current therapy. Patient is on Zosyn and day #9 of Vancomycin therapy for bloodstream infection. Blood grew Coag neg staph in 1 of 4 bottles; suspect possible contaminant. Consider d/c Vancomycin. Vancomycin is associated with the rapid development of resistance, along with C.difficile.

## 2021-12-30 NOTE — PROGRESS NOTES
Day # 10 of Vancomycin    Consult provided for this 80 y.o. female for indication of bacteremia. Antibiotic regimen:  Vancomycin + Zosyn  Concomitant nephrotoxic drugs: None  Frequency of BMP: Not scheduled    Recent Labs     21  0601 21  0436 21  0450 21  1715 21  1545   WBC 16.7* 18.9* 25.4*  --    < >   CREA  --  0.85 0.74 0.76  --    BUN  --  55* 51* 51*  --     < > = values in this interval not displayed. Est CrCl: ~35-40 ml/min; UO: 0.2 ml/kg/hr  Temp (24hrs), Av.5 °F (36.4 °C), Min:97.2 °F (36.2 °C), Max:98 °F (36.7 °C)    Cultures:    Blood: coagulase-negative Staphylococcus sp.  in 1/4 bottles    MRSA Swab: Not ordered, past first dose of vancomycin    Target range: AUC/BRENDAN 400-600    Recent level history:  Date/Time Dose & Interval Measured Level (mcg/mL) Associated AUC/BRENDAN    @ 0824 1250 mg x 1 1.8     @ 1545 1250 mg x 1, 750 mg q24h 10.3 334    -21 at 2000 1250mg  q24hrs  -21  at 2131         1250 mg q24h                         10.5                                522    Assessment/Plan:    continued leukocytosis, CRP improved  CrCl = 37.9 ml/min, Scr= 0.85  Extrapolated AUC is subtherapeutic, continue 1250 mg Vancomycin q24h for predicted AUC of 522  Will check a trough level prior to dose 22 @ 1800  Antimicrobial stop date TBD

## 2021-12-30 NOTE — PROGRESS NOTES
Spoke with nsg, pt now with NG tube and remains inappropriate for PO intake and SLP intervention due to overall status. Will d/c SLP consult and please reconsult if/as medically appropriate.

## 2021-12-30 NOTE — PROGRESS NOTES
1388 Received critical platelet level of 16 from .  I collected a redraw and sent to the lab at [address] [place] [address]

## 2021-12-30 NOTE — PROGRESS NOTES
Pulmonology and Critical Care Progress Note    Subjective:       Patient seen and examined  Overnight events noted    Patient was transferred over to the ICU 12/28 evening. Had an extensive family meeting yesterday. She is DNR. Over the night she was on BiPAP 12/8 with 75% O2. Blood gases and chest x-ray discussed below. Discussed with RT and she was placed on high flow oxygen. Discussed with RN. NG tube has been passed. She is to be started on tube feeds and given medications through the NG tube. Family members agreed with it. TPN will be discontinued. She is constantly reaching to the mask. She has mittens on her hands. Hemodynamically stable. Yesterday she was on and off BiPAP. Off BiPAP on high flow oxygen. Review of Systems:  Review of systems not obtained due to patient factors.     Current Facility-Administered Medications   Medication Dose Route Frequency Provider Last Rate Last Admin    pantoprazole (PROTONIX) granules for oral suspension 40 mg  40 mg Per NG tube Fatou Dumont MD        aspirin chewable tablet 81 mg  81 mg Per NG tube DAILY Nicolette Melvin MD        TPN ADULT-AA 4.25% D 5% PERIPHERAL   IntraVENous CONTINUOUS Nicolette Melvin MD 83 mL/hr at 12/29/21 2309 New Bag at 12/29/21 2309    dexmedeTOMidine (PRECEDEX) 400 mcg in 0.9% sodium chloride (MBP/ADV) 100 mL MBP  0.1-1.5 mcg/kg/hr IntraVENous TITRATE Jenni Muniz MD   Stopped at 12/30/21 0656    NOREPINephrine (LEVOPHED) 8 mg in 0.9% NS 250ml infusion  0.5-16 mcg/min IntraVENous TITRATE Karthik Luz MD   Stopped at 12/30/21 0217    vancomycin (VANCOCIN) 1,250 mg in 0.9% sodium chloride 250 mL (VIAL-MATE)  1,250 mg IntraVENous Q24H Nicolette Melvin  mL/hr at 12/29/21 2131 1,250 mg at 12/29/21 2131    piperacillin-tazobactam (ZOSYN) 3.375 g in 0.9% sodium chloride (MBP/ADV) 100 mL MBP  3.375 g IntraVENous Q8H Corona Abraham MD 25 mL/hr at 12/30/21 0105 3.375 g at 12/30/21 0105  dexamethasone (DECADRON) 4 mg/mL injection 6 mg  6 mg IntraVENous Q8H Angela Amezquita PA   6 mg at 12/30/21 0546    VANCOMYCIN INFORMATION NOTE   Other Rx Dosing/Monitoring Tram Prince MD        benzocaine-menthoL (CEPACOL) lozenge 1 Lozenge  1 Lozenge Mucous Membrane PRN Glenn Amezquita PA        cetirizine (ZYRTEC) tablet 5 mg  5 mg Oral DAILY PRN OSMIN Concepcion        guaiFENesin-codeine (ROBITUSSIN AC) 100-10 mg/5 mL solution 5 mL  5 mL Oral Q6H PRN Ana Laura Galindo PA-C   5 mL at 12/26/21 2247    amLODIPine (NORVASC) tablet 5 mg  5 mg Oral DAILY Ana Laura Galindo PA-C   5 mg at 12/27/21 0931    zinc oxide-white petrolatum 17-57 % topical paste   Topical TID Gaudencio Hou PA-C   Given at 12/29/21 2201    sodium chloride (NS) flush 5-40 mL  5-40 mL IntraVENous Q8H Rico Vaz MD   10 mL at 12/30/21 0547    sodium chloride (NS) flush 5-40 mL  5-40 mL IntraVENous PRN Rico Vaz MD        acetaminophen (TYLENOL) tablet 650 mg  650 mg Oral Q6H PRN Rico Vaz MD   650 mg at 12/23/21 2233    Or    acetaminophen (TYLENOL) suppository 650 mg  650 mg Rectal Q6H PRN Rico Vaz MD        ondansetron (ZOFRAN ODT) tablet 4 mg  4 mg Oral Q6H PRN Rico Vaz MD        Or    ondansetron (ZOFRAN) injection 4 mg  4 mg IntraVENous Q6H PRN Rico Vaz MD        enoxaparin (LOVENOX) injection 30 mg  30 mg SubCUTAneous Q24H Rico Vaz MD   30 mg at 12/30/21 0545    cholecalciferol (VITAMIN D3) (1000 Units /25 mcg) tablet 2,000 Units  2,000 Units Oral DAILY Rico Vaz MD   2,000 Units at 12/27/21 0931    clopidogreL (PLAVIX) tablet 75 mg  75 mg Oral DAILY Rico Vaz MD   75 mg at 12/27/21 0931    mirtazapine (REMERON) tablet 15 mg  15 mg Oral QHS Rico Vaz MD   15 mg at 12/26/21 2246    melatonin tablet 10 mg  10 mg Oral QHS Rico Vaz MD   10 mg at 12/27/21 7099  levothyroxine (SYNTHROID) tablet 75 mcg  75 mcg Oral ACB Rober Fraire MD   75 mcg at 21 0931    zinc sulfate (ZINCATE) 50 mg zinc (220 mg) capsule 1 Capsule  1 Capsule Oral DAILY Ana Laura Galindo PA-C   1 Capsule at 21 0931    ascorbic acid (vitamin C) (VITAMIN C) tablet 250 mg  250 mg Oral BID Reshma Galindo PA-C   250 mg at 216    hydrALAZINE (APRESOLINE) 20 mg/mL injection 20 mg  20 mg IntraVENous Q6H PRN Ana Laura Galindo PA-C                No Known Allergies        Objective:     Blood pressure 125/68, pulse 73, temperature 97.2 °F (36.2 °C), resp. rate 29, height 5' (1.524 m), weight 71.1 kg (156 lb 12 oz), SpO2 97 %. Temp (24hrs), Av.5 °F (36.4 °C), Min:97.2 °F (36.2 °C), Max:98 °F (36.7 °C)      Intake and Output:  Current Shift: No intake/output data recorded. Last 3 Shifts:  1901 -  0700  In: 3252.2 [I.V.:3252.2]  Out: 1600 [Urine:1600]    Physical Exam:    General: Lying in bed, no acute distress, on BiPAP. She has a sitter at the bedside. She is restless. Eye: Reactive, symmetric  Throat and Neck: Supple. NG tube in place. Lung: Reduced air entry bilaterally with scattered crackles, more pronounced over the bases. No significant change. Heart: S1+S2. No murmurs  Abdomen: soft, non-tender. Bowel sounds normal. No masses; obese  Extremities:  She has more edema of the right upper extremity. Also some edema of the ankles.    : Not done  Skin: No cyanosis  Neurologic: Grossly nonfocal    Lab/Data Review:  Recent Results (from the past 24 hour(s))   VANCOMYCIN, RANDOM    Collection Time: 21  7:53 PM   Result Value Ref Range    Vancomycin, random 10.5 ug/mL    Reported dose date Dose Dependent      Reported dose: Dose Dependent Units   EKG, 12 LEAD, SUBSEQUENT    Collection Time: 21 11:29 PM   Result Value Ref Range    Ventricular Rate 62 BPM    Atrial Rate 62 BPM    P-R Interval 128 ms    QRS Duration 86 ms    Q-T Interval 476 ms QTC Calculation (Bezet) 483 ms    Calculated P Axis 17 degrees    Calculated R Axis 8 degrees    Calculated T Axis 112 degrees    Diagnosis       Normal sinus rhythm  Low voltage QRS  Nonspecific T wave abnormality  Prolonged QT  Abnormal ECG    Confirmed by Donta Parra MD, Verona Moulton ([id number]) on 12/30/2021 7:20:00 AM     BLOOD GAS, ARTERIAL    Collection Time: 12/30/21  3:23 AM   Result Value Ref Range    pH 7.46 (H) 7.35 - 7.45      PCO2 29 (L) 35 - 45 mmHg    PO2 110 (H) 75 - 100 mmHg    O2 SAT 99 >95 %    BICARBONATE 23 22 - 26 mmol/L    BASE DEFICIT 2.2 (H) 0 - 2 mmol/L    O2 METHOD BiPAP      FIO2 75.0 %    EPAP/CPAP/PEEP 8      SITE Left Brachial      ESTEPHANIE'S TEST PASS     METABOLIC PANEL, BASIC    Collection Time: 12/30/21  4:36 AM   Result Value Ref Range    Sodium 144 136 - 145 mmol/L    Potassium 4.6 3.5 - 5.1 mmol/L    Chloride 116 (H) 97 - 108 mmol/L    CO2 22 21 - 32 mmol/L    Anion gap 6 5 - 15 mmol/L    Glucose 175 (H) 65 - 100 mg/dL    BUN 55 (H) 6 - 20 mg/dL    Creatinine 0.85 0.55 - 1.02 mg/dL    BUN/Creatinine ratio 65 (H) 12 - 20      GFR est AA >60 >60 ml/min/1.73m2    GFR est non-AA >60 >60 ml/min/1.73m2    Calcium 7.7 (L) 8.5 - 10.1 mg/dL   CBC WITH AUTOMATED DIFF    Collection Time: 12/30/21  4:36 AM   Result Value Ref Range    WBC 18.9 (H) 3.6 - 11.0 K/uL    RBC 3.32 (L) 3.80 - 5.20 M/uL    HGB 11.0 (L) 11.5 - 16.0 g/dL    HCT 33.0 (L) 35.0 - 47.0 %    MCV 99.4 (H) 80.0 - 99.0 FL    MCH 33.1 26.0 - 34.0 PG    MCHC 33.3 30.0 - 36.5 g/dL    RDW 14.5 11.5 - 14.5 %    PLATELET 16 (LL) 930 - 400 K/uL    NRBC 0.0 0.0  WBC    ABSOLUTE NRBC 0.00 0.00 - 0.01 K/uL    NEUTROPHILS 95 (H) 32 - 75 %    LYMPHOCYTES 1 (L) 12 - 49 %    MONOCYTES 2 (L) 5 - 13 %    EOSINOPHILS 0 0 - 7 %    BASOPHILS 0 0 - 1 %    IMMATURE GRANULOCYTES 2 (H) 0 - 0.5 %    ABS. NEUTROPHILS 17.9 (H) 1.8 - 8.0 K/UL    ABS. LYMPHOCYTES 0.2 (L) 0.8 - 3.5 K/UL    ABS. MONOCYTES 0.3 0.0 - 1.0 K/UL    ABS.  EOSINOPHILS 0.0 0.0 - 0.4 K/UL ABS. BASOPHILS 0.0 0.0 - 0.1 K/UL    ABS. IMM. GRANS. 0.4 (H) 0.00 - 0.04 K/UL    DF AUTOMATED     CBC W/O DIFF    Collection Time: 12/30/21  6:01 AM   Result Value Ref Range    WBC 16.7 (H) 3.6 - 11.0 K/uL    RBC 3.15 (L) 3.80 - 5.20 M/uL    HGB 10.3 (L) 11.5 - 16.0 g/dL    HCT 31.0 (L) 35.0 - 47.0 %    MCV 98.4 80.0 - 99.0 FL    MCH 32.7 26.0 - 34.0 PG    MCHC 33.2 30.0 - 36.5 g/dL    RDW 14.4 11.5 - 14.5 %    PLATELET 16 (LL) 784 - 400 K/uL    NRBC 0.0 0.0  WBC    ABSOLUTE NRBC 0.00 0.00 - 0.01 K/uL     chest X-ray      XR CHEST PORT   Final Result      XR CHEST PORT   Final Result      XR CHEST PORT   Final Result   No significant interval change. XR CHEST PORT   Final Result   No significant change. Bilateral interstitial markings, consistent   with edema, pneumonitis, or possibly fibrosis. Mildly elevated right   hemidiaphragm. Stable appearance of cardiomediastinal silhouette, status post   [place]. Arthritic changes of bilateral shoulders. Cholecystectomy clips. Apical   pleural thickening. No pneumothorax. XR CHEST PORT   Final Result   1. Moderate diffuse bilateral airspace opacities, increased from the previous   exam.   2. Trace bilateral pleural effusions. XR CHEST PORT   Final Result   1. There has been are resolution of the airspace disease previously   demonstrated within the lungs. There is a milder degree residual groundglass   opacity and interstitial disease. The differential diagnosis would include   partially resolved interstitial and alveolar edema versus partially resolved   pneumonitis/aspiration pneumonia. I favor edema given the rapid interval   decrease in the airspace process. 2.  The patient is status post a previous transfemoral aortic valve replacement. XR CHEST PORT   Final Result   Worsening bilateral airspace disease compatible with multi lobar pneumonia   and/or edema in the appropriate clinical setting.       XR CHEST PORT   Final Result   Subtle bilateral pulmonary parenchymal opacities. Please see above. CT Results  (Last 48 hours)    None          Assessment:     1. Acute respiratory failure with hypoxia  2. COVID-19 pneumonia  3. History of dementia  4. Hypertension  5. Hypothyroidism  6. Dyslipidemia    Plan:     Patient admitted to the hospital  Will be watched here closely    Gradual decline in clinical status with increasing weakness  Patient had been on high flow oxygen 60 L 100% FiO2  However 12/28 evening the patient had persistent desaturations. I was called. No distress. She was placed on a BiPAP 12/8 and 100% oxygen and transferred to the ICU. This morning she remains on BiPAP 12/8 with 75% O2. Blood gases done this morning show 7.4 6/29/110 on 75% BiPAP. Blood gases done yesterday morning showed 7.4 6/31/82 on 60% BiPAP. Chest x-ray done yesterday and today shows diffuse increased interstitial markings/infiltrates. No significant change. NG tube in place. She wakes up and tries to talk. Restless. Reaching for the mask. She has a sitter. I will try to give her a break of BiPAP on high flow oxygen. She does not appear to be safe for swallow. NG tube has been passed. Family members have agreed to it. We'll start tube feeds and give all medications through the NG tube. We'll stop TPN. Arterial blood gases done 12/28 morning showed 7.4 8/30/1975 on 100% high flow oxygen. We will repeat blood gas and chest x-ray in the morning    Patient is COVID-19 positive status  Continue dexamethasone  Remdesivir started 12/20/2021  Last D-dimer 12/23 was 0.3    Already on vancomycin, this may be discontinued. Zosyn for broader antibiotic coverage    Continue vitamin C and zinc    Continue blood pressure medications  Continue Synthroid  Continue Remeron    DVT and GI prophylaxis    Condition tenuous  Prognosis guarded    I discussed with her daughter Lenore Gutierrez, [phone] in detail on 12/29.   I then had a very long family meeting with multiple family members in the late afternoon. The patient is DNR. However they are leaning towards comfort care although have not made a final decision. Case discussed in detail with RN, RT, and care team   discussed with primary physician. Thank you for involving me in the care of this patient  I will follow with you closely during hospitalization    Time spent more than 50 minutes in direct patient care with no overlap reviewing results and records, decision-making, and answering questions.       Terrance Cooper MD  [facility]  12/30/2021

## 2021-12-31 NOTE — PROGRESS NOTES
Spiritual Care Assessment/Progress Note  [facility]      NAME: John De Jesus      MRN: [id number]  AGE: 80 y.o. SEX: female  Religion Affiliation: [facility]   Language: English     12/30/2021     Total Time (in minutes): 50     Spiritual Assessment begun in [place] [facility] CCU through conversation with:         []Patient        [] Family    [] Friend(s)        Reason for Consult: Death, Inpatient     Spiritual beliefs: (Please include comment if needed)     [x] Identifies with a silvestre tradition:  PRESBYTERIAN       [] Supported by a silvestre community:            [] Claims no spiritual orientation:           [] Seeking spiritual identity:                [] Adheres to an individual form of spirituality:           [] Not able to assess:                           Identified resources for coping:      [] Prayer                               [] Music                  [] Guided Imagery     [] Family/friends                 [] Pet visits     [] Devotional reading                         [x] Unknown     [] Other:                                            Interventions offered during this visit: (See comments for more details)    Patient Interventions: Prayer (actual)           Plan of Care:     [] Support spiritual and/or cultural needs    [] Support AMD and/or advance care planning process      [] Support grieving process   [] Coordinate Rites and/or Rituals    [] Coordination with community clergy   [] No spiritual needs identified at this time   [] Detailed Plan of Care below (See Comments)  [] Make referral to Music Therapy  [] Make referral to Pet Therapy     [] Make referral to Addiction services  [] Make referral to [facility]  [] Make referral to Spiritual Care Partner  [] No future visits requested        [x] Contact Spiritual Care for further referrals     Comments:  responded to referral from RN regarding patient's death.   consulted with RN re patient's transition to comfort care as per family request. There were no visitors present in patient's room. Family members were in waiting area outside ICU however indicated there were no needs for Spiritual Care support.  provided silent prayer of commendation outside patient's room, advised nurse to contact [facility] for any further referrals.     Visited by Marty De Guzman, [address], [facility]    can be contacted by calling  and requesting  on call

## 2021-12-31 NOTE — PROGRESS NOTES
Assumed care of patient from [facility]. MD called and was going to call the family in regards to her condition. MD placed comfort care orders in after discussion with family. Zi Howard called prior to implementing comfort care measures, she was aware RN was going to start once RN got off the phone. 2012 time of death verified with the supervisor Crissy Aguila RN, strip placed on patients chart   paged and will come to the bedside, family came in to see the patient. 2057 [employer] called left voicemail with patients information. The automated message informs that this patient is not eligible for organ donation. In house hospitalist, Dr. Naila Valencia notified of patients death. Patient belongings given to the daughter Zi Howard.

## 2021-12-31 NOTE — PROGRESS NOTES
Discussed with family- daughter and MPOA    Agreed for COMFORT CARE, DNR DNI and stop all treatment. Family is waiting downstairs and would like to say good byes before orders ADMINISTERED.

## 2022-01-03 NOTE — DISCHARGE SUMMARY
Physician Discharge Summary     Patient ID:    Gokul York  [id number]  37 y.o.  8/3/1930    Admit date: 12/17/2021    Discharge date : 1/3/2022    Chronic Diagnoses:    Problem List as of 12/30/2021 Date Reviewed: 12/18/2021          Codes Class Noted - Resolved    Dementia without behavioral disturbance ([facility] ICD-10-CM: F03.90  ICD-9-CM: 294.20  12/20/2021 - Present        Acute respiratory failure with hypoxia [facility]) ICD-10-CM: J96.01  ICD-9-CM: 518.81  12/20/2021 - Present        Hypoxia ICD-10-CM: R09.02  ICD-9-CM: 799.02  12/17/2021 - Present        * (Principal) Pneumonia due to COVID-19 virus ICD-10-CM: U07.1, J12.82  ICD-9-CM: 480.8, 079.89  12/17/2021 - Present        SOB (shortness of breath) ICD-10-CM: R06.02  ICD-9-CM: 786.05  3/24/2021 - Present        CAD (coronary artery disease), native coronary artery ICD-10-CM: I25.10  ICD-9-CM: 414.01  3/24/2021 - Present        Stroke ([facility] ICD-10-CM: I63.9  ICD-9-CM: 434.91  2/5/2021 - Present        TIA (transient ischemic attack) ICD-10-CM: G45.9  ICD-9-CM: 435.9  2/4/2021 - Present        Essential hypertension ICD-10-CM: I10  ICD-9-CM: 401.9  8/22/2017 - Present        Gastroesophageal reflux disease without esophagitis ICD-10-CM: K21.9  ICD-9-CM: 530.81  8/22/2017 - Present        Urinary incontinence ICD-10-CM: R32  ICD-9-CM: 788.30  8/22/2017 - Present        Acquired hypothyroidism ICD-10-CM: E03.9  ICD-9-CM: 244.9  8/22/2017 - Present        Allergic rhinitis ICD-10-CM: J30.9  ICD-9-CM: 477.9  8/22/2017 - Present        Chronic cough ICD-10-CM: R05.3  ICD-9-CM: 786.2  8/22/2017 - Present          22    Final Diagnoses:   Pneumonia due to COVID-19 virus [U07.1, J12.82]  Hypoxia [R09.02]    \      Hospital Course:        Jean-Claude Larsen is a 80-year-old female with a history of dementia, hypertension, hypothyroidism, and hyperlipidemia from [place] [address] that presented to the emergency room on 12/17/2021 for a 4-day history of cough. She was exposed to Covid. Rapid covid positive. Chest x-ray 12/17 shows signs concerning for pneumonia. Patient admitted for further Covid 19 work-up. Significant blood work for hypokalemia and creatinine of 1.53. Patient was placed on IV Decadron and azithromycin. Patient found to have elevated pro-Jose and LD. Pulmonology consulted. Elevated CRP. D-dimer normal. ABG 7.44/29/61 on 12/19 on nasal canula. Chest x-ray from 12/20 showing worsening bilateral airspace disease compatible with multilobar pneumonia and/or edema. Remdesivir started on 12/20/2021.12/17 blood: staphylococcus, coagulase negative will start on vancomycin. CXR 12/22 demonstrates resolution of the airspace disease previouslydemonstrated within the lungs. There is a milder degree residual ground glass opacity and interstitial disease. Continue IV vancomycin and zosyn. ABg 7.45/33/24. Repeat d-dimer is . 33. Speech recommending diet downgrade to minced and moist 5 with mildly thick liquids with 1:1 assistance with ALL PO intake. Repeat cxr shows bilateral diffuse airspace opacities. 12/25 jesus flow increased to 60 L 100%. Continue TPN and lipids. Continue vancomycin, Zosyn, vitamin C, vitamin D, zinc, Decadron 6 mg every 8 hours. 12/26 repeat cxr shows persistent pneumonia.        Acute hypoxic respiratory failure secondary to Covid pneumonia   Superimposed aspiration PNA?   Infection markers trending downward  On vancomycin, zosyn, vitamin C, vitamin D, and zinc  S/p treatment with remdesivir  IV Decadron from 6mg q8hrs  Remains on high flow 60L 100%  Tessalon, cough syrup, and lozenges as needed  12/17 blood: staphylococcus, coagulase negative, final  One-to-one sitter for safety  Speech therapy following  Pulmonology following     Hypertension  BP at goal  Continue norvasc 5mg  Hydralazine 20mg IV q6hrs PRN     Hypothyroidism  Continue with Synthroid 75mcg     Dementia:  Continue Remeron 15mg qhs  Continue 1:1 sitter for safety  UA unremarkable     Poor oral intake:  Secondary to covid pneumonia  Continue tpn  RD following     DVT Prophylaxis: lovenox  GI Prophylaxis: protonix  Discharge and disposition barriers:   >72 hr  Continue  IV decadron, antibiotics  Continue tpn  Continue high flow, wean as tolerated     Brandee Cobos, daughter [phone]      Discussed with family- daughter and MPOA     Agreed for COMFORT CARE, DNR DNI and stop all treatment.     Family is waiting downstairs and would like to say good byes before orders ADMINISTERED.      Assumed care of patient from [employer]. MD called and was going to call the family in regards to her condition. MD placed comfort care orders in after discussion with family. Brandee Cobos called prior to implementing comfort care measures, she was aware RN was going to start once RN got off the phone. 2012 time of death verified with the supervisor Gunner Reina RN, strip placed on patients chart   paged and will come to the bedside, family came in to see the patient. [employer] called left voicemail with patients information. The automated message informs that this patient is not eligible for organ donation. In house hospitalist, Dr. Kelley Cintron notified of patients death. Discharge Medications:   Discharge Medication List as of 12/31/2021 12:15 AM            Follow up Care:    1. Mario Nash MD in 1-2 weeks. Please call to set up an appointment shortly after discharge. Diet:  none    Disposition:  {death    Advanced Directive:   FULL    DNR x     Discharge Exam:  Physical Exam  Vitals and nursing note reviewed. Constitutional:       Comments: none  HENT:      Head: Normocephalic and atraumatic. Ears:      Comments: none  Cardiovascular:      Rate and Rhythm: none  Pulmonary:      Effort: none     Breath sounds: none     Comments: tessie  Musculoskeletal:      Right lower leg: No edema. Left lower leg: No edema. Skin:     General: Skin is warm. Capillary Refill: Capillary refill takes less than 2 seconds. Neurological:      Mental Status: none   Psychiatric:      Commentsnone    CONSULTATIONS: pulm      Significant Diagnostic Studies:     Radiologic Studies -   Results from Hospital Encounter encounter on 12/17/21    XR CHEST PORT    Narrative  Central venous catheter placement. Comparison chest x-ray 12/30/2021 at 1036 hours. Impression  FINDINGS: IMPRESSION: Single frontal view of the chest. Enteric tube extends  below left hemidiaphragm. Interval placement right central catheter distal tip right mediastinum. New  right pneumothorax measures 2 cm from the lung apex and 2 cm laterally. Right  lung partial collapse. There is suggestion of some mass effect on the right  mediastinum and tilting of the daniel. Called report to patient's nurse Johana  at 725pm 12/30/2021. Diffuse bilateral interstitial disease remains. Normal heart size. Cardiac valve replacement.      CT Results  (Last 48 hours)    None              Discharge time spent 35 minutes    Signed:  Yoseph Willis MD  1/3/2022  5:31 PM

## 2023-10-30 NOTE — PROGRESS NOTES
----- Message from Kelsy Benavides MD sent at 10/29/2023  5:14 PM CDT -----  Cholesterol is w/in acceptable range.  Total is elevated b/c HDL is nice and high.  Normal thyroid function.  WBC and platelets are elevated.  This may be due to a current illness.  Is she feeling sick? If she has a sore throat, I recommend a strep test as her neutrophil count is elevated (indicating apossible bacterial infection) If not, repeat cbc in 3 months.      Problem: Dysphagia (Adult)  Goal: *Acute Goals and Plan of Care (Insert Text)  Description: Speech Therapy Goals  Initiated 12/23/2021  -Patient will participate in modified barium swallow study within 7 day(s), if indicated pending pt's progress. [ ] Not met  [ ]  MET   [ ] Progressing  [ ] Lynnette Grim  -Patient will tolerate minced and moist diet with mildly/nectar thick liquids without signs/symptoms of aspiration given no cues within 7 day(s). [ ] Not met  [ ]  MET   [ ] Progressing  [ ] Lynnette Grim  -Patient will demonstrate understanding of swallow safety precautions and aspiration precautions, diet recs with no cues within 7 day(s). [ ] Not met  [ ]  MET   [ ] Progressing  [ ] Discontinue  Outcome: Not Met   SPEECH [address] EVALUATION  Patient: Joey Norman (2 y.o. female)  Date: 12/23/2021  Primary Diagnosis: Pneumonia due to COVID-19 virus [U07.1, J12.82]  Hypoxia [R09.02]        Precautions: aspiration, 1:1       ASSESSMENT :  Based on the objective data described below, the patient presents with mild-mod oropharyngeal dysphagia negatively impacted by AMS, respiratory status. Pt admitted with pna due to COVID-19, recent decline in respiratory status, now requiring HFNC. Pt is [place], hearing aids placed, suspect batteries need to be changed- discussed with nsg. Pt was assessed by SLP in this setting 2-5-21 with recs for regular/thin diet at that time. Pt sleeping but easily arousable, positioned upright in bed. Pt currently on HFNC 02. Dentures are present, vocal quality is weak. Pt does not consistently follow oral motor commands for thorough assessment, min right facial asymmetry is noted. Fatigue appreciated. Pt given trials of thin via straw, mildly thick via straw, puree, hard and soft solids. Oral phase with weak labial seal but pt is able to drink from a straw without difficulty. Purees tolerated with mild delay in a-p propulsion.  With solids, pt with difficulty coordinating mastication and respiration with oral spillage of crumbs due to pressure from HFNC. Increased effort required for solids, improves with mashable solids. Pt is able to clear oral cavity with delay. Pharyngeal phase appears largely [place] with min delay with subsequent trials of thins noted. DELAYED significant cough after completion of assessment is noted, dry cough throughout. Patient will benefit from skilled intervention to address the above impairments. Patients rehabilitation potential is considered to be Fair     PLAN :  Recommendations and Planned Interventions: At this time, recommend diet downgrade to minced and moist 5 with mildly thick liquids with 1:1 assistance with ALL PO intake, STRICT aspiration and GERD precautions, monitor pt closely for s/s aspiration, meds crushed if able in puree, FEED ONLY IF AWAKE AND ALERT. Will request MBS if/as indicated pending pt's progress to further assess sw safety and function. Frequency/Duration: Patient will be followed by speech-language pathology 3 times a week to address goals. Discharge Recommendations: cont SLP tx at this time. SUBJECTIVE:   Patient arousable, responsive, limited verbalizations. OBJECTIVE:     CXR Results  (Last 48 hours)                 12/22/21 1632  XR CHEST PORT Final result    Impression:  1. There has been are resolution of the airspace disease previously   demonstrated within the lungs. There is a milder degree residual groundglass   opacity and interstitial disease. The differential diagnosis would include   partially resolved interstitial and alveolar edema versus partially resolved   pneumonitis/aspiration pneumonia. I favor edema given the rapid interval   decrease in the airspace process. 2.  The patient is status post a previous transfemoral aortic valve replacement. Narrative:   This study is a portable radiograph of the chest dated 12/22/2021 obtained at   4:22 PM. HISTORY: Low O2 saturations. COMPARISON: 12/20/2021. FINDINGS: There is an upper normal size to the cardiac silhouette. The pulmonary   vessels are not significantly distended. There is been partial resolution of the previously demonstrated multifocal   airspace disease within the mid to lower lung zones. However there is still   residual groundglass opacity within the right mid to lower lung zone as well as   residual interstitial disease. The costophrenic angles are maintained without pleural effusions or   pneumothorax. There is moderate calcification within aortic knob. The remainder the   mediastinal reflections are within normal limits. The patient is status post a   previous TAVR. There is osteopenia porosis of osseous structures. 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                   Past Medical History:   Diagnosis Date    Dementia ([address]     Hard of hearing     Hypercholesterolemia     Hypertension     Ill-defined condition     memory loss, possible early dementia per daughter    Stroke [facility])     Thyroid disease      Past Surgical History:   Procedure Laterality Date    HX CHOLECYSTECTOMY      HX HYSTERECTOMY      HX KNEE REPLACEMENT Bilateral     [place] CARDIAC SURG PROCEDURE UNLIST      stents placed to carotid artery     Prior Level of Function/Home Situation:   Home Situation  [facility] [address] [address] [facility] [address]: [address]  One/Two Story Residence: One story  Living Alone: No  Support Systems: Child(eden)  Patient Expects to be Discharged to[de-identified] Assisted living  Current DME Used/Available at Home: Valentino Sings, rollator  Diet prior to admission: regular/thin  Current Diet:  easy to chew/thin   Cognitive and Communication Status:  Neurologic State: Alert  Orientation Level: Disoriented to place,Disoriented to situation,Disoriented to time,Oriented to person  Cognition: Unable to assess (comment)      Pain:  0    After treatment:   Patient left in no apparent distress in bed, Call bell within reach, Nursing notified, and 1:1 sitter present    COMMUNICATION/EDUCATION:   Patient was educated regarding purpose of SLP assessment, POC, diet recs and sw safety precautions. Patient demonstrated Guarded understanding as evidenced by AMS, hearing loss. The patient's plan of care including recommendations, planned interventions, and recommended diet changes were discussed with: Registered nurse and PA . Patient/family have participated as able in goal setting and plan of care. Patient/family agree to work toward stated goals and plan of care.     Thank you for this referral.  Danny Case M.S. CCC-SLP  Time Calculation: 19 mins

## 2025-01-21 NOTE — ED NOTES
Bedside and Verbal shift change report given to [facility] (oncoming nurse) by April (offgoing nurse). Report included the following information SBAR, ED Summary, Intake/Output, MAR and Recent Results.
Pt received lunch tray. Pt sat in upright position. Pt states no further needs at this time.
no

## (undated) DEVICE — BOWL UTIL 16OZ STRL --

## (undated) DEVICE — SYRINGE MED 10ML PUR GAM COMPATIBLE POLYCARB FIX M LUER CONN

## (undated) DEVICE — DEVICE INFL 20ML 30ATM DGT FLD DISPNS SYR W ACCESSPLUS BLU

## (undated) DEVICE — GLIDESHEATH SLENDER ACCESS KIT: Brand: GLIDESHEATH SLENDER

## (undated) DEVICE — SYRINGE MED 10ML RED POLYCARB BRL FIX M LUER CONN FLAT GRP

## (undated) DEVICE — COPILOT BLEEDBACK CONTROL VALVE: Brand: COPILOT

## (undated) DEVICE — RUNTHROUGH NS EXTRA FLOPPY PTCA GUIDEWIRE: Brand: RUNTHROUGH

## (undated) DEVICE — PTCA DILATATION CATHETER: Brand: EMERGE™

## (undated) DEVICE — GLIDESHEATH SLENDER NITINOL HYDROPHILIC COATED INTRODUCER SHEATH: Brand: GLIDESHEATH SLENDER

## (undated) DEVICE — CATHETER ANGIO JR4 PIG STD AD 4 FRX110 CM MP QUIK CARE INFIN

## (undated) DEVICE — LULU RADIAL/FEMORAL ANGIOGRAPHY SHEET: Brand: CONVERTORS

## (undated) DEVICE — WASTEBAG DRIP/ADAPTER: Brand: MEDLINE INDUSTRIES, INC.

## (undated) DEVICE — PRESSURE TUBING: Brand: TRUWAVE

## (undated) DEVICE — GUIDEWIRE VASC L260CM DIA0.035IN TIP L3MM STD EXCHG PTFE J

## (undated) DEVICE — GUIDE CATH CONVEY 5FR JL3.5 -- 39228-661

## (undated) DEVICE — BAND COMPR L24CM REG CLR PLAS HEMSTAT EXT HK AND LOOP RETEN

## (undated) DEVICE — PERCUTANEOUS ENTRY THINWALL NEEDLE  ONE-PART: Brand: COOK

## (undated) DEVICE — COPE MANDRIL WIRE GUIDE STAINLESS STEEL: Brand: COPE

## (undated) DEVICE — TOOL INSRT ANGI GDWIRE MTL SS --

## (undated) DEVICE — Device

## (undated) DEVICE — TUBING PRESS INJ FLX SH 30IN --

## (undated) DEVICE — GUIDEWIRE VASC L185CM DIA0.014IN HYDRPHLC PTFE STR TIP

## (undated) DEVICE — SURGICAL PROCEDURE TRAY CRD CATH 3 PRT

## (undated) DEVICE — DEVICE TORQ FLRESCNT PNK FOR HEMSTAS VLV